# Patient Record
Sex: FEMALE | Race: WHITE | NOT HISPANIC OR LATINO | Employment: FULL TIME | URBAN - METROPOLITAN AREA
[De-identification: names, ages, dates, MRNs, and addresses within clinical notes are randomized per-mention and may not be internally consistent; named-entity substitution may affect disease eponyms.]

---
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
## 2018-01-24 ENCOUNTER — TELEPHONE (OUTPATIENT)
Dept: ORTHOPEDICS | Facility: CLINIC | Age: 18
End: 2018-01-24

## 2018-01-24 NOTE — TELEPHONE ENCOUNTER
----- Message from Nader Urbina MD sent at 1/24/2018  2:44 PM CST -----  Mom had called a few days ago asking to be referred to Dr. Vazquez.  Can you let her know that I emailed him and his office will contact her to set up an appt?  Thanks.

## 2018-02-22 ENCOUNTER — OFFICE VISIT (OUTPATIENT)
Dept: SPORTS MEDICINE | Facility: CLINIC | Age: 18
End: 2018-02-22
Payer: MEDICAID

## 2018-02-22 VITALS
DIASTOLIC BLOOD PRESSURE: 84 MMHG | BODY MASS INDEX: 36.86 KG/M2 | SYSTOLIC BLOOD PRESSURE: 123 MMHG | WEIGHT: 208 LBS | HEART RATE: 88 BPM | HEIGHT: 63 IN

## 2018-02-22 DIAGNOSIS — M17.12 PATELLOFEMORAL ARTHRITIS OF LEFT KNEE: Primary | ICD-10-CM

## 2018-02-22 DIAGNOSIS — M17.12 DEGENERATIVE ARTHRITIS OF LEFT KNEE: ICD-10-CM

## 2018-02-22 PROCEDURE — 99999 PR PBB SHADOW E&M-EST. PATIENT-LVL III: CPT | Mod: PBBFAC,,, | Performed by: ORTHOPAEDIC SURGERY

## 2018-02-22 PROCEDURE — 99203 OFFICE O/P NEW LOW 30 MIN: CPT | Mod: S$PBB,,, | Performed by: ORTHOPAEDIC SURGERY

## 2018-02-22 PROCEDURE — 99213 OFFICE O/P EST LOW 20 MIN: CPT | Mod: PBBFAC,PO | Performed by: ORTHOPAEDIC SURGERY

## 2018-02-22 PROCEDURE — 3008F BODY MASS INDEX DOCD: CPT | Mod: ,,, | Performed by: ORTHOPAEDIC SURGERY

## 2018-02-22 RX ORDER — DICLOFENAC SODIUM 10 MG/G
2 GEL TOPICAL 3 TIMES DAILY PRN
Qty: 1 TUBE | Refills: 1 | Status: SHIPPED | OUTPATIENT
Start: 2018-02-22 | End: 2018-02-27

## 2018-02-22 RX ORDER — DICLOFENAC SODIUM 10 MG/G
2 GEL TOPICAL 3 TIMES DAILY PRN
Qty: 1 TUBE | Refills: 1 | Status: SHIPPED | OUTPATIENT
Start: 2018-02-22 | End: 2018-02-22 | Stop reason: SDUPTHER

## 2018-02-22 NOTE — PROGRESS NOTES
CC: Left knee pain    17 y.o. Female with a history of left knee pain.  She has a complex surgical history:    8/2013 MPFL reconstruction  2/2015 Jasper osteotomy  8/2017 distal femur varus-producing osteotomy.  Knee scope. 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  This was treated with above surgeries.  She reports some instability but mostly anterior knee pain.  Pain is worse with activities and also with prolonged inactivity.  She reports crepitus and clicking.      She states that the pain is severe and not responding to any conservative care.      Is affecting ADLs.      Review of Systems   Constitution: Negative. Negative for chills, fever and night sweats.   HENT: Negative for congestion and headaches.    Eyes: Negative for blurred vision, left vision loss and right vision loss.   Cardiovascular: Negative for chest pain and syncope.   Respiratory: Negative for cough and shortness of breath.    Endocrine: Negative for polydipsia, polyphagia and polyuria.   Hematologic/Lymphatic: Negative for bleeding problem. Does not bruise/bleed easily.   Skin: Negative for dry skin, itching and rash.   Musculoskeletal: Negative for falls. Positive for knee pain  Gastrointestinal: Negative for abdominal pain and bowel incontinence.   Genitourinary: Negative for bladder incontinence and nocturia.   Neurological: Negative for disturbances in coordination, loss of balance and seizures.   Psychiatric/Behavioral: Negative for depression. The patient does not have insomnia.    Allergic/Immunologic: Negative for hives and persistent infections.     PAST MEDICAL HISTORY:   Past Medical History:   Diagnosis Date    Aggressive behavior     post anesthesia    Granuloma of skin     left lower eye lid at age 4    Ovarian cyst 2013    Recurrent dislocation of left patella     Vision abnormalities      PAST SURGICAL HISTORY:   Past Surgical History:   Procedure Laterality Date  "   EYE SURGERY      granuloma removal under left eye    KNEE ARTHROSCOPY      Osteotomy of tibia      PATELLA REALIGNMENT       FAMILY HISTORY:   Family History   Problem Relation Age of Onset    Diabetes Mother     Seizures Father      SOCIAL HISTORY:   Social History     Social History    Marital status: Single     Spouse name: N/A    Number of children: N/A    Years of education: N/A     Occupational History    Not on file.     Social History Main Topics    Smoking status: Never Smoker    Smokeless tobacco: Never Used    Alcohol use No    Drug use: No    Sexual activity: No     Other Topics Concern    Not on file     Social History Narrative    No narrative on file       MEDICATIONS:   Current Outpatient Prescriptions:     albuterol (PROVENTIL) 2.5 mg /3 mL (0.083 %) nebulizer solution, Take 3 mLs (2.5 mg total) by nebulization every 4 (four) hours as needed., Disp: 60 each, Rfl: 3    diazePAM (VALIUM) 2 MG tablet, TAKE 1 TABLET BY MOUTH EVERY 6 HOURS AS NEEDED FOR MUSCLE SPASMS, Disp: 30 tablet, Rfl: 0    escitalopram oxalate (LEXAPRO) 20 MG tablet, Take 1 tablet (20 mg total) by mouth once daily., Disp: 1 tablet, Rfl: 0    lorazepam (ATIVAN) 0.5 MG tablet, Take 1 tablet (0.5 mg total) by mouth 2 (two) times daily., Disp: 3 tablet, Rfl: 0    nebulizer accessories Kit, 3 neb kits per month for 12 months, Disp: 3 kit, Rfl: 12    traZODone (DESYREL) 100 MG tablet, Take 200 mg by mouth every evening., Disp: , Rfl:     ziprasidone (GEODON) 80 MG capsule, Take 80 mg by mouth 2 (two) times daily with meals., Disp: , Rfl:   ALLERGIES: Review of patient's allergies indicates:  No Known Allergies    VITAL SIGNS: /84   Pulse 88   Ht 5' 3" (1.6 m)   Wt 94.3 kg (208 lb)   BMI 36.85 kg/m²      PHYSICAL EXAMINATION  VITAL SIGNS: /84   Pulse 88   Ht 5' 3" (1.6 m)   Wt 94.3 kg (208 lb)   BMI 36.85 kg/m²    General:  The patient is alert and oriented x 3.  Mood is pleasant.  Observation " of ears, eyes and nose reveal no gross abnormalities.  HEENT: NCAT, sclera nonicteric  Lungs: Respirations are equal and unlabored.    Left KNEE EXAMINATION     OBSERVATION / INSPECTION   Gait:   Nonantalgic   Alignment:  Neutral   Scars:   Several - lateral thigh, midline anterior, arthroscopic portals, medial   Muscle atrophy: Mild  Effusion:  None   Warmth:  None   Discoloration:   none     TENDERNESS / CREPITUS (T / C):          T / C      T / C   Patella   + / +   Lateral joint line   - / -    Peripatellar medial  -  Medial joint line    + / -    Peripatellar lateral -  Medial plica   - / -    Patellar tendon -   Popliteal fossa  - / -    Quad tendon   -   Gastrocnemius   -   Prepatellar Bursa - / -   Quadricep   -   Tibial tubercle  -  Thigh/hamstring  -   Pes anserine/HS -  Fibula    -   ITB   - / -  Tibia     -   Tib/fib joint  - / -  LCL    -     MFC   - / -   MCL: Proximal  -    LFC   - / -    Distal   -          ROM: (* = pain)  PASSIVE   ACTIVE    Left :   5 / 0 / 145   5 / 0 / 145     Right :    5 / 0 / 145   5 / 0 / 145    Patellofemoral examination:  See above noted areas of tenderness.   Patella position    Subluxation / dislocation: Centered           Sup. / Inf;   Normal   Crepitus (PF):    Absent   Patellar Mobility:       Medial-lateral:   2 quadrants laterally   Superior-inferior:  Normal    Inferior tilt   Normal    Patellar tendon:  Normal   Lateral tilt:    Normal   J-sign:     None   Patellofemoral grind:   No pain       MENISCAL SIGNS:     Pain on terminal extension:  neg  Pain on terminal flexion:  neg  Joshuas maneuver:  neg for pain  Squat     neg posterior joint pain    LIGAMENT EXAMINATION:  ACL / Lachman:  normal (-1 to 2mm)    PCL-Post.  drawer: normal 0 to 2mm  MCL- Valgus:  normal 0 to 2mm  LCL- Varus:  normal 0 to 2mm  Pivot shift: normal (Equal)   Dial Test: difference c/w other side   At 30° flexion: normal (< 5°)    At 90° flexion: normal (< 5°)   Reverse Pivot Shift:    normal (Equal)     STRENGTH: (* = with pain) PAINFUL SIDE   Quadricep   5/5   Hamstrin/5    EXTREMITY NEURO-VASCULAR EXAMINATION:   Sensation:  Grossly intact to light touch all dermatomal regions.   Motor Function:  Fully intact motor function at hip, knee, foot and ankle    DTRs;  quadriceps and  achilles 2+.  No clonus and downgoing Babinski.    Vascular status:  DP and PT pulses 2+, brisk capillary refill, symmetric.     Other Findings:       X-rays:  Hip to ankle show left distal femoral plate.  Mechanical alignment is restored.    Tibial tubercle screws in good position  No significaint arthritic changes     ASSESSMENT:      17 year old female with complex left knee surgical history.  Patellofemoral arthritis and pain    PLAN:     1.  PT in [place]  3.  Follow-up in 3 months.  Will consider Euflexxa next visit    All questions were answered, pt will contact us for questions or concerns in the interim.

## 2018-02-22 NOTE — LETTER
February 22, 2018      Nader Urbina MD  [address]  [facility] [address]           [facility]  [address]  [facility] [address]  Phone: [phone]          Patient: Sheila Urbina   MR Number: [id number]   YOB: 2000   Date of Visit: 2/22/2018       Dear Dr. Nader Urbina:    Thank you for referring Sheila Urbina to me for evaluation. Attached you will find relevant portions of my assessment and plan of care.    If you have questions, please do not hesitate to call me. I look forward to following Sheila Urbina along with you.    Sincerely,    Ji Vazquez MD    Enclosure  CC:  No Recipients    If you would like to receive this communication electronically, please contact [email or web address] or [phone] to request more information on [employer] access.    For providers and/or their staff who would like to refer a patient to [facility], please contact us through our one-stop-shop provider referral line, [facility], at [phone].    If you feel you have received this communication in error or would no longer like to receive these types of communications, please e-mail [email or web address]

## 2018-02-27 DIAGNOSIS — M25.562 LEFT KNEE PAIN: Primary | ICD-10-CM

## 2018-02-27 RX ORDER — DICLOFENAC SODIUM 75 MG/1
75 TABLET, DELAYED RELEASE ORAL 2 TIMES DAILY
Qty: 60 TABLET | Refills: 2 | Status: SHIPPED | OUTPATIENT
Start: 2018-02-27 | End: 2019-07-19 | Stop reason: ALTCHOICE

## 2018-02-27 NOTE — TELEPHONE ENCOUNTER
----- Message from Nia Salcido sent at 2/26/2018  4:38 PM CST -----  Contact: mother       ----- Message -----  From: Nora Cabrales  Sent: 2/26/2018   2:27 PM  To: George DUNCAN Staff    Pt's mother called stating the pt's insurance will not cover the rx for diclofenac sodium (VOLTAREN) 1 % Gel and they are requesting a new medication to be prescribed to the pt. [phone]

## 2018-02-27 NOTE — TELEPHONE ENCOUNTER
Returning call to patient' mother, Edelmira.  Dr. Vazquez will send Voltaren tablets 75mg BID to her pharmacy to take instead.

## 2018-04-06 ENCOUNTER — OFFICE VISIT (OUTPATIENT)
Dept: SPORTS MEDICINE | Facility: CLINIC | Age: 18
End: 2018-04-06
Payer: MEDICAID

## 2018-04-06 ENCOUNTER — HOSPITAL ENCOUNTER (OUTPATIENT)
Dept: RADIOLOGY | Facility: HOSPITAL | Age: 18
Discharge: HOME | End: 2018-04-06
Attending: PHYSICIAN ASSISTANT
Payer: MEDICAID

## 2018-04-06 VITALS
SYSTOLIC BLOOD PRESSURE: 145 MMHG | HEIGHT: 63 IN | BODY MASS INDEX: 36.86 KG/M2 | WEIGHT: 208 LBS | HEART RATE: 89 BPM | DIASTOLIC BLOOD PRESSURE: 82 MMHG

## 2018-04-06 DIAGNOSIS — M25.562 LEFT KNEE PAIN, UNSPECIFIED CHRONICITY: Primary | ICD-10-CM

## 2018-04-06 DIAGNOSIS — M25.562 LEFT KNEE PAIN, UNSPECIFIED CHRONICITY: ICD-10-CM

## 2018-04-06 PROCEDURE — 99213 OFFICE O/P EST LOW 20 MIN: CPT | Mod: PBBFAC,25,PO | Performed by: PHYSICIAN ASSISTANT

## 2018-04-06 PROCEDURE — 99999 PR PBB SHADOW E&M-EST. PATIENT-LVL III: CPT | Mod: PBBFAC,,, | Performed by: PHYSICIAN ASSISTANT

## 2018-04-06 PROCEDURE — 73564 X-RAY EXAM KNEE 4 OR MORE: CPT | Mod: TC,50,FY,PO

## 2018-04-06 PROCEDURE — 73564 X-RAY EXAM KNEE 4 OR MORE: CPT | Mod: 26,50,, | Performed by: RADIOLOGY

## 2018-04-06 PROCEDURE — 99214 OFFICE O/P EST MOD 30 MIN: CPT | Mod: S$PBB,,, | Performed by: PHYSICIAN ASSISTANT

## 2018-04-06 NOTE — PROGRESS NOTES
CC: Left knee pain    17 y.o. Female with a history of left knee pain.  On 4/1/18 patient was doing a handstand in the pool when she fell to the side and your medial left knee hit the edge of the pool. Pain was not significant at first but got worse overnight. She took diclofenac with mild relief.  Pain has gotten progressively worse. She has mild swelling over medial surgical scar. She is concerned about the screw in that area from prior surgery.    She has a complex surgical history:    8/2013 MPFL reconstruction  2/2015 Jasper osteotomy  8/2017 distal femur varus-producing osteotomy.  Knee scope. 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  This was treated with above surgeries.  She reports some instability but mostly anterior knee pain.  Pain is worse with activities and also with prolonged inactivity.  She reports crepitus and clicking.      She states that the pain is severe and not responding to any conservative care.      Is affecting ADLs.      Review of Systems   Constitution: Negative. Negative for chills, fever and night sweats.   HENT: Negative for congestion and headaches.    Eyes: Negative for blurred vision, left vision loss and right vision loss.   Cardiovascular: Negative for chest pain and syncope.   Respiratory: Negative for cough and shortness of breath.    Endocrine: Negative for polydipsia, polyphagia and polyuria.   Hematologic/Lymphatic: Negative for bleeding problem. Does not bruise/bleed easily.   Skin: Negative for dry skin, itching and rash.   Musculoskeletal: Negative for falls. Positive for knee pain  Gastrointestinal: Negative for abdominal pain and bowel incontinence.   Genitourinary: Negative for bladder incontinence and nocturia.   Neurological: Negative for disturbances in coordination, loss of balance and seizures.   Psychiatric/Behavioral: Negative for depression. The patient does not have insomnia.     Allergic/Immunologic: Negative for hives and persistent infections.     PAST MEDICAL HISTORY:   Past Medical History:   Diagnosis Date    Aggressive behavior     post anesthesia    Granuloma of skin     left lower eye lid at age 4    Ovarian cyst 2013    Recurrent dislocation of left patella     Vision abnormalities      PAST SURGICAL HISTORY:   Past Surgical History:   Procedure Laterality Date    EYE SURGERY      granuloma removal under left eye    KNEE ARTHROSCOPY      Osteotomy of tibia      PATELLA REALIGNMENT       FAMILY HISTORY:   Family History   Problem Relation Age of Onset    Diabetes Mother     Seizures Father      SOCIAL HISTORY:   Social History     Social History    Marital status: Single     Spouse name: N/A    Number of children: N/A    Years of education: N/A     Occupational History    Not on file.     Social History Main Topics    Smoking status: Never Smoker    Smokeless tobacco: Never Used    Alcohol use No    Drug use: No    Sexual activity: No     Other Topics Concern    Not on file     Social History Narrative    No narrative on file       MEDICATIONS:   Current Outpatient Prescriptions:     albuterol (PROVENTIL) 2.5 mg /3 mL (0.083 %) nebulizer solution, Take 3 mLs (2.5 mg total) by nebulization every 4 (four) hours as needed., Disp: 60 each, Rfl: 3    diazePAM (VALIUM) 2 MG tablet, TAKE 1 TABLET BY MOUTH EVERY 6 HOURS AS NEEDED FOR MUSCLE SPASMS, Disp: 30 tablet, Rfl: 0    diclofenac (VOLTAREN) 75 MG EC tablet, Take 1 tablet (75 mg total) by mouth 2 (two) times daily., Disp: 60 tablet, Rfl: 2    escitalopram oxalate (LEXAPRO) 20 MG tablet, Take 1 tablet (20 mg total) by mouth once daily., Disp: 1 tablet, Rfl: 0    lorazepam (ATIVAN) 0.5 MG tablet, Take 1 tablet (0.5 mg total) by mouth 2 (two) times daily., Disp: 3 tablet, Rfl: 0    nebulizer accessories Kit, 3 neb kits per month for 12 months, Disp: 3 kit, Rfl: 12    traZODone (DESYREL) 100 MG tablet, Take  "200 mg by mouth every evening., Disp: , Rfl:     ziprasidone (GEODON) 80 MG capsule, Take 80 mg by mouth 2 (two) times daily with meals., Disp: , Rfl:   ALLERGIES: Review of patient's allergies indicates:  No Known Allergies    VITAL SIGNS: BP (!) 145/82   Pulse 89   Ht 5' 3" (1.6 m)   Wt 94.3 kg (208 lb)   BMI 36.85 kg/m²      PHYSICAL EXAMINATION  VITAL SIGNS: BP (!) 145/82   Pulse 89   Ht 5' 3" (1.6 m)   Wt 94.3 kg (208 lb)   BMI 36.85 kg/m²    General:  The patient is alert and oriented x 3.  Mood is pleasant.  Observation of ears, eyes and nose reveal no gross abnormalities.  HEENT: NCAT, sclera nonicteric  Lungs: Respirations are equal and unlabored.    Left KNEE EXAMINATION     OBSERVATION / INSPECTION   Gait:   antalgic   Alignment:  Neutral   Scars:   Several - lateral thigh, midline anterior, arthroscopic portals, medial   Muscle atrophy: Mild  Effusion:  None   Warmth:  None   Discoloration:   Quarter size ecchymosis to left medial knee     TENDERNESS / CREPITUS (T / C):          T / C      T / C   Patella   + / +   Lateral joint line   - / -    Peripatellar medial  -  Medial joint line    + / -    Peripatellar lateral -  Medial plica   - / -    Patellar tendon -   Popliteal fossa  - / -    Quad tendon   -   Gastrocnemius   -   Prepatellar Bursa - / -   Quadricep   -   Tibial tubercle  -  Thigh/hamstring  -   Pes anserine/HS -  Fibula    -   ITB   - / -  Tibia     -   Tib/fib joint  - / -  LCL    -     MFC   - / -   MCL: Proximal  -    LFC   - / -    Distal   -          ROM: (* = pain)  PASSIVE   ACTIVE    Left :   5 / 0 / 145   5 / 0 / 145     Right :    5 / 0 / 145   5 / 0 / 145    Patellofemoral examination:  See above noted areas of tenderness.   Patella position    Subluxation / dislocation: Centered           Sup. / Inf;   Normal   Crepitus (PF):    Absent   Patellar Mobility:       Medial-lateral:   2 quadrants laterally   Superior-inferior:  Normal    Inferior tilt   Normal    Patellar " tendon:  Normal   Lateral tilt:    Normal   J-sign:     None   Patellofemoral grind:   No pain       MENISCAL SIGNS:     Pain on terminal extension:  neg  Pain on terminal flexion:  neg  Joshuas maneuver:  neg for pain  Squat     neg posterior joint pain    LIGAMENT EXAMINATION:  ACL / Lachman:  normal (-1 to 2mm)    PCL-Post.  drawer: normal 0 to 2mm  MCL- Valgus:  normal 0 to 2mm  LCL- Varus:  normal 0 to 2mm  Pivot shift: normal (Equal)   Dial Test: difference c/w other side   At 30° flexion: normal (< 5°)    At 90° flexion: normal (< 5°)   Reverse Pivot Shift:   normal (Equal)     STRENGTH: (* = with pain) PAINFUL SIDE   Quadricep   5/5   Hamstrin/5    EXTREMITY NEURO-VASCULAR EXAMINATION:   Sensation:  Grossly intact to light touch all dermatomal regions.   Motor Function:  Fully intact motor function at hip, knee, foot and ankle    DTRs;  quadriceps and  achilles 2+.  No clonus and downgoing Babinski.    Vascular status:  DP and PT pulses 2+, brisk capillary refill, symmetric.     Other Findings:       X-rays:  Hip to ankle show left distal femoral plate.  Mechanical alignment is restored.    Tibial tubercle screws in good position  No significaint arthritic changes     TODAYS VISIT XRAYS 18    FINDINGS:  As observed on the earlier study the patient is status post surgical procedure involving the tibial tubercle, the patella and the distal femoral diametaphysis described in detail in the report for the study dated 2017.    Hardware appears intact and aligned.  I detect no complication of the procedure.  Surgical sites reveal healing although there is osteopenia of the left lower extremity compared to the right, possibly due to disuse.  Joint spaces are preserved.  There is slight medial subluxation of the left femur with respect to the tibial plateau on AP standing view.  Otherwise joint spaces and cortical margins appear preserved.    Right suprapatellar joint effusion observed on  08/30/2017 has resolved.    ASSESSMENT:      17 year old female with complex left knee surgical history.    Patellofemoral arthritis and pain.   Mild hematoma left medial knee from recent pool injury.    PLAN:     1.  PT in [place]  2.  Polar care unit for patient, contacted [facility]- rep  3. RTC in 2 weeks for follow up, consider Euflexxa next visit    All questions were answered, pt will contact us for questions or concerns in the interim.

## 2018-07-12 ENCOUNTER — OFFICE VISIT (OUTPATIENT)
Dept: SPORTS MEDICINE | Facility: CLINIC | Age: 18
End: 2018-07-12
Payer: MEDICAID

## 2018-07-12 VITALS
SYSTOLIC BLOOD PRESSURE: 124 MMHG | DIASTOLIC BLOOD PRESSURE: 80 MMHG | HEART RATE: 78 BPM | HEIGHT: 63 IN | BODY MASS INDEX: 36.86 KG/M2 | WEIGHT: 208 LBS

## 2018-07-12 DIAGNOSIS — M17.12 PRIMARY OSTEOARTHRITIS OF LEFT KNEE: Primary | ICD-10-CM

## 2018-07-12 PROCEDURE — 99213 OFFICE O/P EST LOW 20 MIN: CPT | Mod: PBBFAC,PO | Performed by: ORTHOPAEDIC SURGERY

## 2018-07-12 PROCEDURE — 99999 PR PBB SHADOW E&M-EST. PATIENT-LVL III: CPT | Mod: PBBFAC,,, | Performed by: ORTHOPAEDIC SURGERY

## 2018-07-12 PROCEDURE — 99214 OFFICE O/P EST MOD 30 MIN: CPT | Mod: S$PBB,,, | Performed by: ORTHOPAEDIC SURGERY

## 2018-07-12 NOTE — PROGRESS NOTES
"CC: Left knee pain    17 y.o. Female with a history of left knee pain and complex surgical history as listed below:     8/2013 MPFL reconstruction  2/2015 Jasper osteotomy  8/2017 distal femur varus-producing osteotomy.  Knee scope. 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 Urbina. Upon evaluation of patient recommended physical therapy. She is now s/p 4 weeks of PT with no significant improvement. Reports pain without significant instability symptoms. Has attempted corticosteroids x1 with limited improvement.      VITAL SIGNS: /80   Pulse 78   Ht 5' 3" (1.6 m)   Wt 94.3 kg (208 lb)   BMI 36.85 kg/m²      PHYSICAL EXAMINATION  VITAL SIGNS: /80   Pulse 78   Ht 5' 3" (1.6 m)   Wt 94.3 kg (208 lb)   BMI 36.85 kg/m²    General:  The patient is alert and oriented x 3.  Mood is pleasant.  Observation of ears, eyes and nose reveal no gross abnormalities.  HEENT: NCAT, sclera nonicteric  Lungs: Respirations are equal and unlabored.    Left KNEE EXAMINATION     OBSERVATION / INSPECTION   Gait:   antalgic   Alignment:  Neutral   Scars:   Several - lateral thigh, midline anterior, arthroscopic portals, medial   Muscle atrophy: Mild  Effusion:  None   Warmth:  None   Discoloration:   Quarter size ecchymosis to left medial knee     TENDERNESS / CREPITUS (T / C):          T / C      T / C   Patella   + / +   Lateral joint line   - / -    Peripatellar medial  -  Medial joint line    + / -    Peripatellar lateral -  Medial plica   - / -    Patellar tendon -   Popliteal fossa  - / -    Quad tendon   -   Gastrocnemius   -   Prepatellar Bursa - / -   Quadricep   -   Tibial tubercle  -  Thigh/hamstring  -   Pes anserine/HS -  Fibula    -   ITB   - / -  Tibia     -   Tib/fib joint  - / -  LCL    -     MFC   - / -   MCL: Proximal  -    LFC   - / -    Distal   -          ROM: (* = pain)  PASSIVE   ACTIVE    Left :   5 / 0 / 145   5 / 0 / 145     Right :    5 / 0 / 145   5 / 0 / " 145    Patellofemoral examination:  See above noted areas of tenderness.   Patella position    Subluxation / dislocation: Centered           Sup. / Inf;   Normal   Crepitus (PF):    Absent   Patellar Mobility:       Medial-lateral:   2 quadrants laterally   Superior-inferior:  Normal    Inferior tilt   Normal    Patellar tendon:  Normal   Lateral tilt:    Normal   J-sign:     None   Patellofemoral grind:   No pain       MENISCAL SIGNS:     Pain on terminal extension:  neg  Pain on terminal flexion:  neg  Joshuas maneuver:  neg for pain  Squat     neg posterior joint pain    LIGAMENT EXAMINATION:  ACL / Lachman:  normal (-1 to 2mm)    PCL-Post.  drawer: normal 0 to 2mm  MCL- Valgus:  normal 0 to 2mm  LCL- Varus:  normal 0 to 2mm  Pivot shift: normal (Equal)   Dial Test: difference c/w other side   At 30° flexion: normal (< 5°)    At 90° flexion: normal (< 5°)   Reverse Pivot Shift:   normal (Equal)     STRENGTH: (* = with pain) PAINFUL SIDE   Quadricep   5/5   Hamstrin/5    EXTREMITY NEURO-VASCULAR EXAMINATION:   Sensation:  Grossly intact to light touch all dermatomal regions.   Motor Function:  Fully intact motor function at hip, knee, foot and ankle    DTRs;  quadriceps and  achilles 2+.  No clonus and downgoing Babinski.    Vascular status:  DP and PT pulses 2+, brisk capillary refill, symmetric.       ASSESSMENT:      17 year old female with complex left knee surgical history.    Patellofemoral arthritis and pain.     PLAN:     1. Continue PT in [place]  2. Will begin process for Euflexxa injections, RTC following approval to begin series    All questions were answered, pt will contact us for questions or concerns in the interim.

## 2018-07-26 ENCOUNTER — TELEPHONE (OUTPATIENT)
Dept: SPORTS MEDICINE | Facility: CLINIC | Age: 18
End: 2018-07-26

## 2018-07-26 NOTE — TELEPHONE ENCOUNTER
----- Message from Lilli Valera sent at 7/26/2018 12:56 PM CDT -----  Contact: Mother- Edelmira Hickey called requesting more information about the euflexxa injection patient will be receiving 8/3. Wants to know what to expect after the injection-  if Trisha will be able to walk after injection and if a knee brace will be  needed etc.    Edelmira can be reached at [phone]

## 2018-08-03 ENCOUNTER — OFFICE VISIT (OUTPATIENT)
Dept: SPORTS MEDICINE | Facility: CLINIC | Age: 18
End: 2018-08-03
Payer: MEDICAID

## 2018-08-03 VITALS
HEART RATE: 92 BPM | SYSTOLIC BLOOD PRESSURE: 136 MMHG | HEIGHT: 66 IN | BODY MASS INDEX: 33.75 KG/M2 | DIASTOLIC BLOOD PRESSURE: 94 MMHG | WEIGHT: 210 LBS

## 2018-08-03 DIAGNOSIS — M17.12 PRIMARY OSTEOARTHRITIS OF LEFT KNEE: Primary | ICD-10-CM

## 2018-08-03 PROCEDURE — 20610 DRAIN/INJ JOINT/BURSA W/O US: CPT | Mod: PBBFAC,PO | Performed by: PHYSICIAN ASSISTANT

## 2018-08-03 PROCEDURE — 99499 UNLISTED E&M SERVICE: CPT | Mod: S$PBB,,, | Performed by: PHYSICIAN ASSISTANT

## 2018-08-03 PROCEDURE — 20610 DRAIN/INJ JOINT/BURSA W/O US: CPT | Mod: S$PBB,LT,, | Performed by: PHYSICIAN ASSISTANT

## 2018-08-03 PROCEDURE — 99999 PR PBB SHADOW E&M-EST. PATIENT-LVL III: CPT | Mod: PBBFAC,,, | Performed by: PHYSICIAN ASSISTANT

## 2018-08-03 PROCEDURE — 99213 OFFICE O/P EST LOW 20 MIN: CPT | Mod: PBBFAC,PO | Performed by: PHYSICIAN ASSISTANT

## 2018-08-03 RX ADMIN — Medication 20 MG: at 10:08

## 2018-08-03 NOTE — PROGRESS NOTES
Patient is here for follow up of left knee arthritis. Patient is requesting Euflexxa injection #1.  [facility] Has failed other conservative modalities including NSAIDS, activity modification, weight loss.    The prior shot was tolerated well.    PHYSICAL EXAMINATION:     General: The patient is alert and oriented x 3. Mood is pleasant.   Observation of ears, eyes and nose reveals no gross abnormalities. No   labored breathing observed.     No signs of infection or adverse reaction to knee.       Injection Procedure  A time out was performed, including verification of patient ID, procedure, site and side, availability of information and equipment, review of safety issues, and agreement with consent, the procedure site was marked.    After time out was performed, the patient was prepped aseptically with povidone-iodine swabsticks. A diagnostic and therapeutic injection of 2cc Euflexxa was given under sterile technique using a 22.5 gauge needle from the Anterolateral aspect of the left Knee Joint in the supine position. Lot No: [id number]. Exp date: 2019-08-14.     Sheila Urbina had no adverse reactions to the medication. Pain decreased. She was instructed to apply ice to the joint for 20 minutes and avoid strenuous activities for 24-36 hours following the injection. She was warned of possible blood sugar and/or blood pressure changes during that time. Following that time, she can resume regular activities.    She was reminded to call the clinic immediately for any adverse side effects as explained in clinic today.

## 2018-08-03 NOTE — LETTER
August 3, 2018      Christopher Lewis MD  [address]  [facility] [address]           [facility]  [address]  [facility] [address]  Phone: [phone]          Patient: Sheila Urbina   MR Number: [id number]   YOB: 2000   Date of Visit: 8/3/2018       Dear Dr. Christopher Lewis:    Thank you for referring Sheila Urbina to me for evaluation. Attached you will find relevant portions of my assessment and plan of care.    If you have questions, please do not hesitate to call me. I look forward to following Sheila Urbina along with you.    Sincerely,    Shaina Vivas PA-C    Enclosure  CC:  No Recipients    If you would like to receive this communication electronically, please contact [email or web address] or [phone] to request more information on [employer] Link access.    For providers and/or their staff who would like to refer a patient to [facility], please contact us through our one-stop-shop provider referral line, [facility] , at [phone].    If you feel you have received this communication in error or would no longer like to receive these types of communications, please e-mail [email or web address]

## 2018-08-10 ENCOUNTER — OFFICE VISIT (OUTPATIENT)
Dept: SPORTS MEDICINE | Facility: CLINIC | Age: 18
End: 2018-08-10
Payer: MEDICAID

## 2018-08-10 VITALS
SYSTOLIC BLOOD PRESSURE: 118 MMHG | HEART RATE: 86 BPM | WEIGHT: 210 LBS | BODY MASS INDEX: 33.75 KG/M2 | DIASTOLIC BLOOD PRESSURE: 81 MMHG | HEIGHT: 66 IN

## 2018-08-10 DIAGNOSIS — M17.12 PRIMARY OSTEOARTHRITIS OF LEFT KNEE: Primary | ICD-10-CM

## 2018-08-10 PROCEDURE — 99213 OFFICE O/P EST LOW 20 MIN: CPT | Mod: PBBFAC,PO,25 | Performed by: PHYSICIAN ASSISTANT

## 2018-08-10 PROCEDURE — 99999 PR PBB SHADOW E&M-EST. PATIENT-LVL III: CPT | Mod: PBBFAC,,, | Performed by: PHYSICIAN ASSISTANT

## 2018-08-10 PROCEDURE — 20610 DRAIN/INJ JOINT/BURSA W/O US: CPT | Mod: S$PBB,LT,, | Performed by: PHYSICIAN ASSISTANT

## 2018-08-10 PROCEDURE — 20610 DRAIN/INJ JOINT/BURSA W/O US: CPT | Mod: PBBFAC,PO | Performed by: PHYSICIAN ASSISTANT

## 2018-08-10 PROCEDURE — 99499 UNLISTED E&M SERVICE: CPT | Mod: S$PBB,,, | Performed by: PHYSICIAN ASSISTANT

## 2018-08-10 RX ADMIN — Medication 20 MG: at 09:08

## 2018-08-10 NOTE — PROGRESS NOTES
Patient is here for follow up of left knee arthritis. Patient is requesting Euflexxa injection #2.  [facility] Has failed other conservative modalities including NSAIDS, activity modification, weight loss.    The prior shot was tolerated well.    PHYSICAL EXAMINATION:     General: The patient is alert and oriented x 3. Mood is pleasant.   Observation of ears, eyes and nose reveals no gross abnormalities. No   labored breathing observed.     No signs of infection or adverse reaction to knee.       Injection Procedure  A time out was performed, including verification of patient ID, procedure, site and side, availability of information and equipment, review of safety issues, and agreement with consent, the procedure site was marked.    After time out was performed, the patient was prepped aseptically with povidone-iodine swabsticks. A diagnostic and therapeutic injection of 2cc Euflexxa was given under sterile technique using a 22.5 gauge needle from the Anterolateral aspect of the left Knee Joint in the supine position. Lot No: [id number]. Exp date: 2019-08-21.     Sheila Urbina had no adverse reactions to the medication. Pain decreased. She was instructed to apply ice to the joint for 20 minutes and avoid strenuous activities for 24-36 hours following the injection. She was warned of possible blood sugar and/or blood pressure changes during that time. Following that time, she can resume regular activities.    She was reminded to call the clinic immediately for any adverse side effects as explained in clinic today.

## 2018-08-10 NOTE — LETTER
August 10, 2018      Christopher Lewis MD  [address]  [facility] [address]           [facility]  [address]  [facility] [address]  Phone: [phone]          Patient: Sheila Urbina   MR Number: [id number]   YOB: 2000   Date of Visit: 8/10/2018       Dear Dr. Christopher Lewis:    Thank you for referring Sheila Urbina to me for evaluation. Attached you will find relevant portions of my assessment and plan of care.    If you have questions, please do not hesitate to call me. I look forward to following Sheila Urbina along with you.    Sincerely,    Shaina Vivas PA-C    Enclosure  CC:  No Recipients    If you would like to receive this communication electronically, please contact [email or web address] or [phone] to request more information on [employer] Link access.    For providers and/or their staff who would like to refer a patient to [facility], please contact us through our one-stop-shop provider referral line, [facility] , at [phone].    If you feel you have received this communication in error or would no longer like to receive these types of communications, please e-mail [email or web address]

## 2018-08-17 ENCOUNTER — OFFICE VISIT (OUTPATIENT)
Dept: SPORTS MEDICINE | Facility: CLINIC | Age: 18
End: 2018-08-17
Payer: MEDICAID

## 2018-08-17 VITALS
HEIGHT: 65 IN | WEIGHT: 210 LBS | DIASTOLIC BLOOD PRESSURE: 79 MMHG | SYSTOLIC BLOOD PRESSURE: 119 MMHG | TEMPERATURE: 86 F | BODY MASS INDEX: 34.99 KG/M2

## 2018-08-17 DIAGNOSIS — M17.12 PRIMARY OSTEOARTHRITIS OF LEFT KNEE: Primary | ICD-10-CM

## 2018-08-17 PROCEDURE — 99499 UNLISTED E&M SERVICE: CPT | Mod: S$PBB,,, | Performed by: PHYSICIAN ASSISTANT

## 2018-08-17 PROCEDURE — 20610 DRAIN/INJ JOINT/BURSA W/O US: CPT | Mod: PBBFAC,PO | Performed by: PHYSICIAN ASSISTANT

## 2018-08-17 PROCEDURE — 99213 OFFICE O/P EST LOW 20 MIN: CPT | Mod: PBBFAC,PO | Performed by: PHYSICIAN ASSISTANT

## 2018-08-17 PROCEDURE — 20610 DRAIN/INJ JOINT/BURSA W/O US: CPT | Mod: S$PBB,LT,, | Performed by: PHYSICIAN ASSISTANT

## 2018-08-17 PROCEDURE — 99999 PR PBB SHADOW E&M-EST. PATIENT-LVL III: CPT | Mod: PBBFAC,,, | Performed by: PHYSICIAN ASSISTANT

## 2018-08-17 RX ADMIN — Medication 20 MG: at 09:08

## 2018-08-17 NOTE — PROGRESS NOTES
Patient is here for follow up of left knee arthritis. Patient is requesting Euflexxa injection #3.  [facility] Has failed other conservative modalities including NSAIDS, activity modification, weight loss.    The prior shot was tolerated well.    PHYSICAL EXAMINATION:     General: The patient is alert and oriented x 3. Mood is pleasant.   Observation of ears, eyes and nose reveals no gross abnormalities. No   labored breathing observed.     No signs of infection or adverse reaction to knee.       Injection Procedure  A time out was performed, including verification of patient ID, procedure, site and side, availability of information and equipment, review of safety issues, and agreement with consent, the procedure site was marked.    After time out was performed, the patient was prepped aseptically with povidone-iodine swabsticks. A diagnostic and therapeutic injection of 2cc Euflexxa was given under sterile technique using a 22.5 gauge needle from the Anterolateral aspect of the left Knee Joint in the supine position. Lot No: [id number]. Exp date: 2019-08-21.     Sheila Urbina had no adverse reactions to the medication. Pain decreased. She was instructed to apply ice to the joint for 20 minutes and avoid strenuous activities for 24-36 hours following the injection. She was warned of possible blood sugar and/or blood pressure changes during that time. Following that time, she can resume regular activities.    She was reminded to call the clinic immediately for any adverse side effects as explained in clinic today.

## 2018-08-17 NOTE — LETTER
August 17, 2018      Christopher Lewis MD  [address]  [facility] [address]           [facility]  [address]  [facility] [address]  Phone: [phone]          Patient: Sheila Urbina   MR Number: [id number]   YOB: 2000   Date of Visit: 8/17/2018       Dear Dr. Christopher Lewis:    Thank you for referring Sheila Urbina to me for evaluation. Attached you will find relevant portions of my assessment and plan of care.    If you have questions, please do not hesitate to call me. I look forward to following Sheila Urbina along with you.    Sincerely,    Shaina Vivas PA-C    Enclosure  CC:  No Recipients    If you would like to receive this communication electronically, please contact [email or web address] or [phone] to request more information on [employer] Link access.    For providers and/or their staff who would like to refer a patient to [facility], please contact us through our one-stop-shop provider referral line, [facility] , at [phone].    If you feel you have received this communication in error or would no longer like to receive these types of communications, please e-mail [email or web address]

## 2018-09-07 ENCOUNTER — TELEPHONE (OUTPATIENT)
Dept: SPORTS MEDICINE | Facility: CLINIC | Age: 18
End: 2018-09-07

## 2018-09-07 NOTE — TELEPHONE ENCOUNTER
----- Message from Terri Paulson sent at 9/7/2018  9:55 AM CDT -----  Contact: Self   Pt is calling to get the form for a handicap license filled out was wondering if she can receive a call back @ [phone]

## 2018-09-10 PROBLEM — S83.8X2A MENISCAL INJURY, LEFT, INITIAL ENCOUNTER: Status: ACTIVE | Noted: 2018-09-10

## 2018-09-10 PROBLEM — S86.912A KNEE STRAIN, LEFT, INITIAL ENCOUNTER: Status: ACTIVE | Noted: 2018-09-10

## 2018-09-21 ENCOUNTER — OFFICE VISIT (OUTPATIENT)
Dept: SPORTS MEDICINE | Facility: CLINIC | Age: 18
End: 2018-09-21
Payer: MEDICAID

## 2018-09-21 DIAGNOSIS — M25.362 PATELLAR INSTABILITY OF LEFT KNEE: Primary | ICD-10-CM

## 2018-09-21 PROCEDURE — 99999 PR PBB SHADOW E&M-EST. PATIENT-LVL II: CPT | Mod: PBBFAC,,, | Performed by: ORTHOPAEDIC SURGERY

## 2018-09-21 PROCEDURE — 99214 OFFICE O/P EST MOD 30 MIN: CPT | Mod: S$PBB,,, | Performed by: ORTHOPAEDIC SURGERY

## 2018-09-21 PROCEDURE — 99212 OFFICE O/P EST SF 10 MIN: CPT | Mod: PBBFAC,PO | Performed by: ORTHOPAEDIC SURGERY

## 2018-09-21 NOTE — PROGRESS NOTES
CC: Left knee pain    18 y.o. 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  Knee scope. 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 Urbina. Upon evaluation of patient recommended physical therapy. She is now s/p 4 weeks of PT with no significant improvement. Reports pain without significant instability symptoms. Has attempted corticosteroids x1 with limited improvement.    Patient was dancing 1.5 weeks ago when her left leg slid out from under her causing concern for recurrent patellar dislocation. She had not had a patella dislocation since her most recent previous surgery in 2017. Her swelling initially improved but she continues to have pain around her knee cap. She is seeing Dr. Helm today instead of Dr. Vazquez today because of schedule availability. She recently had Euflexxa injections with Shaina Vivas PA-C, with significant relief of her patellofemoral grinding.       PAST MEDICAL HISTORY:   Past Medical History:   Diagnosis Date    Aggressive behavior     post anesthesia    Granuloma of skin     left lower eye lid at age 4    Ovarian cyst 2013    Recurrent dislocation of left patella     Vision abnormalities      PAST SURGICAL HISTORY:   Past Surgical History:   Procedure Laterality Date    ARTHROSCOPY, KNEE Left 8/1/2013    [facility]LR    ARTHROSCOPY-KNEE Left 12/29/2016    [facility]    ARTHROSCOPY-KNEE Left 3/30/2015    [facility]    ARTHROSCOPY-KNEE W/ CHONDROPLASTY  8/3/2017    [facility]    ARTHROSCOPY-KNEE W/ CHONDROPLASTY Left 12/29/2016    [facility]     ARTHROSCOPY-KNEE, lateral release Left 8/3/2017    [facility]    EYE SURGERY      granuloma removal under left eye    KNEE ARTHROSCOPY      Osteotomy of tibia      OSTEOTOMY-FEMUR distal arthrex Left 8/3/2017    [facility]LR    OSTEOTOMY-TIBIA (Tibial tubercle transfer) Left 3/30/2015    [facility] at [facility]LR    PATELLA REALIGNMENT      RECONSTRUCTION, LIGAMENT, MEDIAL PATELLOFEMORAL, RIGHT Left 8/1/2013    [facility]LR    RELEASE-LATERAL  8/3/2017    [facility]    REMOVAL-HARDWARE-LEG Left 12/29/2016    [facility]LR    REPAIR - MEDIAL PATELLA FEMORAL LIGAMENT Left 3/30/2015    Performed by [facility] at [facility] OR 1ST FLR     FAMILY HISTORY:   Family History   Problem Relation Age of Onset    Diabetes Mother     Seizures Father      SOCIAL HISTORY:   Social History     Socioeconomic History    Marital status: Single     Spouse name: Not on file    Number of children: Not on file    Years of education: Not on file    Highest education level: Not on file   Social Needs    Financial resource strain: Not on file    Food insecurity - worry: Not on file    Food insecurity - inability: Not on file    Transportation needs - medical: Not on file    Transportation needs - non-medical: Not on file   Occupational History    Not on file   Tobacco Use    Smoking status: Never Smoker    Smokeless tobacco: Never Used   Substance and Sexual Activity    Alcohol use: No    Drug use: No    Sexual activity: No   Other Topics Concern    Not on file   Social History Narrative    Not on file       MEDICATIONS:   Current Outpatient Medications:     albuterol (PROVENTIL) 2.5 mg /3 mL (0.083 %) nebulizer solution, Take 3 mLs (2.5 mg total) by nebulization every 4 (four) hours as  needed., Disp: 60 each, Rfl: 3    diazePAM (VALIUM) 2 MG tablet, TAKE 1 TABLET BY MOUTH EVERY 6 HOURS AS NEEDED FOR MUSCLE SPASMS, Disp: 30 tablet, Rfl: 0    diclofenac (VOLTAREN) 75 MG EC tablet, Take 1 tablet (75 mg total) by mouth 2 (two) times daily., Disp: 60 tablet, Rfl: 2    diclofenac (VOLTAREN) 75 MG EC tablet, Take 1 tablet (75 mg total) by mouth 2 (two) times daily as needed (Pain)., Disp: 30 tablet, Rfl: 0    escitalopram oxalate (LEXAPRO) 20 MG tablet, Take 1 tablet (20 mg total) by mouth once daily., Disp: 1 tablet, Rfl: 0    HYDROcodone-acetaminophen (NORCO) 7.5-325 mg per tablet, Take 1 tablet by mouth every 6 (six) hours as needed for Pain., Disp: 12 tablet, Rfl: 0    JUNEL FE 1/20, 28, 1 mg-20 mcg (21)/75 mg (7) per tablet, TK 1 T PO QD ATC, Disp: , Rfl: 3    LATUDA 40 mg Tab tablet, , Disp: , Rfl: 1    lorazepam (ATIVAN) 0.5 MG tablet, Take 1 tablet (0.5 mg total) by mouth 2 (two) times daily., Disp: 3 tablet, Rfl: 0    nebulizer accessories Kit, 3 neb kits per month for 12 months, Disp: 3 kit, Rfl: 12    traZODone (DESYREL) 100 MG tablet, Take 200 mg by mouth every evening., Disp: , Rfl:     ziprasidone (GEODON) 80 MG capsule, Take 80 mg by mouth 2 (two) times daily with meals., Disp: , Rfl:   ALLERGIES: Review of patient's allergies indicates:  No Known Allergies    VITAL SIGNS: [facility] 08/22/2018      Review of Systems   Constitution: Negative. Negative for chills, fever and night sweats.   HENT: Negative for congestion and headaches.    Eyes: Negative for blurred vision, left vision loss and right vision loss.   Cardiovascular: Negative for chest pain and syncope.   Respiratory: Negative for cough and shortness of breath.    Endocrine: Negative for polydipsia, polyphagia and polyuria.   Hematologic/Lymphatic: Negative for bleeding problem. Does not bruise/bleed easily.   Skin: Negative for dry skin, itching and rash.   Musculoskeletal: Negative for falls and muscle weakness.    Gastrointestinal: Negative for abdominal pain and bowel incontinence.   Genitourinary: Negative for bladder incontinence and nocturia.   Neurological: Negative for disturbances in coordination, loss of balance and seizures.   Psychiatric/Behavioral: Negative for depression. The patient does not have insomnia.    Allergic/Immunologic: Negative for hives and persistent infections.       PHYSICAL EXAMINATION  VITAL SIGNS: LMP 08/22/2018    General:  The patient is alert and oriented x 3.  Mood is pleasant.  Observation of ears, eyes and nose reveal no gross abnormalities.  HEENT: NCAT, sclera nonicteric  Lungs: Respirations are equal and unlabored.    Left KNEE EXAMINATION     OBSERVATION / INSPECTION   Gait:   antalgic   Alignment:  Neutral   Scars:   Several - lateral thigh, midline anterior, arthroscopic portals, medial   Muscle atrophy: Mild  Effusion:  None   Warmth:  None   Discoloration:   Quarter size ecchymosis to left medial knee     TENDERNESS / CREPITUS (T / C):          T / C      T / C   Patella   + / +   Lateral joint line   - / -    Peripatellar medial  +  Medial joint line    + / -    Peripatellar lateral ++  Medial plica   - / -    Patellar tendon -   Popliteal fossa  - / -    Quad tendon   -   Gastrocnemius   -   Prepatellar Bursa - / -   Quadricep   -   Tibial tubercle  -  Thigh/hamstring  -   Pes anserine/HS -  Fibula    -   ITB   - / -  Tibia     -   Tib/fib joint  - / -  LCL    -     MFC   - / -   MCL: Proximal  -    LFC   - / -    Distal   -          ROM: (* = pain)  PASSIVE   ACTIVE    Left :   5 / 0 / 145   5 / 0 / 145     Right :    5 / 0 / 145   5 / 0 / 145    Patellofemoral examination:  See above noted areas of tenderness.   Patella position    Subluxation / dislocation: Centered           Sup. / Inf;   Normal   Crepitus (PF):    Absent   Patellar Mobility:       Medial-lateral:   2 quadrants laterally   Superior-inferior:  Normal    Inferior tilt   Normal    Patellar tendon:  Normal    Lateral tilt:    Normal   J-sign:     None   Patellofemoral grind:   No pain       MENISCAL SIGNS:     Pain on terminal extension:  neg  Pain on terminal flexion:  neg  Joshuas maneuver:  neg for pain  Squat     neg posterior joint pain    LIGAMENT EXAMINATION:  ACL / Lachman:  normal (-1 to 2mm)    PCL-Post.  drawer: normal 0 to 2mm  MCL- Valgus:  normal 0 to 2mm  LCL- Varus:  normal 0 to 2mm  Pivot shift: normal (Equal)   Dial Test: difference c/w other side   At 30° flexion: normal (< 5°)    At 90° flexion: normal (< 5°)   Reverse Pivot Shift:   normal (Equal)     STRENGTH: (* = with pain) PAINFUL SIDE   Quadricep   5/5   Hamstrin/5    EXTREMITY NEURO-VASCULAR EXAMINATION:   Sensation:  Grossly intact to light touch all dermatomal regions.   Motor Function:  Fully intact motor function at hip, knee, foot and ankle    DTRs;  quadriceps and  achilles 2+.  No clonus and downgoing Babinski.    Vascular status:  DP and PT pulses 2+, brisk capillary refill, symmetric.       ASSESSMENT:    18 year old female with complex left knee surgical history for recurrent patellar instability.    Patellofemoral arthritis and pain.     PLAN:     1. MRI Left knee to eval for evidence of patellar dislocation  2. Discussed quad strengthening and weight loss to improve knee pain  3. Recommend patient follow up with Dr. Vazquez for ongoing care    All questions were answered, pt will contact us for questions or concerns in the interim.

## 2018-09-27 ENCOUNTER — TELEPHONE (OUTPATIENT)
Dept: SPORTS MEDICINE | Facility: CLINIC | Age: 18
End: 2018-09-27

## 2018-09-27 NOTE — TELEPHONE ENCOUNTER
----- Message from Jorgito Castillo sent at 9/27/2018  4:01 PM CDT -----  Contact: self   Pt is requesting a call back regarding appt on 10/01. Pt can be reached at [phone].

## 2018-09-28 ENCOUNTER — TELEPHONE (OUTPATIENT)
Dept: SPORTS MEDICINE | Facility: OTHER | Age: 18
End: 2018-09-28

## 2018-09-28 ENCOUNTER — HOSPITAL ENCOUNTER (OUTPATIENT)
Dept: RADIOLOGY | Facility: HOSPITAL | Age: 18
Discharge: HOME | End: 2018-09-28
Attending: ORTHOPAEDIC SURGERY
Payer: MEDICAID

## 2018-09-28 DIAGNOSIS — M25.362 PATELLAR INSTABILITY OF LEFT KNEE: ICD-10-CM

## 2018-09-28 PROCEDURE — 73721 MRI JNT OF LWR EXTRE W/O DYE: CPT | Mod: 26,LT,, | Performed by: RADIOLOGY

## 2018-09-28 PROCEDURE — 73721 MRI JNT OF LWR EXTRE W/O DYE: CPT | Mod: TC,LT

## 2018-09-28 NOTE — TELEPHONE ENCOUNTER
Called the patient to discuss knee mri results which demonstrated:     Bone marrow edema in the inferomedial patella and superomedial trochlea with edema of Hoffa's fat pad may reflect patellar maltracking and possibly osseous contusion.    Assessment:   No evidence of recurrent dislocation but has patella femoral bony edema    Plan:   Patient has follow up with Dr. Vazquez's clinic scheduled for 10/1. We defer to his team for further management.

## 2018-10-01 ENCOUNTER — OFFICE VISIT (OUTPATIENT)
Dept: SPORTS MEDICINE | Facility: CLINIC | Age: 18
End: 2018-10-01
Payer: MEDICAID

## 2018-10-01 VITALS
HEART RATE: 101 BPM | WEIGHT: 212 LBS | HEIGHT: 65 IN | SYSTOLIC BLOOD PRESSURE: 138 MMHG | BODY MASS INDEX: 35.32 KG/M2 | DIASTOLIC BLOOD PRESSURE: 88 MMHG

## 2018-10-01 DIAGNOSIS — M17.12 PATELLOFEMORAL ARTHRITIS OF LEFT KNEE: Primary | ICD-10-CM

## 2018-10-01 DIAGNOSIS — M25.362 PATELLAR INSTABILITY OF LEFT KNEE: ICD-10-CM

## 2018-10-01 PROCEDURE — 99213 OFFICE O/P EST LOW 20 MIN: CPT | Mod: PBBFAC,PO | Performed by: PHYSICIAN ASSISTANT

## 2018-10-01 PROCEDURE — 99999 PR PBB SHADOW E&M-EST. PATIENT-LVL III: CPT | Mod: PBBFAC,,, | Performed by: PHYSICIAN ASSISTANT

## 2018-10-01 PROCEDURE — 99214 OFFICE O/P EST MOD 30 MIN: CPT | Mod: S$PBB,,, | Performed by: PHYSICIAN ASSISTANT

## 2018-10-01 NOTE — PROGRESS NOTES
CC: Left knee pain    18 y.o. 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  Knee scope. 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 MRI without evidence of patellar dislocation. She had not had a patella dislocation since her most recent previous surgery in 2017. She reports great improvement since the incident dancing.    Care was transitioned from Dr. Urbina. No significant improvement with PT. Reports pain is most significant just lateral to patella. She recently had Euflexxa injections with me with significant relief of her patellofemoral grinding.       PAST MEDICAL HISTORY:   Past Medical History:   Diagnosis Date    Aggressive behavior     post anesthesia    Granuloma of skin     left lower eye lid at age 4    Ovarian cyst 2013    Recurrent dislocation of left patella     Vision abnormalities      PAST SURGICAL HISTORY:   Past Surgical History:   Procedure Laterality Date    ARTHROSCOPY, KNEE Left 8/1/2013    [facility]    ARTHROSCOPY-KNEE Left 12/29/2016    [facility]    ARTHROSCOPY-KNEE Left 3/30/2015    [facility]    ARTHROSCOPY-KNEE W/ CHONDROPLASTY  8/3/2017    [facility]    ARTHROSCOPY-KNEE W/ CHONDROPLASTY Left 12/29/2016    [facility]    ARTHROSCOPY-KNEE, lateral release Left 8/3/2017    [facility]    EYE SURGERY      granuloma removal under left eye    KNEE ARTHROSCOPY      Osteotomy of tibia      OSTEOTOMY-FEMUR distal arthrex Left  8/3/2017    [facility]ST [facility]LR    OSTEOTOMY-TIBIA (Tibial tubercle transfer) Left 3/30/2015    [facility] 1ST FLR    PATELLA REALIGNMENT      RECONSTRUCTION, LIGAMENT, MEDIAL PATELLOFEMORAL, RIGHT Left 8/1/2013    [facility]ST [facility]LR    RELEASE-LATERAL  8/3/2017    [facility]LR    REMOVAL-HARDWARE-LEG Left 12/29/2016    [facility] OR 1ST [facility]LR    REPAIR - MEDIAL PATELLA FEMORAL LIGAMENT Left 3/30/2015    [facility] at [facility] OR 1ST FLR     FAMILY HISTORY:   Family History   Problem Relation Age of Onset    Diabetes Mother     Seizures Father      SOCIAL HISTORY:   Social History     Socioeconomic History    Marital status: Single     Spouse name: Not on file    Number of children: Not on file    Years of education: Not on file    Highest education level: Not on file   Social Needs    Financial resource strain: Not on file    Food insecurity - worry: Not on file    Food insecurity - inability: Not on file    Transportation needs - medical: Not on file    Transportation needs - non-medical: Not on file   Occupational History    Not on file   Tobacco Use    Smoking status: Never Smoker    Smokeless tobacco: Never Used   Substance and Sexual Activity    Alcohol use: No    Drug use: No    Sexual activity: No   Other Topics Concern    Not on file   Social History Narrative    Not on file       MEDICATIONS:   Current Outpatient Medications:     albuterol (PROVENTIL) 2.5 mg /3 mL (0.083 %) nebulizer solution, Take 3 mLs (2.5 mg total) by nebulization every 4 (four) hours as needed., Disp: 60 each, Rfl: 3    diazePAM (VALIUM) 2 MG tablet, TAKE 1 TABLET BY MOUTH EVERY 6 HOURS AS NEEDED FOR MUSCLE SPASMS, Disp: 30 tablet, Rfl: 0    diclofenac (VOLTAREN) 75 MG EC tablet, Take 1 tablet (75 mg total) by mouth 2 (two)  times daily., Disp: 60 tablet, Rfl: 2    diclofenac (VOLTAREN) 75 MG EC tablet, Take 1 tablet (75 mg total) by mouth 2 (two) times daily as needed (Pain)., Disp: 30 tablet, Rfl: 0    escitalopram oxalate (LEXAPRO) 20 MG tablet, Take 1 tablet (20 mg total) by mouth once daily., Disp: 1 tablet, Rfl: 0    HYDROcodone-acetaminophen (NORCO) 7.5-325 mg per tablet, Take 1 tablet by mouth every 6 (six) hours as needed for Pain., Disp: 12 tablet, Rfl: 0    JUNEL FE 1/20, 28, 1 mg-20 mcg (21)/75 mg (7) per tablet, TK 1 T PO QD ATC, Disp: , Rfl: 3    LATUDA 40 mg Tab tablet, , Disp: , Rfl: 1    lorazepam (ATIVAN) 0.5 MG tablet, Take 1 tablet (0.5 mg total) by mouth 2 (two) times daily., Disp: 3 tablet, Rfl: 0    nebulizer accessories Kit, 3 neb kits per month for 12 months, Disp: 3 kit, Rfl: 12    traZODone (DESYREL) 100 MG tablet, Take 200 mg by mouth every evening., Disp: , Rfl:     ziprasidone (GEODON) 80 MG capsule, Take 80 mg by mouth 2 (two) times daily with meals., Disp: , Rfl:   ALLERGIES: Review of patient's allergies indicates:  No Known Allergies    VITAL SIGNS: There were no vitals taken for this visit.     Review of Systems   Constitution: Negative. Negative for chills, fever and night sweats.   HENT: Negative for congestion and headaches.    Eyes: Negative for blurred vision, left vision loss and right vision loss.   Cardiovascular: Negative for chest pain and syncope.   Respiratory: Negative for cough and shortness of breath.    Endocrine: Negative for polydipsia, polyphagia and polyuria.   Hematologic/Lymphatic: Negative for bleeding problem. Does not bruise/bleed easily.   Skin: Negative for dry skin, itching and rash.   Musculoskeletal: Negative for falls and muscle weakness.   Gastrointestinal: Negative for abdominal pain and bowel incontinence.   Genitourinary: Negative for bladder incontinence and nocturia.   Neurological: Negative for disturbances in coordination, loss of balance and seizures.    Psychiatric/Behavioral: Negative for depression. The patient does not have insomnia.    Allergic/Immunologic: Negative for hives and persistent infections.       PHYSICAL EXAMINATION  VITAL SIGNS: There were no vitals taken for this visit.   General:  The patient is alert and oriented x 3.  Mood is pleasant.  Observation of ears, eyes and nose reveal no gross abnormalities.  HEENT: NCAT, sclera nonicteric  Lungs: Respirations are equal and unlabored.    Left KNEE EXAMINATION     OBSERVATION / INSPECTION   Gait:   antalgic   Alignment:  Neutral   Scars:   Several - lateral thigh, midline anterior, arthroscopic portals, medial   Muscle atrophy: Mild  Effusion:  None   Warmth:  None   Discoloration:   none    TENDERNESS / CREPITUS (T / C):          T / C      T / C   Patella   + / +   Lateral joint line   - / -    Peripatellar medial  +  Medial joint line    + / -    Peripatellar lateral ++  Medial plica   - / -    Patellar tendon -   Popliteal fossa   - / -    Quad tendon   -   Gastrocnemius   -   Prepatellar Bursa - / -   Quadricep   -   Tibial tubercle  -  Thigh/hamstring  -   Pes anserine/HS -  Fibula    -   ITB   - / -  Tibia     -   Tib/fib joint  - / -  LCL    -     MFC   - / -   MCL: Proximal  -    LFC   - / -    Distal   -          ROM: (* = pain)  PASSIVE   ACTIVE    Left :   0 / 0 / 145   0 / 0 / 145     Right :    0 / 0 / 145   0 / 0 / 145    Patellofemoral examination:  See above noted areas of tenderness.   Patella position    Subluxation / dislocation: Centered           Sup. / Inf;   Normal   Crepitus (PF):    Absent   Patellar Mobility:       Medial-lateral:   2 quadrants laterally   Superior-inferior:  Normal    Inferior tilt   Normal    Patellar tendon:  Normal   Lateral tilt:    Normal   J-sign:     None   Patellofemoral grind:   No pain       MENISCAL SIGNS:     Pain on terminal extension:  neg  Pain on terminal flexion:  neg  Joshuas maneuver:  neg for pain  Squat     neg posterior joint  pain    LIGAMENT EXAMINATION:  ACL / Lachman:  normal (-1 to 2mm)    PCL-Post.  drawer: normal 0 to 2mm  MCL- Valgus:  normal 0 to 2mm  LCL- Varus:  normal 0 to 2mm  Pivot shift: normal (Equal)   Dial Test: difference c/w other side   At 30° flexion: normal (< 5°)    At 90° flexion: normal (< 5°)   Reverse Pivot Shift:   normal (Equal)     STRENGTH: (* = with pain) PAINFUL SIDE   Quadricep   5/5   Hamstrin/5    EXTREMITY NEURO-VASCULAR EXAMINATION:   Sensation:  Grossly intact to light touch all dermatomal regions.   Motor Function:  Fully intact motor function at hip, knee, foot and ankle    DTRs;  quadriceps and  achilles 2+.  No clonus and downgoing Babinski.    Vascular status:  DP and PT pulses 2+, brisk capillary refill, symmetric.     MRI left knee 18    FINDINGS:  Menisci:  There is no tear of the medial or lateral meniscus.    Ligaments:  ACL, PCL, MCL, and LCL complex are intact.    Tendons:  Quadriceps and patellar tendon are intact.  Status post MPFL reconstruction.  No evidence for retear.    Cartilage:    Patellofemoral: Partial-thickness fissuring noted over the medial patellar facet.    Medial tibiofemoral: Articular cartilage is maintained.    Lateral tibiofemoral: Articular cartilage is maintained.    Bone: Bone marrow edema noted at the inferior medial aspect of the patella and the superomedial aspect of the trochlea.    Miscellaneous: Edema noted in Hoffa's fat pad.  Small effusion noted.    Impression:  1. Bone marrow edema in the inferomedial patella and superomedial trochlea with edema of Hoffa's fat pad may reflect patellar maltracking and possibly osseous contusion.    ASSESSMENT:    18 year old female with complex left knee surgical history.      Patellofemoral arthritis and pain. No evidence of recurrent dislocation but has patella femoral bony edema.    PLAN:     1. J sleeve applied by Errol Grande  2. Discussed quad strengthening and weight loss to improve knee pain  3.  Patient to start water aerobics at the [facility] in [place].  4. RTC for follow up with Dr. Vazquez in 3 months    All questions were answered, pt will contact us for questions or concerns in the interim.

## 2019-01-10 ENCOUNTER — OFFICE VISIT (OUTPATIENT)
Dept: SPORTS MEDICINE | Facility: CLINIC | Age: 19
End: 2019-01-10
Payer: MEDICAID

## 2019-01-10 VITALS
DIASTOLIC BLOOD PRESSURE: 84 MMHG | WEIGHT: 212 LBS | BODY MASS INDEX: 35.32 KG/M2 | SYSTOLIC BLOOD PRESSURE: 127 MMHG | HEART RATE: 86 BPM | HEIGHT: 65 IN

## 2019-01-10 DIAGNOSIS — M17.12 DEGENERATIVE ARTHRITIS OF LEFT KNEE: Primary | ICD-10-CM

## 2019-01-10 DIAGNOSIS — M79.644 PAIN OF RIGHT THUMB: ICD-10-CM

## 2019-01-10 PROCEDURE — 99214 PR OFFICE/OUTPT VISIT, EST, LEVL IV, 30-39 MIN: ICD-10-PCS | Mod: S$PBB,,, | Performed by: ORTHOPAEDIC SURGERY

## 2019-01-10 PROCEDURE — 99999 PR PBB SHADOW E&M-EST. PATIENT-LVL III: ICD-10-PCS | Mod: PBBFAC,,, | Performed by: ORTHOPAEDIC SURGERY

## 2019-01-10 PROCEDURE — 99999 PR PBB SHADOW E&M-EST. PATIENT-LVL III: CPT | Mod: PBBFAC,,, | Performed by: ORTHOPAEDIC SURGERY

## 2019-01-10 PROCEDURE — 99214 OFFICE O/P EST MOD 30 MIN: CPT | Mod: S$PBB,,, | Performed by: ORTHOPAEDIC SURGERY

## 2019-01-10 PROCEDURE — 99213 OFFICE O/P EST LOW 20 MIN: CPT | Mod: PBBFAC,PO | Performed by: ORTHOPAEDIC SURGERY

## 2019-01-10 NOTE — PROGRESS NOTES
CC: Left knee pain    18 y.o. 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  Knee scope. 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 MRI without evidence of patellar dislocation. She had not had a patella dislocation since her most recent previous surgery in 2017. She reports great improvement since the incident dancing.    Care was transitioned from Dr. Urbina. No significant improvement with PT. Reports pain is most significant just lateral to patella. She had Euflexxa injections with Shaina Vivas PA-C in August 2018 with significant relief of her patellofemoral grinding.       PAST MEDICAL HISTORY:   Past Medical History:   Diagnosis Date    Aggressive behavior     post anesthesia    Granuloma of skin     left lower eye lid at age 4    Ovarian cyst 2013    Recurrent dislocation of left patella     Vision abnormalities      PAST SURGICAL HISTORY:   Past Surgical History:   Procedure Laterality Date    ARTHROSCOPY, KNEE Left 8/1/2013    [facility]    ARTHROSCOPY-KNEE Left 12/29/2016    [facility]    ARTHROSCOPY-KNEE Left 3/30/2015    [facility]    ARTHROSCOPY-KNEE W/ CHONDROPLASTY  8/3/2017    [facility]    ARTHROSCOPY-KNEE W/ CHONDROPLASTY Left 12/29/2016    [facility]    ARTHROSCOPY-KNEE, lateral release Left 8/3/2017    [facility]    EYE SURGERY      granuloma removal under left eye    KNEE ARTHROSCOPY      Osteotomy of tibia       OSTEOTOMY-FEMUR distal arthrex Left 8/3/2017    [facility]    OSTEOTOMY-TIBIA (Tibial tubercle transfer) Left 3/30/2015    [facility]    PATELLA REALIGNMENT      RECONSTRUCTION, LIGAMENT, MEDIAL PATELLOFEMORAL, RIGHT Left 8/1/2013    [facility]    RELEASE-LATERAL  8/3/2017    [facility]    REMOVAL-HARDWARE-LEG Left 12/29/2016    [facility]    REPAIR - MEDIAL PATELLA FEMORAL LIGAMENT Left 3/30/2015    [facility]     FAMILY HISTORY:   Family History   Problem Relation Age of Onset    Diabetes Mother     Seizures Father      SOCIAL HISTORY:   Social History     Socioeconomic History    Marital status: Single     Spouse name: Not on file    Number of children: Not on file    Years of education: Not on file    Highest education level: Not on file   Social Needs    Financial resource strain: Not on file    Food insecurity - worry: Not on file    Food insecurity - inability: Not on file    Transportation needs - medical: Not on file    Transportation needs - non-medical: Not on file   Occupational History    Not on file   Tobacco Use    Smoking status: Never Smoker    Smokeless tobacco: Never Used   Substance and Sexual Activity    Alcohol use: No    Drug use: No    Sexual activity: No   Other Topics Concern    Not on file   Social History Narrative    Not on file       MEDICATIONS:   Current Outpatient Medications:     albuterol (PROVENTIL) 2.5 mg /3 mL (0.083 %) nebulizer solution, Take 3 mLs (2.5 mg total) by nebulization every 4 (four) hours as needed., Disp: 60 each, Rfl: 3    butalbital-acetaminophen-caffeine -40 mg (FIORICET, ESGIC) -40 mg per tablet, Take 1 tablet by mouth every 4 (four) hours as needed for Pain., Disp: 15 tablet, Rfl:  "0    diazePAM (VALIUM) 2 MG tablet, TAKE 1 TABLET BY MOUTH EVERY 6 HOURS AS NEEDED FOR MUSCLE SPASMS, Disp: 30 tablet, Rfl: 0    diclofenac (VOLTAREN) 75 MG EC tablet, Take 1 tablet (75 mg total) by mouth 2 (two) times daily., Disp: 60 tablet, Rfl: 2    diclofenac (VOLTAREN) 75 MG EC tablet, Take 1 tablet (75 mg total) by mouth 2 (two) times daily as needed (Pain)., Disp: 30 tablet, Rfl: 0    escitalopram oxalate (LEXAPRO) 20 MG tablet, Take 1 tablet (20 mg total) by mouth once daily., Disp: 1 tablet, Rfl: 0    HYDROcodone-acetaminophen (NORCO) 7.5-325 mg per tablet, Take 1 tablet by mouth every 6 (six) hours as needed for Pain., Disp: 12 tablet, Rfl: 0    JUNEL FE 1/20, 28, 1 mg-20 mcg (21)/75 mg (7) per tablet, TK 1 T PO QD ATC, Disp: , Rfl: 3    LATUDA 40 mg Tab tablet, , Disp: , Rfl: 1    lorazepam (ATIVAN) 0.5 MG tablet, Take 1 tablet (0.5 mg total) by mouth 2 (two) times daily., Disp: 3 tablet, Rfl: 0    nebulizer accessories Kit, 3 neb kits per month for 12 months, Disp: 3 kit, Rfl: 12    traZODone (DESYREL) 100 MG tablet, Take 200 mg by mouth every evening., Disp: , Rfl:     ziprasidone (GEODON) 80 MG capsule, Take 80 mg by mouth 2 (two) times daily with meals., Disp: , Rfl:   ALLERGIES: Review of patient's allergies indicates:  No Known Allergies    VITAL SIGNS: /84   Pulse 86   Ht 5' 5" (1.651 m)   Wt 96.2 kg (212 lb)   BMI 35.28 kg/m²      Review of Systems   Constitution: Negative. Negative for chills, fever and night sweats.   HENT: Negative for congestion and headaches.    Eyes: Negative for blurred vision, left vision loss and right vision loss.   Cardiovascular: Negative for chest pain and syncope.   Respiratory: Negative for cough and shortness of breath.    Endocrine: Negative for polydipsia, polyphagia and polyuria.   Hematologic/Lymphatic: Negative for bleeding problem. Does not bruise/bleed easily.   Skin: Negative for dry skin, itching and rash.   Musculoskeletal: Negative " "for falls and muscle weakness.   Gastrointestinal: Negative for abdominal pain and bowel incontinence.   Genitourinary: Negative for bladder incontinence and nocturia.   Neurological: Negative for disturbances in coordination, loss of balance and seizures.   Psychiatric/Behavioral: Negative for depression. The patient does not have insomnia.    Allergic/Immunologic: Negative for hives and persistent infections.       PHYSICAL EXAMINATION  VITAL SIGNS: /84   Pulse 86   Ht 5' 5" (1.651 m)   Wt 96.2 kg (212 lb)   BMI 35.28 kg/m²    General:  The patient is alert and oriented x 3.  Mood is pleasant.  Observation of ears, eyes and nose reveal no gross abnormalities.  HEENT: NCAT, sclera nonicteric  Lungs: Respirations are equal and unlabored.    Left KNEE EXAMINATION     OBSERVATION / INSPECTION   Gait:   antalgic   Alignment:  Neutral   Scars:   Several - lateral thigh, midline anterior, arthroscopic portals, medial   Muscle atrophy: Mild  Effusion:  None   Warmth:  None   Discoloration:   none    TENDERNESS / CREPITUS (T / C):          T / C      T / C   Patella   + / +   Lateral joint line   - / -    Peripatellar medial  +  Medial joint line    + / -    Peripatellar lateral ++  Medial plica   - / -    Patellar tendon -   Popliteal fossa   - / -    Quad tendon   -   Gastrocnemius   -   Prepatellar Bursa - / -   Quadricep   -   Tibial tubercle  -  Thigh/hamstring  -   Pes anserine/HS -  Fibula    -   ITB   - / -  Tibia     -   Tib/fib joint  - / -  LCL    -     MFC   - / -   MCL: Proximal  -    LFC   - / -    Distal   -          ROM: (* = pain)  PASSIVE   ACTIVE    Left :   0 / 0 / 145   0 / 0 / 145     Right :    0 / 0 / 145   0 / 0 / 145    Patellofemoral examination:  See above noted areas of tenderness.   Patella position    Subluxation / dislocation: Centered           Sup. / Inf;   Normal   Crepitus (PF):    Absent   Patellar Mobility:       Medial-lateral:   2 quadrants " laterally   Superior-inferior:  Normal    Inferior tilt   Normal    Patellar tendon:  Normal   Lateral tilt:    Normal   J-sign:     None   Patellofemoral grind:   No pain       MENISCAL SIGNS:     Pain on terminal extension:  neg  Pain on terminal flexion:  neg  Joshuas maneuver:  neg for pain  Squat     neg posterior joint pain    LIGAMENT EXAMINATION:  ACL / Lachman:  normal (-1 to 2mm)    PCL-Post.  drawer: normal 0 to 2mm  MCL- Valgus:  normal 0 to 2mm  LCL- Varus:  normal 0 to 2mm  Pivot shift: normal (Equal)   Dial Test: difference c/w other side   At 30° flexion: normal (< 5°)    At 90° flexion: normal (< 5°)   Reverse Pivot Shift:   normal (Equal)     STRENGTH: (* = with pain) PAINFUL SIDE   Quadricep   5/5   Hamstrin/5    EXTREMITY NEURO-VASCULAR EXAMINATION:   Sensation:  Grossly intact to light touch all dermatomal regions.   Motor Function:  Fully intact motor function at hip, knee, foot and ankle    DTRs;  quadriceps and  achilles 2+.  No clonus and downgoing Babinski.    Vascular status:  DP and PT pulses 2+, brisk capillary refill, symmetric.     MRI left knee (18): Bone marrow edema in the inferomedial patella and superomedial trochlea with edema of Hoffa's fat pad may reflect patellar maltracking and possibly osseous contusion.    ASSESSMENT:    18 year old female with complex left knee surgical history.      Patellofemoral arthritis and pain. No evidence of recurrent dislocation but has patella femoral bony edema.    Right thumb sprain    PLAN:   1. Euflexxa series - will call.  Still doing well.  Plan for May 2019.  2. Continue J-sleeve  3. Right thumb spica splint  4. Will call in a couple weeks if no improvement for referral to [facility].    All questions were answered, pt will contact us for questions or concerns in the interim.

## 2019-02-13 ENCOUNTER — TELEPHONE (OUTPATIENT)
Dept: SPORTS MEDICINE | Facility: CLINIC | Age: 19
End: 2019-02-13

## 2019-02-13 NOTE — TELEPHONE ENCOUNTER
Spoke with patient's mom about upcoming appointment, discussed the possibility of her daughter being seen at the [facility] at [facility]

## 2019-02-14 ENCOUNTER — TELEPHONE (OUTPATIENT)
Dept: SPORTS MEDICINE | Facility: CLINIC | Age: 19
End: 2019-02-14

## 2019-02-14 NOTE — TELEPHONE ENCOUNTER
Spoke with patient's mom to let her know we made her daughter an apt on 3/6 with Dr. Rosas Daley at 8:30 am in [place]. Ms. Hickey said they will try to make the apt. If they can't, they will call to r/s.

## 2019-03-06 ENCOUNTER — OFFICE VISIT (OUTPATIENT)
Dept: ORTHOPEDICS | Facility: CLINIC | Age: 19
End: 2019-03-06
Payer: MEDICAID

## 2019-03-06 VITALS — DIASTOLIC BLOOD PRESSURE: 86 MMHG | SYSTOLIC BLOOD PRESSURE: 118 MMHG

## 2019-03-06 DIAGNOSIS — M65.4 DE QUERVAIN'S TENOSYNOVITIS, RIGHT: Primary | ICD-10-CM

## 2019-03-06 DIAGNOSIS — M25.531 RIGHT WRIST PAIN: Primary | ICD-10-CM

## 2019-03-06 PROCEDURE — 99213 OFFICE O/P EST LOW 20 MIN: CPT | Mod: S$PBB,,, | Performed by: ORTHOPAEDIC SURGERY

## 2019-03-06 PROCEDURE — 99213 OFFICE O/P EST LOW 20 MIN: CPT | Mod: PBBFAC,PN | Performed by: ORTHOPAEDIC SURGERY

## 2019-03-06 PROCEDURE — 99213 PR OFFICE/OUTPT VISIT, EST, LEVL III, 20-29 MIN: ICD-10-PCS | Mod: S$PBB,,, | Performed by: ORTHOPAEDIC SURGERY

## 2019-03-06 PROCEDURE — 99999 PR PBB SHADOW E&M-EST. PATIENT-LVL III: CPT | Mod: PBBFAC,,, | Performed by: ORTHOPAEDIC SURGERY

## 2019-03-06 PROCEDURE — 99999 PR PBB SHADOW E&M-EST. PATIENT-LVL III: ICD-10-PCS | Mod: PBBFAC,,, | Performed by: ORTHOPAEDIC SURGERY

## 2019-03-06 RX ORDER — LITHIUM CARBONATE 300 MG/1
CAPSULE ORAL
Refills: 2 | COMMUNITY
Start: 2019-02-06 | End: 2019-03-06

## 2019-03-06 RX ORDER — NORGESTIMATE AND ETHINYL ESTRADIOL 0.25-0.035
KIT ORAL
Refills: 3 | COMMUNITY
Start: 2019-02-06 | End: 2021-03-08

## 2019-03-06 RX ORDER — KETOCONAZOLE 20 MG/ML
SHAMPOO, SUSPENSION TOPICAL
COMMUNITY
End: 2019-03-06

## 2019-03-06 NOTE — LETTER
March 6, 2019      Ji Vazquez MD  [address]  [facility] [address]           [facility]  [address]  [place] [address]  [facility] [address]  Phone: [phone]  Fax: [phone]          Patient: Sheila Urbina   MR Number: [id number]   YOB: 2000   Date of Visit: 3/6/2019       Dear Dr. Ji Vazquez:    Thank you for referring Sheila Urbina to me for evaluation. Attached you will find relevant portions of my assessment and plan of care.    If you have questions, please do not hesitate to call me. I look forward to following Sheila Urbina along with you.    Sincerely,    Rosas Daley MD    Enclosure  CC:  No Recipients    If you would like to receive this communication electronically, please contact [email or web address] or [phone] to request more information on [employer] Link access.    For providers and/or their staff who would like to refer a patient to [facility], please contact us through our one-stop-shop provider referral line, [facility], at [phone].    If you feel you have received this communication in error or would no longer like to receive these types of communications, please e-mail [email or web address]

## 2019-03-06 NOTE — PROGRESS NOTES
[facility]  History & Physical  Orthopedics    SUBJECTIVE:      Chief Complaint: Right wrist pain    Referring Provider: Ji Vazquez MD     History of Present Illness:  Patient is a 18 y.o. right hand dominant female who presents today with complaints of right radial wrist pain.  She reports a few month history of right radial wrist pain that began atraumatically.  She was given a thumb spica brace by Dr. Vazquez which does help some, but she has found this is too restrictive and makes writing impossible during her classes.  She presents today for evaluation.     The patient is a/an student studying early childhood development.    Onset of symptoms/DOI was 3 mos ago.    Symptoms are aggravated by activity and movement.    Symptoms are alleviated by rest.    Symptoms consist of pain.    The patient rates their pain as a 1/10.    Attempted treatment(s) and/or interventions include rest, activity modification, immobilization and splinting/casting.     The patient denies any fevers, chills, N/V, D/C and presents for evaluation.       Past Medical History:   Diagnosis Date    Aggressive behavior     post anesthesia    Granuloma of skin     left lower eye lid at age 4    Ovarian cyst 2013    Recurrent dislocation of left patella     Vision abnormalities      Past Surgical History:   Procedure Laterality Date    ARTHROSCOPY, KNEE Left 8/1/2013    [facility]    ARTHROSCOPY-KNEE Left 12/29/2016    [facility]    ARTHROSCOPY-KNEE Left 3/30/2015    [facility]    ARTHROSCOPY-KNEE W/ CHONDROPLASTY  8/3/2017    [facility]    ARTHROSCOPY-KNEE W/ CHONDROPLASTY Left 12/29/2016    [facility]    ARTHROSCOPY-KNEE, lateral release Left 8/3/2017    [facility]  1ST FLR    EYE SURGERY      granuloma removal under left eye    KNEE ARTHROSCOPY      Osteotomy of tibia      OSTEOTOMY-FEMUR distal arthrex Left 8/3/2017    [facility]    OSTEOTOMY-TIBIA (Tibial tubercle transfer) Left 3/30/2015    [facility] 1ST FLR    PATELLA REALIGNMENT      RECONSTRUCTION, LIGAMENT, MEDIAL PATELLOFEMORAL, RIGHT Left 8/1/2013    [facility]ST FLR    RELEASE-LATERAL  8/3/2017    [facility]ST [facility]LR    REMOVAL-HARDWARE-LEG Left 12/29/2016    [facility] 1ST [facility]LR    REPAIR - MEDIAL PATELLA FEMORAL LIGAMENT Left 3/30/2015    [facility] 1ST FLR     Review of patient's allergies indicates:  No Known Allergies  Social History     Social History Narrative    Not on file     Family History   Problem Relation Age of Onset    Diabetes Mother     Seizures Father          Current Outpatient Medications:     albuterol (PROVENTIL) 2.5 mg /3 mL (0.083 %) nebulizer solution, Take 3 mLs (2.5 mg total) by nebulization every 4 (four) hours as needed., Disp: 60 each, Rfl: 3    diazePAM (VALIUM) 2 MG tablet, TAKE 1 TABLET BY MOUTH EVERY 6 HOURS AS NEEDED FOR MUSCLE SPASMS, Disp: 30 tablet, Rfl: 0    diclofenac (VOLTAREN) 75 MG EC tablet, Take 1 tablet (75 mg total) by mouth 2 (two) times daily., Disp: 60 tablet, Rfl: 2    ESTARYLLA 0.25-35 mg-mcg per tablet, TK 1 T PO QD ATC, Disp: , Rfl: 3    LATUDA 40 mg Tab tablet, 40 mg once daily. , Disp: , Rfl: 1    lorazepam (ATIVAN) 0.5 MG tablet, Take 1 tablet (0.5 mg total) by mouth 2 (two) times daily., Disp: 3 tablet, Rfl: 0    nebulizer accessories Kit, 3 neb kits per month for 12 months, Disp: 3 kit, Rfl: 12    traZODone (DESYREL) 100 MG tablet, Take 200 mg by mouth every evening., Disp: , Rfl:       Review of Systems:  Constitutional: no fever or  chills  Eyes: no visual changes  ENT: no nasal congestion or sore throat  Respiratory: no cough or shortness of breath  Cardiovascular: no chest pain  Gastrointestinal: no nausea or vomiting, tolerating diet  Musculoskeletal: pain and soreness    OBJECTIVE:      Vital Signs (Most Recent):  Vitals:    03/06/19 0843   BP: 118/86     There is no height or weight on file to calculate BMI.      Physical Exam:  Constitutional: The patient appears well-developed and well-nourished. No distress.   Head: Normocephalic and atraumatic.   Nose: Nose normal.   Eyes: Conjunctivae and EOM are normal.   Neck: No tracheal deviation present.   Cardiovascular: Normal rate and intact distal pulses.    Pulmonary/Chest: Effort normal. No respiratory distress.   Abdominal: There is no guarding.   Neurological: The patient is alert.   Psychiatric: The patient has a normal mood and affect.     Right Hand/Wrist Examination:    Observation/Inspection:  Swelling  none    Deformity  none  Discoloration  none     Scars   none    Atrophy  none    HAND/WRIST EXAMINATION:  Finkelstein's Test   +  WHAT Test     ++  Snuff box tenderness   Neg  Nieves's Test    Neg  Hook of Hamate Tenderness  Neg  CMC grind    Neg  Circumduction test   Neg    Neurovascular Exam:  Digits WWP, brisk CR < 3s throughout  NVI motor/LTS to M/R/U nerves, radial pulse 2+  Tinel's Test - Carpal Tunnel  Neg  Tinel's Test - Cubital Tunnel  Neg  Phalen's Test    Neg  Median Nerve Compression Test Neg    ROM hand/wrist/elbow full, painless      Diagnostic Results:     Xray - right wrist xrays with no acute fractures or dislocations  EMG - none    ASSESSMENT/PLAN:      18 y.o. yo female with right deQuervains tenosynovitis  1) Natural history discussed  2) Discussed injection - but patient reports steroids have no effect on her and would like to defer this today  3) She already has a thumb spica splint - we tried on the modabber but this did not fit her  4) Recommend NSAIDs prn  pain  5) Discussed OT for custom brace but patient prefers to obtain OTC brace  6) F/U as needed        Rosas Daley M.D.

## 2019-03-15 ENCOUNTER — TELEPHONE (OUTPATIENT)
Dept: SPORTS MEDICINE | Facility: CLINIC | Age: 19
End: 2019-03-15

## 2019-03-15 DIAGNOSIS — M17.12 DEGENERATIVE ARTHRITIS OF LEFT KNEE: Primary | ICD-10-CM

## 2019-03-15 NOTE — TELEPHONE ENCOUNTER
----- Message from Mary Brady sent at 3/15/2019  3:15 PM CDT -----  Contact: pt mother/Ms Urbina  Please call pt mother at [phone]    Patient has questions regarding future Euflexxa injections    Thank you

## 2019-03-18 ENCOUNTER — TELEPHONE (OUTPATIENT)
Dept: SPORTS MEDICINE | Facility: CLINIC | Age: 19
End: 2019-03-18

## 2019-03-18 NOTE — TELEPHONE ENCOUNTER
----- Message from Nia Salcido sent at 3/18/2019  1:52 PM CDT -----  Karla - can you call and schedule Euflexxa series with patient. Okay to schedule with brandi

## 2019-04-09 ENCOUNTER — TELEPHONE (OUTPATIENT)
Dept: SPORTS MEDICINE | Facility: CLINIC | Age: 19
End: 2019-04-09

## 2019-04-09 DIAGNOSIS — M17.12 DEGENERATIVE ARTHRITIS OF LEFT KNEE: Primary | ICD-10-CM

## 2019-04-09 NOTE — TELEPHONE ENCOUNTER
----- Message from Mary Brady sent at 4/9/2019 10:21 AM CDT -----  Contact: pt mother/Edelmira  Please call pt mother at [phone]    Patient mother would like to schedule future series injections    Thank you

## 2019-05-14 ENCOUNTER — OFFICE VISIT (OUTPATIENT)
Dept: SPORTS MEDICINE | Facility: CLINIC | Age: 19
End: 2019-05-14
Payer: MEDICAID

## 2019-05-14 VITALS
HEART RATE: 107 BPM | HEIGHT: 65 IN | WEIGHT: 212 LBS | SYSTOLIC BLOOD PRESSURE: 136 MMHG | BODY MASS INDEX: 35.32 KG/M2 | DIASTOLIC BLOOD PRESSURE: 88 MMHG

## 2019-05-14 DIAGNOSIS — M17.12 PRIMARY OSTEOARTHRITIS OF LEFT KNEE: Primary | ICD-10-CM

## 2019-05-14 PROCEDURE — 99213 OFFICE O/P EST LOW 20 MIN: CPT | Mod: PBBFAC,PO,25 | Performed by: PHYSICIAN ASSISTANT

## 2019-05-14 PROCEDURE — 20610 DRAIN/INJ JOINT/BURSA W/O US: CPT | Mod: S$PBB,LT,, | Performed by: PHYSICIAN ASSISTANT

## 2019-05-14 PROCEDURE — 99499 NO LOS: ICD-10-PCS | Mod: S$PBB,,, | Performed by: PHYSICIAN ASSISTANT

## 2019-05-14 PROCEDURE — 99999 PR PBB SHADOW E&M-EST. PATIENT-LVL III: CPT | Mod: PBBFAC,,, | Performed by: PHYSICIAN ASSISTANT

## 2019-05-14 PROCEDURE — 20610 PR DRAIN/INJECT LARGE JOINT/BURSA: ICD-10-PCS | Mod: S$PBB,LT,, | Performed by: PHYSICIAN ASSISTANT

## 2019-05-14 PROCEDURE — 99999 PR PBB SHADOW E&M-EST. PATIENT-LVL III: ICD-10-PCS | Mod: PBBFAC,,, | Performed by: PHYSICIAN ASSISTANT

## 2019-05-14 PROCEDURE — 20610 DRAIN/INJ JOINT/BURSA W/O US: CPT | Mod: PBBFAC,PO | Performed by: PHYSICIAN ASSISTANT

## 2019-05-14 PROCEDURE — 99499 UNLISTED E&M SERVICE: CPT | Mod: S$PBB,,, | Performed by: PHYSICIAN ASSISTANT

## 2019-05-14 RX ORDER — LITHIUM CARBONATE 450 MG/1
450 TABLET ORAL EVERY 12 HOURS
COMMUNITY
End: 2020-08-27

## 2019-05-14 RX ADMIN — Medication 20 MG: at 02:05

## 2019-05-14 NOTE — PROGRESS NOTES
Patient is here for follow up of left knee arthritis. Patient is requesting Euflexxa injection #1.  [facility] Has failed other conservative modalities including NSAIDS, activity modification, weight loss.    The prior shot was tolerated well.    PHYSICAL EXAMINATION:     General: The patient is alert and oriented x 3. Mood is pleasant.   Observation of ears, eyes and nose reveals no gross abnormalities. No   labored breathing observed.     No signs of infection or adverse reaction to knee.       Injection Procedure  A time out was performed, including verification of patient ID, procedure, site and side, availability of information and equipment, review of safety issues, and agreement with consent, the procedure site was marked.    After time out was performed, the patient was prepped aseptically with povidone-iodine swabsticks. A diagnostic and therapeutic injection of 2cc Euflexxa was given under sterile technique using a 22.5 gauge needle from the Anterolateral aspect of the left Knee Joint in the sitting position. Lot No: [id number]. Exp date: 2020-04-14.     Sheila Urbina had no adverse reactions to the medication. Pain decreased. She was instructed to apply ice to the joint for 20 minutes and avoid strenuous activities for 24-36 hours following the injection. She was warned of possible blood sugar and/or blood pressure changes during that time. Following that time, she can resume regular activities.    She was reminded to call the clinic immediately for any adverse side effects as explained in clinic today.

## 2019-05-14 NOTE — LETTER
May 14, 2019      Ji Vazquez MD  [address]  [facility] [address]           [facility]  [address]  [facility] [address]  Phone: [phone]          Patient: Sheila Urbina   MR Number: [id number]   YOB: 2000   Date of Visit: 5/14/2019       Dear Dr. Ji Vazquez:    Thank you for referring Sheila Urbina to me for evaluation. Attached you will find relevant portions of my assessment and plan of care.    If you have questions, please do not hesitate to call me. I look forward to following Sheila Urbina along with you.    Sincerely,    Shaina Vivas PA-C    Enclosure  CC:  No Recipients    If you would like to receive this communication electronically, please contact [email or web address] or [phone] to request more information on [employer] Link access.    For providers and/or their staff who would like to refer a patient to [facility], please contact us through our one-stop-shop provider referral line, [facility], at [phone].    If you feel you have received this communication in error or would no longer like to receive these types of communications, please e-mail [email or web address]

## 2019-05-23 ENCOUNTER — OFFICE VISIT (OUTPATIENT)
Dept: SPORTS MEDICINE | Facility: CLINIC | Age: 19
End: 2019-05-23
Payer: MEDICAID

## 2019-05-23 VITALS
DIASTOLIC BLOOD PRESSURE: 94 MMHG | HEIGHT: 65 IN | WEIGHT: 212 LBS | HEART RATE: 94 BPM | BODY MASS INDEX: 35.32 KG/M2 | SYSTOLIC BLOOD PRESSURE: 136 MMHG

## 2019-05-23 DIAGNOSIS — M17.12 PRIMARY OSTEOARTHRITIS OF LEFT KNEE: Primary | ICD-10-CM

## 2019-05-23 PROCEDURE — 99499 NO LOS: ICD-10-PCS | Mod: S$PBB,,, | Performed by: PHYSICIAN ASSISTANT

## 2019-05-23 PROCEDURE — 99999 PR PBB SHADOW E&M-EST. PATIENT-LVL III: CPT | Mod: PBBFAC,,, | Performed by: PHYSICIAN ASSISTANT

## 2019-05-23 PROCEDURE — 20610 PR DRAIN/INJECT LARGE JOINT/BURSA: ICD-10-PCS | Mod: S$PBB,LT,, | Performed by: PHYSICIAN ASSISTANT

## 2019-05-23 PROCEDURE — 99499 UNLISTED E&M SERVICE: CPT | Mod: S$PBB,,, | Performed by: PHYSICIAN ASSISTANT

## 2019-05-23 PROCEDURE — 20610 DRAIN/INJ JOINT/BURSA W/O US: CPT | Mod: S$PBB,LT,, | Performed by: PHYSICIAN ASSISTANT

## 2019-05-23 PROCEDURE — 20610 DRAIN/INJ JOINT/BURSA W/O US: CPT | Mod: PBBFAC,PO,LT | Performed by: PHYSICIAN ASSISTANT

## 2019-05-23 PROCEDURE — 99213 OFFICE O/P EST LOW 20 MIN: CPT | Mod: PBBFAC,PO | Performed by: PHYSICIAN ASSISTANT

## 2019-05-23 PROCEDURE — 99999 PR PBB SHADOW E&M-EST. PATIENT-LVL III: ICD-10-PCS | Mod: PBBFAC,,, | Performed by: PHYSICIAN ASSISTANT

## 2019-05-23 RX ADMIN — Medication 20 MG: at 11:05

## 2019-05-23 NOTE — PROGRESS NOTES
Patient is here for follow up of left knee arthritis. Patient is requesting Euflexxa injection #2.  [facility] Has failed other conservative modalities including NSAIDS, activity modification, weight loss.    The prior shot was tolerated well.    PHYSICAL EXAMINATION:     General: The patient is alert and oriented x 3. Mood is pleasant.   Observation of ears, eyes and nose reveals no gross abnormalities. No   labored breathing observed.     No signs of infection or adverse reaction to knee.       Injection Procedure  A time out was performed, including verification of patient ID, procedure, site and side, availability of information and equipment, review of safety issues, and agreement with consent, the procedure site was marked.    After time out was performed, the patient was prepped aseptically with povidone-iodine swabsticks. A diagnostic and therapeutic injection of 2cc Euflexxa was given under sterile technique using a 22.5 gauge needle from the Anterolateral aspect of the left Knee Joint in the sitting position. Lot No: [id number]. Exp date: 2020-05-03.     Sheila Urbina had no adverse reactions to the medication. Pain decreased. She was instructed to apply ice to the joint for 20 minutes and avoid strenuous activities for 24-36 hours following the injection. She was warned of possible blood sugar and/or blood pressure changes during that time. Following that time, she can resume regular activities.    She was reminded to call the clinic immediately for any adverse side effects as explained in clinic today.

## 2019-05-23 NOTE — LETTER
May 23, 2019      Ji Vazquez MD  [address]  [facility] [address]           [facility]  [address]  [facility] [address]  Phone: [phone]          Patient: Sheila Urbina   MR Number: [id number]   YOB: 2000   Date of Visit: 5/23/2019       Dear Dr. Ji Vazquez:    Thank you for referring Sheila Urbina to me for evaluation. Attached you will find relevant portions of my assessment and plan of care.    If you have questions, please do not hesitate to call me. I look forward to following Sheila Urbina along with you.    Sincerely,    Shaina Vivas PA-C    Enclosure  CC:  No Recipients    If you would like to receive this communication electronically, please contact [email or web address] or [phone] to request more information on [employer] access.    For providers and/or their staff who would like to refer a patient to [facility], please contact us through our one-stop-shop provider referral line, [facility], at [phone].    If you feel you have received this communication in error or would no longer like to receive these types of communications, please e-mail [email or web address]

## 2019-05-30 ENCOUNTER — OFFICE VISIT (OUTPATIENT)
Dept: SPORTS MEDICINE | Facility: CLINIC | Age: 19
End: 2019-05-30
Payer: MEDICAID

## 2019-05-30 VITALS
SYSTOLIC BLOOD PRESSURE: 134 MMHG | BODY MASS INDEX: 35.32 KG/M2 | HEIGHT: 65 IN | HEART RATE: 104 BPM | DIASTOLIC BLOOD PRESSURE: 94 MMHG | WEIGHT: 212 LBS

## 2019-05-30 DIAGNOSIS — M17.12 DEGENERATIVE ARTHRITIS OF LEFT KNEE: Primary | ICD-10-CM

## 2019-05-30 PROCEDURE — 20610 LARGE JOINT ASPIRATION/INJECTION: L KNEE: ICD-10-PCS | Mod: S$PBB,LT,, | Performed by: ORTHOPAEDIC SURGERY

## 2019-05-30 PROCEDURE — 20610 DRAIN/INJ JOINT/BURSA W/O US: CPT | Mod: PBBFAC,PO,LT | Performed by: ORTHOPAEDIC SURGERY

## 2019-05-30 PROCEDURE — 99499 NO LOS: ICD-10-PCS | Mod: S$PBB,,, | Performed by: ORTHOPAEDIC SURGERY

## 2019-05-30 PROCEDURE — 99999 PR PBB SHADOW E&M-EST. PATIENT-LVL III: CPT | Mod: PBBFAC,,, | Performed by: ORTHOPAEDIC SURGERY

## 2019-05-30 PROCEDURE — 99213 OFFICE O/P EST LOW 20 MIN: CPT | Mod: PBBFAC,PO,25 | Performed by: ORTHOPAEDIC SURGERY

## 2019-05-30 PROCEDURE — 99999 PR PBB SHADOW E&M-EST. PATIENT-LVL III: ICD-10-PCS | Mod: PBBFAC,,, | Performed by: ORTHOPAEDIC SURGERY

## 2019-05-30 PROCEDURE — 99499 UNLISTED E&M SERVICE: CPT | Mod: S$PBB,,, | Performed by: ORTHOPAEDIC SURGERY

## 2019-05-30 RX ADMIN — Medication 20 MG: at 01:05

## 2019-05-30 NOTE — PROCEDURES
Large Joint Aspiration/Injection: L knee  Date/Time: 5/30/2019 1:15 PM  Performed by: Ji Vazquez MD  Authorized by: Ji Vazquez MD     Consent Done?:  Yes (Verbal)  Indications:  Pain  Procedure site marked: Yes    Timeout: Prior to procedure the correct patient, procedure, and site was verified      Location:  Knee  Site:  L knee  Prep: Patient was prepped and draped in usual sterile fashion    Ultrasonic Guidance for needle placement: No  Needle size:  22 G  Approach:  Superior  Medications:  20 mg sodium hyaluronate (EUFLEXXA) 10 mg/mL(mw 2.4 -3.6 million)  Patient tolerance:  Patient tolerated the procedure well with no immediate complications

## 2019-05-30 NOTE — PROGRESS NOTES
Euflexxa Injection Procedure #3     A time out was performed, including verification of patient ID, procedure, site and side, availability of information and equipment, review of safety issues, and agreement with consent, the procedure site was marked.     The patient was prepped aseptically with povidone-iodine swabsticks. A diagnostic and therapeutic injection of 2cc Euflexxa was given under sterile technique using a 21.5g x 1.5 needle from the superolateral aspect of the left knee joint in the supine position.       Sheila Urbina had no adverse reactions to the medication. Pain decreased. She was instructed to apply ice to the joint for 20 minutes and avoid strenuous activities for 24-36 hours following the injection. She was warned of possible blood pressure changes during that time. Following that time, she can resume activities as prior to the injection.     She was reminded to call the clinic immediately for any adverse side effects as explained in clinic today.     Lot: [id number]  Exp: 2020-05-05

## 2019-07-15 ENCOUNTER — TELEPHONE (OUTPATIENT)
Dept: SPORTS MEDICINE | Facility: CLINIC | Age: 19
End: 2019-07-15

## 2019-07-15 NOTE — TELEPHONE ENCOUNTER
----- Message from Tiffanie Easley sent at 7/15/2019  1:49 PM CDT -----  Contact: Edelmira ( mom)  Edelmira is needing a call back in regards to the pt needing a sooner appointment to schedule for her left foot pain, she stated that the pt twisted her ankle and needs it looked at     First available I had was 08/27 contact Edelmira @ [phone]

## 2019-07-19 ENCOUNTER — OFFICE VISIT (OUTPATIENT)
Dept: SPORTS MEDICINE | Facility: CLINIC | Age: 19
End: 2019-07-19
Payer: MEDICAID

## 2019-07-19 ENCOUNTER — HOSPITAL ENCOUNTER (OUTPATIENT)
Dept: RADIOLOGY | Facility: HOSPITAL | Age: 19
Discharge: HOME | End: 2019-07-19
Attending: NEUROMUSCULOSKELETAL MEDICINE & OMM
Payer: MEDICAID

## 2019-07-19 VITALS
DIASTOLIC BLOOD PRESSURE: 99 MMHG | WEIGHT: 212 LBS | BODY MASS INDEX: 35.32 KG/M2 | SYSTOLIC BLOOD PRESSURE: 136 MMHG | HEIGHT: 65 IN | HEART RATE: 105 BPM

## 2019-07-19 DIAGNOSIS — S93.409A MILD ANKLE SPRAIN, INITIAL ENCOUNTER: ICD-10-CM

## 2019-07-19 DIAGNOSIS — M79.672 ACUTE FOOT PAIN, LEFT: ICD-10-CM

## 2019-07-19 DIAGNOSIS — S93.602A FOOT SPRAIN, LEFT, INITIAL ENCOUNTER: ICD-10-CM

## 2019-07-19 DIAGNOSIS — M79.672 ACUTE FOOT PAIN, LEFT: Primary | ICD-10-CM

## 2019-07-19 PROCEDURE — 73620 XR FOOT 2 VIEW BILATERAL: ICD-10-PCS | Mod: 26,50,, | Performed by: RADIOLOGY

## 2019-07-19 PROCEDURE — 99999 PR PBB SHADOW E&M-EST. PATIENT-LVL III: CPT | Mod: PBBFAC,,, | Performed by: NEUROMUSCULOSKELETAL MEDICINE & OMM

## 2019-07-19 PROCEDURE — 99214 PR OFFICE/OUTPT VISIT, EST, LEVL IV, 30-39 MIN: ICD-10-PCS | Mod: S$PBB,,, | Performed by: NEUROMUSCULOSKELETAL MEDICINE & OMM

## 2019-07-19 PROCEDURE — 73630 XR FOOT COMPLETE 3 VIEW LEFT: ICD-10-PCS | Mod: 26,LT,, | Performed by: RADIOLOGY

## 2019-07-19 PROCEDURE — 73630 X-RAY EXAM OF FOOT: CPT | Mod: 26,LT,, | Performed by: RADIOLOGY

## 2019-07-19 PROCEDURE — 73630 X-RAY EXAM OF FOOT: CPT | Mod: TC,FY,PO,LT

## 2019-07-19 PROCEDURE — 73620 X-RAY EXAM OF FOOT: CPT | Mod: 50,TC,FY,PO

## 2019-07-19 PROCEDURE — 99999 PR PBB SHADOW E&M-EST. PATIENT-LVL III: ICD-10-PCS | Mod: PBBFAC,,, | Performed by: NEUROMUSCULOSKELETAL MEDICINE & OMM

## 2019-07-19 PROCEDURE — 99214 OFFICE O/P EST MOD 30 MIN: CPT | Mod: S$PBB,,, | Performed by: NEUROMUSCULOSKELETAL MEDICINE & OMM

## 2019-07-19 PROCEDURE — 73620 X-RAY EXAM OF FOOT: CPT | Mod: 26,50,, | Performed by: RADIOLOGY

## 2019-07-19 PROCEDURE — 99213 OFFICE O/P EST LOW 20 MIN: CPT | Mod: PBBFAC,25,PO | Performed by: NEUROMUSCULOSKELETAL MEDICINE & OMM

## 2019-07-19 RX ORDER — MELOXICAM 7.5 MG/1
7.5 TABLET ORAL DAILY
Qty: 30 TABLET | Refills: 0 | Status: SHIPPED | OUTPATIENT
Start: 2019-07-19 | End: 2020-08-27

## 2019-07-19 NOTE — PROGRESS NOTES
"Subjective:     Sheila Urbina     Chief Complaint   Patient presents with    Left Foot - Pain       HPI    Trisha is a 18 y.o. female coming in today for left foot pain that began 2 week(s) ago, referred by Dr. Vazquez (pt of his for her L knee). Pt. describes the pain as a 6/10 achy pain that does not radiate. There was a fall/injury/ or trauma associated with the onset of symptoms. She was carrying a water jug and her ankle "gave out", twisting her foot. She did not fall. She noted immediate swelling to the dorsum of the foot. She has been seen at the ED twice with negative xrays. The pain is better with rest, elevation, compression and worse with weight bearing. She notes increased swelling in her foot at the end of the day.  Pt. Denies any other musculoskeletal complaints at this time.     Joint instability? yes  Mechanical locking/clicking? yes  Affecting ADL's? yes  Affecting sleep? yes    Occupation: student at [employer], studying teaching    Review of Systems   Constitutional: Negative for chills and fever.   HENT: Negative for hearing loss and tinnitus.    Eyes: Negative for blurred vision and photophobia.   Respiratory: Negative for cough and shortness of breath.    Cardiovascular: Negative for chest pain and leg swelling.   Gastrointestinal: Negative for abdominal pain, heartburn, nausea and vomiting.   Genitourinary: Negative for dysuria and hematuria.   Musculoskeletal: Positive for joint pain. Negative for back pain, falls, myalgias and neck pain.   Skin: Negative for rash.   Neurological: Negative for dizziness, tingling, focal weakness, weakness and headaches.   Endo/Heme/Allergies: Positive for environmental allergies. Does not bruise/bleed easily.   Psychiatric/Behavioral: Positive for depression. The patient is nervous/anxious.        PAST MEDICAL HISTORY:   Past Medical History:   Diagnosis Date    Aggressive behavior     post anesthesia    Granuloma of skin     left lower eye lid at " age 4    Ovarian cyst 2013    Recurrent dislocation of left patella     Vision abnormalities      PAST SURGICAL HISTORY:   Past Surgical History:   Procedure Laterality Date    ARTHROSCOPY, KNEE Left 8/1/2013    [facility]    ARTHROSCOPY-KNEE Left 12/29/2016    [facility]    ARTHROSCOPY-KNEE Left 3/30/2015    [facility]    ARTHROSCOPY-KNEE W/ CHONDROPLASTY  8/3/2017    [facility]    ARTHROSCOPY-KNEE W/ CHONDROPLASTY Left 12/29/2016    [facility]    ARTHROSCOPY-KNEE, lateral release Left 8/3/2017    [facility]    EYE SURGERY      granuloma removal under left eye    KNEE ARTHROSCOPY      Osteotomy of tibia      OSTEOTOMY-FEMUR distal arthrex Left 8/3/2017    [facility]    OSTEOTOMY-TIBIA (Tibial tubercle transfer) Left 3/30/2015    [facility]    PATELLA REALIGNMENT      RECONSTRUCTION, LIGAMENT, MEDIAL PATELLOFEMORAL, RIGHT Left 8/1/2013    [facility]    RELEASE-LATERAL  8/3/2017    [facility]    REMOVAL-HARDWARE-LEG Left 12/29/2016    [facility]    REPAIR - MEDIAL PATELLA FEMORAL LIGAMENT Left 3/30/2015    [facility]     FAMILY HISTORY:   Family History   Problem Relation Age of Onset    Diabetes Mother     Seizures Father      SOCIAL HISTORY:   Social History     Socioeconomic History    Marital status: Single     Spouse name: Not on file    Number of children: Not on file    Years of education: Not on file    Highest education level: Not on file   Occupational History    Not on file   Social Needs     Financial resource strain: Not on file    Food insecurity:     Worry: Not on file     Inability: Not on file    Transportation needs:     Medical: Not on file     Non-medical: Not on file   Tobacco Use    Smoking status: Never Smoker    Smokeless tobacco: Never Used   Substance and Sexual Activity    Alcohol use: No    Drug use: No    Sexual activity: Never   Lifestyle    Physical activity:     Days per week: Not on file     Minutes per session: Not on file    Stress: Not on file   Relationships    Social connections:     Talks on phone: Not on file     Gets together: Not on file     Attends Tenriism service: Not on file     Active member of club or organization: Not on file     Attends meetings of clubs or organizations: Not on file     Relationship status: Not on file   Other Topics Concern    Not on file   Social History Narrative    Not on file       MEDICATIONS:   Current Outpatient Medications:     diazePAM (VALIUM) 2 MG tablet, TAKE 1 TABLET BY MOUTH EVERY 6 HOURS AS NEEDED FOR MUSCLE SPASMS, Disp: 30 tablet, Rfl: 0    ESTARYLLA 0.25-35 mg-mcg per tablet, TK 1 T PO QD ATC, Disp: , Rfl: 3    LATUDA 40 mg Tab tablet, 40 mg once daily. , Disp: , Rfl: 1    lithium (ESKALITH) 450 MG TbSR, Take 450 mg by mouth every 12 (twelve) hours., Disp: , Rfl:     lorazepam (ATIVAN) 0.5 MG tablet, Take 1 tablet (0.5 mg total) by mouth 2 (two) times daily., Disp: 3 tablet, Rfl: 0    nebulizer accessories Kit, 3 neb kits per month for 12 months, Disp: 3 kit, Rfl: 12    traZODone (DESYREL) 100 MG tablet, Take 200 mg by mouth every evening., Disp: , Rfl:     albuterol (PROVENTIL) 2.5 mg /3 mL (0.083 %) nebulizer solution, Take 3 mLs (2.5 mg total) by nebulization every 4 (four) hours as needed., Disp: 60 each, Rfl: 3    meloxicam (MOBIC) 7.5 MG tablet, Take 1 tablet (7.5 mg total) by mouth once daily. Take with food, Disp: 30 tablet, Rfl: 0  ALLERGIES: Review of patient's allergies indicates:  No Known  "Allergies    Reviewed ED visit on 19:  IMAGIN. X-ray ordered due to left foot pain. (AP, lateral, and oblique views) taken 19.   2. X-ray images were reviewed personally by me and then directly with patient.  3. FINDINGS: X-ray images obtained demonstrate no fracture or dislocation. Soft tissues are unremarkable.  4. IMPRESSION: No pathology or irregularities appreciated.       Objective:     VITAL SIGNS: BP (!) 136/99   Pulse 105   Ht 5' 5" (1.651 m)   Wt 96.2 kg (212 lb)   LMP 2019   BMI 35.28 kg/m²    General    Nursing note and vitals reviewed.  Constitutional: She is oriented to person, place, and time. She appears well-developed and well-nourished.   HENT:   Head: Normocephalic and atraumatic.   no nasal discharge, no external ear redness or discharge   Eyes:   EOM is full and smooth  No eye redness or discharge   Neck: Neck supple. No tracheal deviation present.   Cardiovascular: Normal rate.    2+ Radial pulse bilaterally  2+ Dorsalis Pedis pulse bilaterally  No LE edema appreciated   Pulmonary/Chest: Effort normal. No respiratory distress.   Abdominal: She exhibits no distension.   No rigidity   Neurological: She is alert and oriented to person, place, and time. She exhibits normal muscle tone. Coordination normal.   See details below   Psychiatric: She has a normal mood and affect. Her behavior is normal.               MUSCULOSKELETAL EXAM  ANKLE: left ANKLE  The affected ankle is compared to the contralateral ankle.    Observation:    There is edema and ecchymosis at the dorsal distal 3-4th metatarsals as well as the lateral malleolus/calcaneus.  No plantar ecchymosis appreciated  Mild hallux valgus  Normal callus pattern on the feet bilaterally.    Achilles tendon and calcaneal insertion reveals no deformities  No leg or intrinsic foot muscle atrophy.  Left antalgic gait    ROM (* = with pain):  Active dorsiflexion to 20° on left* and 20° on right  Active plantarflexion to 50° " on left* and 50° on right    Active ankle inversion to 35° on left* and 35° on right  Active ankle eversion to 15° on left and 15° on right  Full active flexion/extension of the toes bilaterally.   Heel cords without tightness bilaterally.    Tenderness To Palpation:  Mild tenderness at the ATFL. No tenderness at the  CFL, PTFL, or deltoid ligaments  No tenderness over the distal anterior syndesmosis, distal tibia/fibula, fibular head/shaft  No tenderness at medial or lateral malleoli   + tenderness at the cuboid and lateral cuneiform  Mild tenderness along the proximal 3rd and 4th metatarsals   No tenderness at the Achilles tendon calcaneal insertion  No tenderness at the posterior tibial or peroneal tendons    Strength Testing (* = with pain):  Dorsiflexion - 5/5 on left* and 5/5 on right  Platarflexion - 5/5 on left* and 5/5 on right  Resisted Inversion - 5/5 on left* and 5/5 on right  Resisted Eversion - 5/5 on left and 5/5 on right  Great Toe Extension - 5/5 on left and 5/5 on right  Great Toe Flexion - 5/5 on left and 5/5 on right    Special Tests:  Anterior talar drawer - negative and without dimpling  Talar tilt - negative  Reverse Talar tilt - negative    Heel tap test - positive for midfoot pain  Distal tib/fib squeeze test - negative  External rotation stress (Kleiger) test - negative  Murray squeeze test - negative    Metatarsal squeeze test - negative  Midfoot stress test - positive  Calcaneal squeeze test - negative    No subluxation of the peroneal tendons with resisted eversion.    Vascular/Sensory Exam:  DP and PT pulses intact bilaterally  No skin changes, no abnormal hair distribution  Sensation intact to light touch throughout the saphenous, sural, superficial peroneal, deep peroneal, and tibial nerve distributions  Negative Tinel's test over tarsal tunnel  2+/4 reflexes at L4 and S1 dermatomes  Capillary refill intact <2 seconds in all toes bilaterally.    IMAGIN. X-ray ordered due to  left foot pain. (AP, lateral, and oblique and bilateral AP WBing views) taken today.   2. X-ray images were reviewed personally by me and then directly with patient.  3. FINDINGS: X-ray images obtained demonstrate mild hallux valgus.  No fracture or dislocation.  No bone destruction identified. No significant gapping at the midfoot when comparing to contralateral foot with weight-bearing.  4. IMPRESSION: No pathology or irregularities appreciated.          Assessment:      Encounter Diagnoses   Name Primary?    Acute foot pain, left Yes    Foot sprain, left, initial encounter     Mild ankle sprain, initial encounter           Plan:     1.  Acute left foot pain secondary to lateral midfoot sprain and associated mild lateral ankle sprain status post fall 2 weeks ago.  Repeat x-rays today were negative. Due to significant pain with ambulation patient was placed in a left Cam walking boot to wear for the next 2 weeks.  - Recommend ice of 20 min at a time and Mobic 7.5 mg p.o. q.day times 1-2 weeks, then as needed for pain control  - Recommend doing left ankle range of motion exercises outside of the boot once to twice daily to avoid ankle stiffness  - Recommend using a right foot even up lift to prevent uneven gait while walking  - If left foot pain persists in walking boot after 2-3 weeks, plan to obtain a left foot MRI for further evaluation  -  X-ray images of left foot taken today (AP, lateral, and oblique and bilateral AP weightbearing views) showed no abnormalities. Images were personally reviewed with patient.    2. Follow-up in 2-3 weeks for reevaluation    3. Patient agreeable to today's plan and all questions were answered    This note is dictated using the M*Modal Fluency Direct word recognition program. There are word recognition mistakes that are occasionally missed on review.

## 2019-08-02 ENCOUNTER — OFFICE VISIT (OUTPATIENT)
Dept: SPORTS MEDICINE | Facility: CLINIC | Age: 19
End: 2019-08-02
Payer: MEDICAID

## 2019-08-02 VITALS
HEIGHT: 65 IN | WEIGHT: 212 LBS | DIASTOLIC BLOOD PRESSURE: 98 MMHG | BODY MASS INDEX: 35.32 KG/M2 | SYSTOLIC BLOOD PRESSURE: 146 MMHG | HEART RATE: 95 BPM

## 2019-08-02 DIAGNOSIS — M79.672 ACUTE FOOT PAIN, LEFT: Primary | ICD-10-CM

## 2019-08-02 DIAGNOSIS — S93.602A FOOT SPRAIN, LEFT, INITIAL ENCOUNTER: ICD-10-CM

## 2019-08-02 PROCEDURE — 99999 PR PBB SHADOW E&M-EST. PATIENT-LVL III: CPT | Mod: PBBFAC,,, | Performed by: NEUROMUSCULOSKELETAL MEDICINE & OMM

## 2019-08-02 PROCEDURE — 99213 OFFICE O/P EST LOW 20 MIN: CPT | Mod: PBBFAC,PO | Performed by: NEUROMUSCULOSKELETAL MEDICINE & OMM

## 2019-08-02 PROCEDURE — 99999 PR PBB SHADOW E&M-EST. PATIENT-LVL III: ICD-10-PCS | Mod: PBBFAC,,, | Performed by: NEUROMUSCULOSKELETAL MEDICINE & OMM

## 2019-08-02 PROCEDURE — 99214 PR OFFICE/OUTPT VISIT, EST, LEVL IV, 30-39 MIN: ICD-10-PCS | Mod: S$PBB,,, | Performed by: NEUROMUSCULOSKELETAL MEDICINE & OMM

## 2019-08-02 PROCEDURE — 99214 OFFICE O/P EST MOD 30 MIN: CPT | Mod: S$PBB,,, | Performed by: NEUROMUSCULOSKELETAL MEDICINE & OMM

## 2019-08-02 NOTE — PROGRESS NOTES
Subjective:     Sheila Urbina     Chief Complaint   Patient presents with    Left Foot - Follow-up       HPI      Trisha is a 18 y.o. female coming in today for left foot pain. Since last visit the pain has Improved. The pain is better with rest, boot and worse with end of the day. Pt. describes the pain as a 1/10 dull pain that does not radiate. There has not been any new a fall/injury/ or traumas since last visit.  Pt. denies any new musculoskeletal complaints at this time.     Office note from 7/19/19 reviewed    Review of Systems   Constitutional: Negative for chills and fever.   Musculoskeletal: Positive for joint pain. Negative for back pain, falls, myalgias and neck pain.   Neurological: Negative for dizziness, tingling, focal weakness, weakness and headaches.       PAST MEDICAL HISTORY:   Past Medical History:   Diagnosis Date    Aggressive behavior     post anesthesia    Granuloma of skin     left lower eye lid at age 4    Ovarian cyst 2013    Recurrent dislocation of left patella     Vision abnormalities      PAST SURGICAL HISTORY:   Past Surgical History:   Procedure Laterality Date    ARTHROSCOPY, KNEE Left 8/1/2013    Performed [facility]    ARTHROSCOPY-KNEE Left 12/29/2016    Per[facility]    ARTHROSCOPY-KNEE Left 3/30/2015    [facility]    ARTHROSCOPY-KNEE W/ CHONDROPLASTY  8/3/2017    Performed by [facility]    ARTHROSCOPY-KNEE W/ CHONDROPLASTY Left 12/29/2016    [facility]    ARTHROSCOPY-KNEE, lateral release Left 8/3/2017    Performed by [facility]ader Urbina MD at [facility] OR [address] [place] [address]    EYE SURGERY      granuloma removal under left eye    KNEE ARTHROSCOPY      Osteotomy of tibia      OSTEOTOMY-FEMUR distal arthrex Left 8/3/2017    Performed by [facility]ader Urbina [facility] at [facility] OR [address] [place] [address]    OSTEOTOMY-TIBIA (Tibial  tubercle transfer) Left 3/30/2015    [facility]    PATELLA REALIGNMENT      RECONSTRUCTION, LIGAMENT, MEDIAL PATELLOFEMORAL, RIGHT Left 8/1/2013    [facility]    RELEASE-LATERAL  8/3/2017    [facility]    REMOVAL-HARDWARE-LEG Left 12/29/2016    [facility]    REPAIR - MEDIAL PATELLA FEMORAL LIGAMENT Left 3/30/2015    [facility]     FAMILY HISTORY:   Family History   Problem Relation Age of Onset    Diabetes Mother     Seizures Father      SOCIAL HISTORY:   Social History     Socioeconomic History    Marital status: Single     Spouse name: Not on file    Number of children: Not on file    Years of education: Not on file    Highest education level: Not on file   Occupational History    Not on file   Social Needs    Financial resource strain: Not on file    Food insecurity:     Worry: Not on file     Inability: Not on file    Transportation needs:     Medical: Not on file     Non-medical: Not on file   Tobacco Use    Smoking status: Never Smoker    Smokeless tobacco: Never Used   Substance and Sexual Activity    Alcohol use: No    Drug use: No    Sexual activity: Never   Lifestyle    Physical activity:     Days per week: Not on file     Minutes per session: Not on file    Stress: Not on file   Relationships    Social connections:     Talks on phone: Not on file     Gets together: Not on file     Attends Sabianist service: Not on file     Active member of club or organization: Not on file     Attends meetings of clubs or organizations: Not on file     Relationship status: Not on file   Other Topics Concern    Not on file   Social History Narrative    Not on file       MEDICATIONS:   Current Outpatient Medications:     albuterol (PROVENTIL) 2.5 mg /3 mL (0.083 %) nebulizer solution, Take 3 mLs  "(2.5 mg total) by nebulization every 4 (four) hours as needed., Disp: 60 each, Rfl: 3    diazePAM (VALIUM) 2 MG tablet, TAKE 1 TABLET BY MOUTH EVERY 6 HOURS AS NEEDED FOR MUSCLE SPASMS, Disp: 30 tablet, Rfl: 0    ESTARYLLA 0.25-35 mg-mcg per tablet, TK 1 T PO QD ATC, Disp: , Rfl: 3    LATUDA 40 mg Tab tablet, 40 mg once daily. , Disp: , Rfl: 1    lithium (ESKALITH) 450 MG TbSR, Take 450 mg by mouth every 12 (twelve) hours., Disp: , Rfl:     lorazepam (ATIVAN) 0.5 MG tablet, Take 1 tablet (0.5 mg total) by mouth 2 (two) times daily., Disp: 3 tablet, Rfl: 0    meloxicam (MOBIC) 7.5 MG tablet, Take 1 tablet (7.5 mg total) by mouth once daily. Take with food, Disp: 30 tablet, Rfl: 0    nebulizer accessories Kit, 3 neb kits per month for 12 months, Disp: 3 kit, Rfl: 12    traZODone (DESYREL) 100 MG tablet, Take 200 mg by mouth every evening., Disp: , Rfl:   ALLERGIES: Review of patient's allergies indicates:  No Known Allergies      Objective:     VITAL SIGNS: BP (!) 146/98   Pulse 95   Ht 5' 5" (1.651 m)   Wt 96.2 kg (212 lb)   BMI 35.28 kg/m²    General    Vitals reviewed.  Constitutional: She is oriented to person, place, and time. She appears well-developed and well-nourished.   Neurological: She is alert and oriented to person, place, and time.   Psychiatric: She has a normal mood and affect. Her behavior is normal.               MUSCULOSKELETAL EXAM  ANKLE: left ANKLE  The affected ankle is compared to the contralateral ankle.    Observation:    There is edema and ecchymosis at the dorsal distal 3-4th metatarsals as well as the lateral malleolus/calcaneus.  No plantar ecchymosis appreciated  Mild hallux valgus  Normal callus pattern on the feet bilaterally.    Achilles tendon and calcaneal insertion reveals no deformities  No leg or intrinsic foot muscle atrophy.  Left antalgic gait    ROM (* = with pain):  Active dorsiflexion to 20° on left and 20° on right  Active plantarflexion to 50° on left and 50° " on right    Active ankle inversion to 35° on left and 35° on right  Active ankle eversion to 15° on left and 15° on right  Full active flexion/extension of the toes bilaterally.   Heel cords without tightness bilaterally.    Tenderness To Palpation:  Mild tenderness at the ATFL. No tenderness at the  CFL, PTFL, or deltoid ligaments  No tenderness over the distal anterior syndesmosis, distal tibia/fibula, fibular head/shaft  No tenderness at medial or lateral malleoli   + tenderness at the cuboid and lateral cuneiform  +Mild tenderness along the proximal  4th metatarsal  No tenderness at the Achilles tendon calcaneal insertion  No tenderness at the posterior tibial or peroneal tendons    Strength Testing (* = with pain):  Dorsiflexion - 5/5 on left and 5/5 on right  Platarflexion - 5/5 on left and 5/5 on right  Resisted Inversion - 5/5 on left and 5/5 on right  Resisted Eversion - 5/5 on left and 5/5 on right  Great Toe Extension - 5/5 on left and 5/5 on right  Great Toe Flexion - 5/5 on left and 5/5 on right    Special Tests:  Anterior talar drawer - negative and without dimpling  Talar tilt - negative  Reverse Talar tilt - negative    Heel tap test - positive for midfoot pain  Distal tib/fib squeeze test - negative  External rotation stress (Kleiger) test - negative  Murray squeeze test - negative    Metatarsal squeeze test - negative  Midfoot stress test - positive  Calcaneal squeeze test - negative    No subluxation of the peroneal tendons with resisted eversion.    Vascular/Sensory Exam:  DP and PT pulses intact bilaterally  No skin changes, no abnormal hair distribution  Sensation intact to light touch throughout the saphenous, sural, superficial peroneal, deep peroneal, and tibial nerve distributions  Negative Tinel's test over tarsal tunnel  2+/4 reflexes at L4 and S1 dermatomes  Capillary refill intact <2 seconds in all toes bilaterally.    Assessment:      Encounter Diagnoses   Name Primary?    Acute  foot pain, left Yes    Foot sprain, left, initial encounter           Plan:     1.  Acute left foot pain secondary to lateral midfoot sprain and associated mild lateral ankle sprain status post fall 1 month ago. Ankle pain has resolved. Foot pain improved with left Cam walking boot to wear, but persistent left midfoot pain on physical exam. Left foot MRI ordered for further evaluation.   - Continue ice of 20 min at a time and Mobic 7.5 mg p.o. q.day times 1-2 weeks, then as needed for pain control  - Continue doing left ankle range of motion exercises outside of the boot once to twice daily to avoid ankle stiffness  - Continue using a right foot heel lift to prevent uneven gait while walking  -  X-ray images of left foot taken 7/19/19 (AP, lateral, and oblique and bilateral AP weightbearing views) showed no abnormalities. Images were personally reviewed with patient.    2. Follow-up in 2 weeks for reevaluation and review of MRI results    3. Patient agreeable to today's plan and all questions were answered    This note is dictated using the M*Modal Fluency Direct word recognition program. There are word recognition mistakes that are occasionally missed on review.

## 2019-08-05 ENCOUNTER — TELEPHONE (OUTPATIENT)
Dept: ORTHOPEDICS | Facility: CLINIC | Age: 19
End: 2019-08-05

## 2019-08-05 NOTE — TELEPHONE ENCOUNTER
LM with patient to let her know that her insurance will not approve MRI until she has completed 6 weeks of conservative treatment (per [id number] completed by Dr. Antunez 8/5/19). Pt needs to schedule an appointment with Dr. Antunez on 9/6/19 to re-evaluate her pain and possibly re-schedule MRI if she is still having pain.    Yasmin Moss  Clinical Assistant to Dr. Teodora Antunez

## 2019-08-06 ENCOUNTER — TELEPHONE (OUTPATIENT)
Dept: SPORTS MEDICINE | Facility: CLINIC | Age: 19
End: 2019-08-06

## 2019-08-06 NOTE — TELEPHONE ENCOUNTER
Spoke with patient, scheduled appt.    Yasmin Moss  Clinical Assistant to Dr. Teodora Antunez

## 2019-08-06 NOTE — TELEPHONE ENCOUNTER
----- Message from Maryjane Rodriguez sent at 8/6/2019 11:30 AM CDT -----  Contact: self  Called requesting to schedule an appt with Dr. Antunez and could be reached at [phone]

## 2019-09-06 ENCOUNTER — OFFICE VISIT (OUTPATIENT)
Dept: SPORTS MEDICINE | Facility: CLINIC | Age: 19
End: 2019-09-06
Payer: MEDICAID

## 2019-09-06 VITALS
DIASTOLIC BLOOD PRESSURE: 98 MMHG | HEIGHT: 65 IN | BODY MASS INDEX: 35.32 KG/M2 | WEIGHT: 212 LBS | SYSTOLIC BLOOD PRESSURE: 144 MMHG | HEART RATE: 96 BPM

## 2019-09-06 DIAGNOSIS — M79.672 PAIN OF MIDFOOT, LEFT: Primary | ICD-10-CM

## 2019-09-06 DIAGNOSIS — S93.602D FOOT SPRAIN, LEFT, SUBSEQUENT ENCOUNTER: ICD-10-CM

## 2019-09-06 PROCEDURE — 99214 PR OFFICE/OUTPT VISIT, EST, LEVL IV, 30-39 MIN: ICD-10-PCS | Mod: S$PBB,,, | Performed by: NEUROMUSCULOSKELETAL MEDICINE & OMM

## 2019-09-06 PROCEDURE — 99213 OFFICE O/P EST LOW 20 MIN: CPT | Mod: PBBFAC,PO | Performed by: NEUROMUSCULOSKELETAL MEDICINE & OMM

## 2019-09-06 PROCEDURE — 99999 PR PBB SHADOW E&M-EST. PATIENT-LVL III: CPT | Mod: PBBFAC,,, | Performed by: NEUROMUSCULOSKELETAL MEDICINE & OMM

## 2019-09-06 PROCEDURE — 99999 PR PBB SHADOW E&M-EST. PATIENT-LVL III: ICD-10-PCS | Mod: PBBFAC,,, | Performed by: NEUROMUSCULOSKELETAL MEDICINE & OMM

## 2019-09-06 PROCEDURE — 99214 OFFICE O/P EST MOD 30 MIN: CPT | Mod: S$PBB,,, | Performed by: NEUROMUSCULOSKELETAL MEDICINE & OMM

## 2019-09-06 NOTE — PROGRESS NOTES
Subjective:     Sheila Urbina     Chief Complaint   Patient presents with    Left Foot - Follow-up       HPI      Trisha is a 19 y.o. female coming in today for left foot pain. Since last visit the pain has remained unchanged. She feels 65% better on a good day, 42% better on a bad day. Pt notes she is having increased pain in her knee due to walking in the boot. Still having pain in her foot with extended walking and also when her knee is irritated. When she goes to classes she has a lot of walking from her parking spot. The pain is better with rest, boot and worse with end of the day. Pt. describes the pain as a 5/10 dull pain that does not radiate. There has not been any new a fall/injury/ or traumas since last visit.  Pt. denies any new musculoskeletal complaints at this time.     Office note from 8/2/19 reviewed    Review of Systems   Constitutional: Negative for chills and fever.   Musculoskeletal: Positive for joint pain. Negative for back pain, falls, myalgias and neck pain.   Neurological: Negative for dizziness, tingling, focal weakness, weakness and headaches.       PAST MEDICAL HISTORY:   Past Medical History:   Diagnosis Date    Aggressive behavior     post anesthesia    Granuloma of skin     left lower eye lid at age 4    Ovarian cyst 2013    Recurrent dislocation of left patella     Vision abnormalities      PAST SURGICAL HISTORY:   Past Surgical History:   Procedure Laterality Date    ARTHROSCOPY, KNEE Left 8/1/2013    [facility]    ARTHROSCOPY-KNEE Left 12/29/2016    [facility]    ARTHROSCOPY-KNEE Left 3/30/2015    [facility]    ARTHROSCOPY-KNEE W/ CHONDROPLASTY  8/3/2017    [facility]    ARTHROSCOPY-KNEE W/ CHONDROPLASTY Left 12/29/2016    [facility]    ARTHROSCOPY-KNEE, lateral  release Left 8/3/2017    [facility]    EYE SURGERY      granuloma removal under left eye    KNEE ARTHROSCOPY      Osteotomy of tibia      OSTEOTOMY-FEMUR distal arthrex Left 8/3/2017    [facility]    OSTEOTOMY-TIBIA (Tibial tubercle transfer) Left 3/30/2015    [facility]    PATELLA REALIGNMENT      RECONSTRUCTION, LIGAMENT, MEDIAL PATELLOFEMORAL, RIGHT Left 8/1/2013    [facility]    RELEASE-LATERAL  8/3/2017    [facility]    REMOVAL-HARDWARE-LEG Left 12/29/2016    [facility]    REPAIR - MEDIAL PATELLA FEMORAL LIGAMENT Left 3/30/2015    [facility]     FAMILY HISTORY:   Family History   Problem Relation Age of Onset    Diabetes Mother     Seizures Father      SOCIAL HISTORY:   Social History     Socioeconomic History    Marital status: Single     Spouse name: Not on file    Number of children: Not on file    Years of education: Not on file    Highest education level: Not on file   Occupational History    Not on file   Social Needs    Financial resource strain: Not on file    Food insecurity:     Worry: Not on file     Inability: Not on file    Transportation needs:     Medical: Not on file     Non-medical: Not on file   Tobacco Use    Smoking status: Never Smoker    Smokeless tobacco: Never Used   Substance and Sexual Activity    Alcohol use: No    Drug use: No    Sexual activity: Never   Lifestyle    Physical activity:     Days per week: Not on file     Minutes per session: Not on file    Stress: Not on file   Relationships    Social connections:     Talks on phone: Not on file     Gets together: Not on file     Attends Orthodox service: Not on file     Active member of club or organization: Not on file      "Attends meetings of clubs or organizations: Not on file     Relationship status: Not on file   Other Topics Concern    Not on file   Social History Narrative    Not on file       MEDICATIONS:   Current Outpatient Medications:     ESTARYLLA 0.25-35 mg-mcg per tablet, TK 1 T PO QD ATC, Disp: , Rfl: 3    meloxicam (MOBIC) 7.5 MG tablet, Take 1 tablet (7.5 mg total) by mouth once daily. Take with food, Disp: 30 tablet, Rfl: 0    albuterol (PROVENTIL) 2.5 mg /3 mL (0.083 %) nebulizer solution, Take 3 mLs (2.5 mg total) by nebulization every 4 (four) hours as needed., Disp: 60 each, Rfl: 3    diazePAM (VALIUM) 2 MG tablet, TAKE 1 TABLET BY MOUTH EVERY 6 HOURS AS NEEDED FOR MUSCLE SPASMS, Disp: 30 tablet, Rfl: 0    LATUDA 40 mg Tab tablet, 40 mg once daily. , Disp: , Rfl: 1    lithium (ESKALITH) 450 MG TbSR, Take 450 mg by mouth every 12 (twelve) hours., Disp: , Rfl:     lorazepam (ATIVAN) 0.5 MG tablet, Take 1 tablet (0.5 mg total) by mouth 2 (two) times daily., Disp: 3 tablet, Rfl: 0    nebulizer accessories Kit, 3 neb kits per month for 12 months, Disp: 3 kit, Rfl: 12    traZODone (DESYREL) 100 MG tablet, Take 200 mg by mouth every evening., Disp: , Rfl:   ALLERGIES: Review of patient's allergies indicates:  No Known Allergies      Objective:     VITAL SIGNS: BP (!) 144/98   Pulse 96   Ht 5' 5" (1.651 m)   Wt 96.2 kg (212 lb)   BMI 35.28 kg/m²    General    Vitals reviewed.  Constitutional: She is oriented to person, place, and time. She appears well-developed and well-nourished.   Neurological: She is alert and oriented to person, place, and time.   Psychiatric: She has a normal mood and affect. Her behavior is normal.               MUSCULOSKELETAL EXAM  ANKLE: left ANKLE  The affected ankle is compared to the contralateral ankle.    Observation:    There is no edema or ecchymosis appreciated  Mild hallux valgus  Normal callus pattern on the feet bilaterally.    Achilles tendon and calcaneal insertion " reveals no deformities  No leg or intrinsic foot muscle atrophy.  Left antalgic gait    ROM (* = with pain):  Active dorsiflexion to 20° on left and 20° on right  Active plantarflexion to 50° on left and 50° on right    Active ankle inversion to 35° on left and 35° on right  Active ankle eversion to 15° on left and 15° on right  Full active flexion/extension of the toes bilaterally.   Heel cords without tightness bilaterally.    Tenderness To Palpation:  Mild tenderness at the ATFL. No tenderness at the  CFL, PTFL, or deltoid ligaments  No tenderness over the distal anterior syndesmosis, distal tibia/fibula, fibular head/shaft  No tenderness at medial or lateral malleoli   + tenderness at the cuboid and lateral cuneiform  +Mild tenderness at the 5th MT-cuboid joint  No tenderness at the Achilles tendon calcaneal insertion  No tenderness at the posterior tibial or peroneal tendons    Strength Testing (* = with pain):  Dorsiflexion - 5/5 on left and 5/5 on right  Platarflexion - 5/5 on left and 5/5 on right  Resisted Inversion - 5/5 on left and 5/5 on right  Resisted Eversion - 5/5 on left and 5/5 on right  Great Toe Extension - 5/5 on left and 5/5 on right  Great Toe Flexion - 5/5 on left and 5/5 on right    Special Tests:  Anterior talar drawer - negative and without dimpling  Talar tilt - negative  Reverse Talar tilt - negative    Heel tap test - positive for midfoot pain  Distal tib/fib squeeze test - negative  External rotation stress (Kleiger) test - negative  Murray squeeze test - negative    Metatarsal squeeze test - negative  Midfoot stress test - positive  + sheer test at cuboid and medial cuneiform  Calcaneal squeeze test - negative    No subluxation of the peroneal tendons with resisted eversion.    Vascular/Sensory Exam:  DP and PT pulses intact bilaterally  No skin changes, no abnormal hair distribution  Sensation intact to light touch throughout the saphenous, sural, superficial peroneal, deep  peroneal, and tibial nerve distributions  Negative Tinel's test over tarsal tunnel  2+/4 reflexes at L4 and S1 dermatomes  Capillary refill intact <2 seconds in all toes bilaterally.    Assessment:      Encounter Diagnoses   Name Primary?    Pain of midfoot, left Yes    Foot sprain, left, subsequent encounter           Plan:     1.  Acute left foot pain likely secondary to lateral midfoot sprain and associated mild lateral ankle sprain status post fall in early July 2019. Ankle pain has resolved. Foot pain has improved with left Cam walking boot to wear, but persistent left midfoot and cuboid pain on physical exam. Left foot MRI previously ordered but was originally denied by insurance. Due to persistent pain, MRI order replaced for further evaluation.   - Continue ice of 20 min at a time and Mobic 7.5 mg p.o. q.day times 1-2 weeks, then as needed for pain control  - Continue doing left ankle range of motion exercises outside of the boot once to twice daily to avoid ankle stiffness  - Continue using a right foot heel lift to prevent uneven gait while walking  - Temporary handicap sticker form filled out today for patient  -  X-ray images of left foot taken 7/19/19 (AP, lateral, and oblique and bilateral AP weightbearing views) showed no abnormalities. Images were personally reviewed with patient.    2.  Pt. Will be notified by phone in regards to follow-up once MRI results obtained.     3. Patient agreeable to today's plan and all questions were answered    This note is dictated using the M*Modal Fluency Direct word recognition program. There are word recognition mistakes that are occasionally missed on review.

## 2019-09-20 ENCOUNTER — HOSPITAL ENCOUNTER (OUTPATIENT)
Dept: RADIOLOGY | Facility: HOSPITAL | Age: 19
Discharge: HOME | End: 2019-09-20
Attending: NEUROMUSCULOSKELETAL MEDICINE & OMM
Payer: MEDICAID

## 2019-09-20 DIAGNOSIS — S93.602A FOOT SPRAIN, LEFT, INITIAL ENCOUNTER: ICD-10-CM

## 2019-09-20 DIAGNOSIS — M79.672 ACUTE FOOT PAIN, LEFT: ICD-10-CM

## 2019-09-20 PROCEDURE — 73718 MRI LOWER EXTREMITY W/O DYE: CPT | Mod: 26,LT,, | Performed by: RADIOLOGY

## 2019-09-20 PROCEDURE — 73718 MRI LOWER EXTREMITY W/O DYE: CPT | Mod: TC,LT

## 2019-09-20 PROCEDURE — 73718 MRI FOOT (FOREFOOT) LEFT WITHOUT CONTRAST: ICD-10-PCS | Mod: 26,LT,, | Performed by: RADIOLOGY

## 2019-09-23 DIAGNOSIS — S93.602S FOOT SPRAIN, LEFT, SEQUELA: Primary | ICD-10-CM

## 2019-09-23 NOTE — PROGRESS NOTES
Leftt foot MRI images from 9/22/19 personally reviewed, showing a dorsal tarsal metatarsal ligament sprain.  Discussed results with Trisha  over the telephone today. Recommended starting physical therapy for the midfoot sprain and starting to wean out of walking boot.  Referral to [facility] in [place], [place].  Follow-up scheduled in 8 weeks for re-evaluation.  Patient agreeable to above plan and all questions were answered.

## 2019-09-30 ENCOUNTER — PATIENT MESSAGE (OUTPATIENT)
Dept: SPORTS MEDICINE | Facility: CLINIC | Age: 19
End: 2019-09-30

## 2019-09-30 ENCOUNTER — TELEPHONE (OUTPATIENT)
Dept: SPORTS MEDICINE | Facility: CLINIC | Age: 19
End: 2019-09-30

## 2019-09-30 DIAGNOSIS — M79.672 PAIN OF MIDFOOT, LEFT: ICD-10-CM

## 2019-09-30 DIAGNOSIS — S93.602D FOOT SPRAIN, LEFT, SUBSEQUENT ENCOUNTER: Primary | ICD-10-CM

## 2019-09-30 NOTE — TELEPHONE ENCOUNTER
----- Message from Emma Juarez sent at 9/30/2019  2:26 PM CDT -----  Contact: PT  PT is needing a new referral submitted for physical therapy to the [facility].   The previous location won't accept her referral because of her age and insurance.     Callback: [phone]

## 2019-09-30 NOTE — TELEPHONE ENCOUNTER
Pt has been seen at [facility] in the past for PT, but had trouble getting an appt. They are having trouble finding PT with her medicaid insurance. Advised pt call the number on the back of insurance card for a list of approved providers. Pt requests referral to PT at [facility] through [facility] as well as [facility] to see where they could get in more quickly. Referrals sent.     Yasmin Moss  Clinical Assistant to Dr. Teodora Antunez

## 2019-11-06 ENCOUNTER — PATIENT MESSAGE (OUTPATIENT)
Dept: SPORTS MEDICINE | Facility: CLINIC | Age: 19
End: 2019-11-06

## 2019-11-15 DIAGNOSIS — S93.602A FOOT SPRAIN, LEFT, INITIAL ENCOUNTER: ICD-10-CM

## 2019-11-15 DIAGNOSIS — M79.672 ACUTE FOOT PAIN, LEFT: ICD-10-CM

## 2019-11-16 RX ORDER — MELOXICAM 7.5 MG/1
TABLET ORAL
Qty: 30 TABLET | Refills: 0 | OUTPATIENT
Start: 2019-11-16

## 2020-05-28 ENCOUNTER — PATIENT MESSAGE (OUTPATIENT)
Dept: SPORTS MEDICINE | Facility: CLINIC | Age: 20
End: 2020-05-28

## 2020-05-29 ENCOUNTER — OFFICE VISIT (OUTPATIENT)
Dept: URGENT CARE | Facility: CLINIC | Age: 20
End: 2020-05-29
Payer: MEDICAID

## 2020-05-29 VITALS — BODY MASS INDEX: 35.32 KG/M2 | WEIGHT: 212 LBS | HEIGHT: 65 IN

## 2020-05-29 DIAGNOSIS — M25.521 RIGHT ELBOW PAIN: Primary | ICD-10-CM

## 2020-05-29 PROCEDURE — 73080 XR ELBOW COMPLETE 3 VIEW RIGHT: ICD-10-PCS | Mod: RT,S$GLB,, | Performed by: RADIOLOGY

## 2020-05-29 PROCEDURE — 73080 X-RAY EXAM OF ELBOW: CPT | Mod: RT,S$GLB,, | Performed by: RADIOLOGY

## 2020-05-29 PROCEDURE — 99203 OFFICE O/P NEW LOW 30 MIN: CPT | Mod: S$GLB,,, | Performed by: PHYSICIAN ASSISTANT

## 2020-05-29 PROCEDURE — 99203 PR OFFICE/OUTPT VISIT, NEW, LEVL III, 30-44 MIN: ICD-10-PCS | Mod: S$GLB,,, | Performed by: PHYSICIAN ASSISTANT

## 2020-05-29 RX ORDER — DICLOFENAC SODIUM 50 MG/1
50 TABLET, DELAYED RELEASE ORAL 3 TIMES DAILY PRN
Qty: 30 TABLET | Refills: 0 | Status: SHIPPED | OUTPATIENT
Start: 2020-05-29 | End: 2020-08-27

## 2020-05-29 NOTE — PATIENT INSTRUCTIONS
RICE     Rest an injury, elevate it, and use ice and compression as directed.   RICE stands for rest, ice, compression, and elevation. These can limit pain and swelling after an injury. RICE may be recommended to help treat fractures, sprains, strains, and bruises or bumps.   Home care  The following explain the details of RICE:  · Rest. Limit the use of the injured body part. This helps prevent further damage to the body part and gives it time to heal. In some cases, you may need a sling, brace, splint, or cast to help keep the body part still until it has healed.  · Ice. Applying ice right after an injury helps relieve pain and swelling. Wrap a bag of ice in a thin towel. Then, place it over the injured area. Do this for 10 to 15 minutes every 3 to 4 hours. Continue for the next 1 to 3 days or until your symptoms improve. Never put ice directly on your skin or ice an area longer than 15 minutes at a time.  · Compression. Putting pressure on an injury helps reduce swelling and provides support. Wrap the injured area firmly with an elastic bandage/wrap. Make sure not to wrap the bandage too tightly or you will cut off blood flow to the injured area. If your bandage loosens, rewrap it.  · Elevation. Keeping an injury raised above the level of your heart reduces swelling, pain, and throbbing. For instance, if you have a broken leg, it may help to rest your leg on several pillows when sitting or lying down. Try to keep the injured area elevated for at least 2 to 3 hours per day.  Follow-up care  Follow up with your healthcare provider, or as advised.  When to seek medical advice  Call your healthcare provider right away if any of these occur:  · Fever of 100.4°F (38°C) or higher, or as directed by your healthcare provider  · Increased pain or swelling in the injured body part  · Injured body part becomes cold, blue, numb, or tingly  · Signs of infection. These include warmth in the skin, redness, drainage, or bad  smell coming from the injured body part.  Date Last Reviewed: 1/18/2016  © 7776-1832 [employer]. [address] [place] [address], [place] [address]. All rights reserved. This information is not intended as a substitute for professional medical care. Always follow your healthcare professional's instructions.

## 2020-05-29 NOTE — PROGRESS NOTES
"Subjective:       Patient ID: Sheila Urbina is a 19 y.o. female.    Vitals:  height is 5' 5" (1.651 m) and weight is 96.2 kg (212 lb).     Chief Complaint: Elbow Injury    Patient reports history of chronic joint pain. She states that usually feels more like a "crackle." she reports that she felt a more intense "grinding" pain that started 3 days ago with straightening her right elbow. Denies trauma or injury, fever, chills, redness, warmth, numbness or weakness. Treated with ibuprofen and tylenol with no relief.    Elbow Injury   This is a new problem. The current episode started in the past 7 days (3 days). The problem occurs constantly. The problem has been gradually worsening. Associated symptoms include arthralgias. Pertinent negatives include no chest pain, chills, congestion, coughing, fatigue, fever, headaches, joint swelling, myalgias, nausea, rash, sore throat, vertigo, vomiting or weakness. Associated symptoms comments: stiffness. The symptoms are aggravated by bending. She has tried acetaminophen for the symptoms. The treatment provided mild relief.       Constitution: Negative for chills, fatigue and fever.   HENT: Negative for congestion and sore throat.    Neck: Negative for painful lymph nodes.   Cardiovascular: Negative for chest pain and leg swelling.   Eyes: Negative for double vision and blurred vision.   Respiratory: Negative for cough and shortness of breath.    Gastrointestinal: Negative for nausea, vomiting and diarrhea.   Genitourinary: Negative for dysuria, frequency, urgency and history of kidney stones.   Musculoskeletal: Positive for pain and joint pain. Negative for trauma, joint swelling, muscle cramps and muscle ache.   Skin: Negative for color change, pale, rash, erythema and bruising.   Allergic/Immunologic: Negative for seasonal allergies.   Neurological: Negative for dizziness, history of vertigo, light-headedness, passing out and headaches.   Hematologic/Lymphatic: " Negative for swollen lymph nodes.   Psychiatric/Behavioral: Negative for nervous/anxious, sleep disturbance and depression. The patient is not nervous/anxious.        Objective:      Physical Exam   Constitutional: She is oriented to person, place, and time. Vital signs are normal. She appears well-developed and well-nourished. She is active and cooperative. No distress.   HENT:   Head: Normocephalic and atraumatic.   Nose: Nose normal.   Mouth/Throat: Oropharynx is clear and moist and mucous membranes are normal.   Eyes: Conjunctivae and lids are normal.   Neck: Trachea normal, normal range of motion, full passive range of motion without pain and phonation normal. Neck supple.   Cardiovascular: Normal rate, regular rhythm, intact distal pulses and normal pulses.   Pulmonary/Chest: Effort normal. No respiratory distress.   Abdominal: Normal appearance. She exhibits no abdominal bruit and no pulsatile midline mass.   Musculoskeletal: She exhibits no edema or deformity.   Right elbow- pain with extension of the elbow. No pain with pronation or supination.  strength 5/5. Intact distal pulses with no sensory deficits. Capillary refill less than 3 seconds. No bony deformity or bony TTP. No erythema, warmth, edema, ecchymosis, lymphangitis.    Neurological: She is alert and oriented to person, place, and time. She has normal strength and normal reflexes. No sensory deficit. She exhibits normal muscle tone.   Skin: Skin is warm, dry, intact, not diaphoretic and no rash. abrasion, burn, bruising, erythema, ecchymosis, lesion and petechiae  Psychiatric: She has a normal mood and affect. Her speech is normal and behavior is normal. Judgment and thought content normal. Cognition and memory are normal.   Nursing note and vitals reviewed.        Assessment:       1. Right elbow pain        Plan:         Right elbow pain  -     XR ELBOW COMPLETE 3 VIEW RIGHT; Future; Expected date: 05/29/2020  -     diclofenac (VOLTAREN) 50  MG EC tablet; Take 1 tablet (50 mg total) by mouth 3 (three) times daily as needed (pain).  Dispense: 30 tablet; Refill: 0  -     Ambulatory referral/consult to Orthopedics    Xr Elbow Complete 3 View Right    Result Date: 5/29/2020  EXAMINATION: XR ELBOW COMPLETE 3 VIEW RIGHT CLINICAL HISTORY: . Pain in right elbow TECHNIQUE: AP, lateral, and radial head views of the right elbow were performed. COMPARISON: No relevant prior imaging for comparison. FINDINGS: No fracture or dislocation. No fat pad elevation. Cartilage spaces are maintained. Soft tissues are unremarkable.     No acute abnormality of the right elbow. Electronically signed by resident: Wilver Arreola Date:    05/29/2020 Time:    15:28 Electronically signed by: Stepan Swann MD Date:    05/29/2020 Time:    15:40    Imaging reviewed by me in PACS    Patient Instructions       RICE     Rest an injury, elevate it, and use ice and compression as directed.   RICE stands for rest, ice, compression, and elevation. These can limit pain and swelling after an injury. RICE may be recommended to help treat fractures, sprains, strains, and bruises or bumps.   Home care  The following explain the details of RICE:  · Rest. Limit the use of the injured body part. This helps prevent further damage to the body part and gives it time to heal. In some cases, you may need a sling, brace, splint, or cast to help keep the body part still until it has healed.  · Ice. Applying ice right after an injury helps relieve pain and swelling. Wrap a bag of ice in a thin towel. Then, place it over the injured area. Do this for 10 to 15 minutes every 3 to 4 hours. Continue for the next 1 to 3 days or until your symptoms improve. Never put ice directly on your skin or ice an area longer than 15 minutes at a time.  · Compression. Putting pressure on an injury helps reduce swelling and provides support. Wrap the injured area firmly with an elastic bandage/wrap. Make sure not to wrap the bandage  too tightly or you will cut off blood flow to the injured area. If your bandage loosens, rewrap it.  · Elevation. Keeping an injury raised above the level of your heart reduces swelling, pain, and throbbing. For instance, if you have a broken leg, it may help to rest your leg on several pillows when sitting or lying down. Try to keep the injured area elevated for at least 2 to 3 hours per day.  Follow-up care  Follow up with your healthcare provider, or as advised.  When to seek medical advice  Call your healthcare provider right away if any of these occur:  · Fever of 100.4°F (38°C) or higher, or as directed by your healthcare provider  · Increased pain or swelling in the injured body part  · Injured body part becomes cold, blue, numb, or tingly  · Signs of infection. These include warmth in the skin, redness, drainage, or bad smell coming from the injured body part.  Date Last Reviewed: 1/18/2016  © 3689-1715 [employer]. [address] [place] [address], [place] [address]. All rights reserved. This information is not intended as a substitute for professional medical care. Always follow your healthcare professional's instructions.

## 2020-07-16 ENCOUNTER — TELEPHONE (OUTPATIENT)
Dept: SPORTS MEDICINE | Facility: CLINIC | Age: 20
End: 2020-07-16

## 2020-07-16 NOTE — TELEPHONE ENCOUNTER
Spoke to patient and she stated that she will call back to schedule an appointment once she speaks to her mother about the appointment time.

## 2020-07-16 NOTE — TELEPHONE ENCOUNTER
----- Message from Fred Mccoy sent at 7/16/2020 12:41 PM CDT -----  Contact: self  Mg  Pt would like someone to return her call  concerning pain mg next options would be    Contact  [phone]

## 2020-08-26 DIAGNOSIS — M25.562 LEFT KNEE PAIN, UNSPECIFIED CHRONICITY: Primary | ICD-10-CM

## 2020-08-27 ENCOUNTER — HOSPITAL ENCOUNTER (OUTPATIENT)
Dept: RADIOLOGY | Facility: HOSPITAL | Age: 20
Discharge: HOME | End: 2020-08-27
Attending: PHYSICIAN ASSISTANT
Payer: MEDICAID

## 2020-08-27 ENCOUNTER — TELEPHONE (OUTPATIENT)
Dept: ORTHOPEDICS | Facility: CLINIC | Age: 20
End: 2020-08-27

## 2020-08-27 ENCOUNTER — OFFICE VISIT (OUTPATIENT)
Dept: ORTHOPEDICS | Facility: CLINIC | Age: 20
End: 2020-08-27
Payer: MEDICAID

## 2020-08-27 VITALS
HEIGHT: 65 IN | DIASTOLIC BLOOD PRESSURE: 84 MMHG | TEMPERATURE: 97 F | HEART RATE: 84 BPM | SYSTOLIC BLOOD PRESSURE: 134 MMHG | WEIGHT: 207.81 LBS | BODY MASS INDEX: 34.62 KG/M2 | RESPIRATION RATE: 18 BRPM

## 2020-08-27 DIAGNOSIS — M25.562 LEFT KNEE PAIN, UNSPECIFIED CHRONICITY: ICD-10-CM

## 2020-08-27 DIAGNOSIS — M25.562 CHRONIC PAIN OF LEFT KNEE: Primary | ICD-10-CM

## 2020-08-27 DIAGNOSIS — G89.29 CHRONIC PAIN OF LEFT KNEE: Primary | ICD-10-CM

## 2020-08-27 PROCEDURE — 73562 XR KNEE ORTHO LEFT WITH FLEXION: ICD-10-PCS | Mod: 26,RT,, | Performed by: RADIOLOGY

## 2020-08-27 PROCEDURE — 73562 X-RAY EXAM OF KNEE 3: CPT | Mod: TC,RT

## 2020-08-27 PROCEDURE — 99999 PR PBB SHADOW E&M-EST. PATIENT-LVL III: CPT | Mod: PBBFAC,,, | Performed by: PHYSICIAN ASSISTANT

## 2020-08-27 PROCEDURE — 99213 PR OFFICE/OUTPT VISIT, EST, LEVL III, 20-29 MIN: ICD-10-PCS | Mod: S$PBB,,, | Performed by: PHYSICIAN ASSISTANT

## 2020-08-27 PROCEDURE — 73562 X-RAY EXAM OF KNEE 3: CPT | Mod: 26,RT,, | Performed by: RADIOLOGY

## 2020-08-27 PROCEDURE — 73564 XR KNEE ORTHO LEFT WITH FLEXION: ICD-10-PCS | Mod: 26,LT,, | Performed by: RADIOLOGY

## 2020-08-27 PROCEDURE — 99213 OFFICE O/P EST LOW 20 MIN: CPT | Mod: PBBFAC,25 | Performed by: PHYSICIAN ASSISTANT

## 2020-08-27 PROCEDURE — 99213 OFFICE O/P EST LOW 20 MIN: CPT | Mod: S$PBB,,, | Performed by: PHYSICIAN ASSISTANT

## 2020-08-27 PROCEDURE — 99999 PR PBB SHADOW E&M-EST. PATIENT-LVL III: ICD-10-PCS | Mod: PBBFAC,,, | Performed by: PHYSICIAN ASSISTANT

## 2020-08-27 PROCEDURE — 73564 X-RAY EXAM KNEE 4 OR MORE: CPT | Mod: 26,LT,, | Performed by: RADIOLOGY

## 2020-08-27 RX ORDER — BUTALBITAL, ACETAMINOPHEN AND CAFFEINE 50; 325; 40 MG/1; MG/1; MG/1
TABLET ORAL
COMMUNITY
End: 2020-08-27

## 2020-08-27 RX ORDER — NORGESTIMATE AND ETHINYL ESTRADIOL 0.25-0.035
KIT ORAL
COMMUNITY
End: 2020-08-27 | Stop reason: SDUPTHER

## 2020-08-27 NOTE — PROGRESS NOTES
Subjective:      Patient ID: Sheila Urbina is a 20 y.o. female.    Chief Complaint: Pain of the Left Knee    Review of patient's allergies indicates:  No Known Allergies   19 yo F presents to clinic with h/o left knee pain x years.    She has complex surgical history as listed below.   8/2013 MPFL reconstruction  2/2015 Jasper osteotomy  8/2017 distal femur varus-producing osteotomy.  Knee scope. 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  Pain worse with standing, walking, and after sitting too long.  Better after applying ice packs, states that this is the only thing that she has tried that provides any relief.    She has previously tried physical therapy with no relief of symptoms.  She reports that she several series of Euflexxa injections which seemed to provide less relief each time. Last injections in May 2019.  Has also tried Mobic with no improvement.  She is asking about any surgical options, states that knee pain is affecting ADLs and preventing her from career path as .    Denies any new injury or trauma.    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         Review of Systems   Constitution: Negative for chills, diaphoresis and fever.   HENT: Negative for congestion, ear discharge and ear pain.    Eyes: Negative for blurred vision, discharge, double vision and pain.   Cardiovascular: Negative for chest pain, claudication and cyanosis.   Respiratory: Negative for cough, hemoptysis and shortness of breath.    Endocrine: Negative for cold intolerance and heat intolerance.   Skin: Negative for color change, dry skin, itching and rash.   Musculoskeletal: Negative for arthritis, back pain, falls, gout, joint pain, joint swelling, muscle weakness and neck pain.   Gastrointestinal: Negative for abdominal pain and change in bowel habit.   Neurological: Negative for brief paralysis, disturbances in coordination and dizziness.    Psychiatric/Behavioral: Negative for altered mental status and depression.         Objective:          General    Constitutional: She is oriented to person, place, and time. She appears well-developed and well-nourished. No distress.   HENT:   Head: Atraumatic.   Eyes: EOM are normal. Right eye exhibits no discharge. Left eye exhibits no discharge.   Cardiovascular: Normal rate.    Pulmonary/Chest: Effort normal. No respiratory distress.   Abdominal: Soft.   Neurological: She is alert and oriented to person, place, and time.   Psychiatric: She has a normal mood and affect. Her behavior is normal.           Right Knee Exam     Inspection   Erythema: absent  Scars: absent  Swelling: absent  Effusion: absent  Deformity: absent  Bruising: absent    Range of Motion   Extension: 0   Right knee flexion: 145.     Tests   Ligament Examination Lachman: normal (-1 to 2mm) PCL-Posterior Drawer: normal (0 to 2mm)     MCL - Valgus: normal (0 to 2mm)  LCL - Varus: normal  Patella   Patellar Grind: negative  J-Sign: none    Other   Sensation: normal    Left Knee Exam     Inspection   Erythema: absent  Scars: present  Swelling: absent  Effusion: absent  Deformity: absent  Bruising: absent    Tenderness   The patient tender to palpation of the patella, patellar tendon, lateral joint line and medial joint line.    Crepitus   The patient has crepitus of the patella.    Range of Motion   Extension: 0   Left knee flexion: 145.     Tests   Stability Lachman: normal (-1 to 2mm) PCL-Posterior Drawer: normal (0 to 2mm)  MCL - Valgus: normal (0 to 2mm)  LCL - Varus: normal (0 to 2mm)  Patella   Patellar Grind: negative  J-Sign: J sign absent    Other   Sensation: normal    Muscle Strength   Right Lower Extremity   Hip Abduction: 5/5   Quadriceps:  5/5   Hamstrin/5   Left Lower Extremity   Hip Abduction: 5/5   Quadriceps:  5/5   Hamstrin/5     Vascular Exam     Right Pulses  Dorsalis Pedis:      2+          Left Pulses  Dorsalis  Pedis:      2+          Edema  Right Lower Leg: absent  Left Lower Leg: absent              Assessment:         Xray Left Knee 8/27/20:  Postoperative changes of the left distal femur are identified with surgical screws also seen in within the proximal tibia no hardware complication is appreciated.  No fracture or subluxation.  Mild degenerative changes of the patellofemoral compartment on the left.  No joint effusions    MRI Left Knee 9/28/18:  Bone marrow edema in the inferomedial patella and superomedial trochlea with edema of Hoffa's fat pad may reflect patellar maltracking and possibly osseous contusion.    Encounter Diagnosis   Name Primary?    Chronic pain of left knee Yes    Chronic pain of left knee               Plan:         Patient states that she would not like to try any more medications, therapy, or injections since they have not worked in the past. She is interested in pain management or additional surgery. She would like to discuss any surgical options. She is previous patient of Dr. Vazquez and would like to be seen by him again.  She also states that she needs to set up PCP and is asking if there is one here with whom she can establish care.    1. Referral to Dr. Vazquez for further evaluation.  2. Appointment made with Dr. Jaffe to establish care as PCP.  3. Continue icing knee as directed.  3. Return to clinic as needed.    Patient voices understanding of and agreement with treatment plan. All of the patient's and her mother's questions were answered and the patient will contact us if she has any questions or concerns in the interim.

## 2020-08-27 NOTE — TELEPHONE ENCOUNTER
Pt seen Dr Vazquez in the past. MARY BAIG requesting pt to be seen by him again. Please schedule pt.

## 2020-09-08 ENCOUNTER — TELEPHONE (OUTPATIENT)
Dept: ORTHOPEDICS | Facility: CLINIC | Age: 20
End: 2020-09-08

## 2020-09-08 ENCOUNTER — TELEPHONE (OUTPATIENT)
Dept: SPORTS MEDICINE | Facility: CLINIC | Age: 20
End: 2020-09-08

## 2020-09-08 NOTE — TELEPHONE ENCOUNTER
----- Message from Mary Brady sent at 9/8/2020  3:08 PM CDT -----  Contact: pt mother  Please call pt mother at [phone]    Patient mother is returning staff call regarding appt change (can not make early morning appts)    Thank you

## 2020-09-18 ENCOUNTER — OFFICE VISIT (OUTPATIENT)
Dept: INTERNAL MEDICINE | Facility: CLINIC | Age: 20
End: 2020-09-18
Payer: MEDICAID

## 2020-09-18 ENCOUNTER — PATIENT MESSAGE (OUTPATIENT)
Dept: INTERNAL MEDICINE | Facility: CLINIC | Age: 20
End: 2020-09-18

## 2020-09-18 VITALS
RESPIRATION RATE: 17 BRPM | SYSTOLIC BLOOD PRESSURE: 124 MMHG | DIASTOLIC BLOOD PRESSURE: 90 MMHG | BODY MASS INDEX: 34.75 KG/M2 | WEIGHT: 208.56 LBS | HEIGHT: 65 IN | HEART RATE: 90 BPM

## 2020-09-18 DIAGNOSIS — F41.1 GAD (GENERALIZED ANXIETY DISORDER): ICD-10-CM

## 2020-09-18 DIAGNOSIS — J45.40 MODERATE PERSISTENT ASTHMA WITHOUT COMPLICATION: Primary | ICD-10-CM

## 2020-09-18 DIAGNOSIS — F31.31 BIPOLAR AFFECTIVE DISORDER, CURRENTLY DEPRESSED, MILD: ICD-10-CM

## 2020-09-18 DIAGNOSIS — H61.23 BILATERAL IMPACTED CERUMEN: ICD-10-CM

## 2020-09-18 PROBLEM — M65.4 DE QUERVAIN'S TENOSYNOVITIS, RIGHT: Status: RESOLVED | Noted: 2019-03-06 | Resolved: 2020-09-18

## 2020-09-18 PROBLEM — E66.9 OBESITY: Status: ACTIVE | Noted: 2018-05-07

## 2020-09-18 PROBLEM — J45.20 MILD INTERMITTENT ASTHMA WITHOUT COMPLICATION: Status: ACTIVE | Noted: 2020-09-18

## 2020-09-18 PROCEDURE — 99214 PR OFFICE/OUTPT VISIT, EST, LEVL IV, 30-39 MIN: ICD-10-PCS | Mod: S$PBB,,, | Performed by: NURSE PRACTITIONER

## 2020-09-18 PROCEDURE — 99999 PR PBB SHADOW E&M-EST. PATIENT-LVL IV: CPT | Mod: PBBFAC,,, | Performed by: NURSE PRACTITIONER

## 2020-09-18 PROCEDURE — 99999 PR PBB SHADOW E&M-EST. PATIENT-LVL IV: ICD-10-PCS | Mod: PBBFAC,,, | Performed by: NURSE PRACTITIONER

## 2020-09-18 PROCEDURE — 99214 OFFICE O/P EST MOD 30 MIN: CPT | Mod: PBBFAC,PN | Performed by: NURSE PRACTITIONER

## 2020-09-18 PROCEDURE — 99214 OFFICE O/P EST MOD 30 MIN: CPT | Mod: S$PBB,,, | Performed by: NURSE PRACTITIONER

## 2020-09-18 RX ORDER — OLANZAPINE 5 MG/1
10 TABLET, ORALLY DISINTEGRATING ORAL NIGHTLY
Qty: 60 TABLET | Refills: 5 | Status: SHIPPED | OUTPATIENT
Start: 2020-09-18 | End: 2021-01-06

## 2020-09-18 RX ORDER — ALBUTEROL SULFATE 90 UG/1
1-2 AEROSOL, METERED RESPIRATORY (INHALATION)
Qty: 18 G | Refills: 2 | Status: SHIPPED | OUTPATIENT
Start: 2020-09-18 | End: 2020-10-30

## 2020-09-18 RX ORDER — ASPIRIN 81 MG
5 TABLET, DELAYED RELEASE (ENTERIC COATED) ORAL 2 TIMES DAILY
Qty: 30 ML | Refills: 2 | Status: SHIPPED | OUTPATIENT
Start: 2020-09-18 | End: 2022-04-01

## 2020-09-18 RX ORDER — ALBUTEROL SULFATE 0.83 MG/ML
2.5 SOLUTION RESPIRATORY (INHALATION) EVERY 6 HOURS PRN
Qty: 1 BOX | Refills: 2 | Status: SHIPPED | OUTPATIENT
Start: 2020-09-18 | End: 2021-09-18

## 2020-09-18 RX ORDER — FLUOXETINE HYDROCHLORIDE 40 MG/1
40 CAPSULE ORAL DAILY
Qty: 30 CAPSULE | Refills: 5 | Status: SHIPPED | OUTPATIENT
Start: 2020-09-18 | End: 2021-01-06

## 2020-09-18 NOTE — LETTER
September 19, 2020      Kell Neff PA-C  [address]  [facility] [id number]           [facility]  [address] [place] [id number]  Phone: [phone]  Fax: [phone]          Patient: Sheila Urbina   MR Number: [id number]   YOB: 2000   Date of Visit: 9/18/2020       Dear Kell Neff:    Thank you for referring Sheila Urbina to me for evaluation. Attached you will find relevant portions of my assessment and plan of care.    If you have questions, please do not hesitate to call me. I look forward to following Sheila Urbina along with you.    Sincerely,    Marizol Caldwell, NP    Enclosure  CC:  No Recipients    If you would like to receive this communication electronically, please contact [email or web address] or [phone] to request more information on [employer] access.    For providers and/or their staff who would like to refer a patient to [facility], please contact us through our one-stop-shop provider referral line, [facility], at [phone].    If you feel you have received this communication in error or would no longer like to receive these types of communications, please e-mail [email or web address]

## 2020-09-18 NOTE — PROGRESS NOTES
Subjective:       Patient ID: Sheila Urbina is a 20 y.o. female.    Chief Complaint: Establish Care, Asthma, Cough, and Spasms (back )    Pt new to [facility]. Presents to establish care. Pt reports hx of asthma, chronic constipation, recurrent back spasms and anxiety, bipolar, with OCD. Was  Previously under the care of [facility] mental health.  Most recently off of medication for ~1yr due to inability to follow up. She sees  for her knee and reports asthma is stable.     Asthma  She complains of chest tightness, cough, difficulty breathing, frequent throat clearing, shortness of breath and wheezing. There is no hemoptysis, hoarse voice or sputum production. This is a chronic problem. Episode onset: no acute symptoms today, last episode 3 weeks ago. Asthma causes daytime symptoms weekly. Asthma causes nighttime symptoms weekly. The problem has been waxing and waning. The cough is non-productive, barking, hacking and harsh. Associated symptoms include postnasal drip. Pertinent negatives include no appetite change, chest pain, dyspnea on exertion, ear congestion, ear pain, fever, headaches, heartburn, malaise/fatigue, myalgias, nasal congestion, orthopnea, PND, rhinorrhea, sneezing, sore throat, sweats, trouble swallowing or weight loss. Her symptoms are aggravated by pollen, minimal activity, emotional stress, URI, lying down and change in weather. Her symptoms are alleviated by beta-agonist. She reports moderate improvement on treatment. There are no known risk factors for lung disease. Her past medical history is significant for asthma and bronchitis. There is no history of bronchiectasis, COPD, emphysema or pneumonia.   Spasms  This is a recurrent problem. The current episode started 1 to 4 weeks ago. The problem occurs every several days. The problem has been unchanged. Associated symptoms include arthralgias, coughing and joint swelling. Pertinent negatives include no abdominal pain,  anorexia, change in bowel habit (chronic constipation), chest pain, chills, congestion, diaphoresis, fatigue, fever, headaches, myalgias, nausea, neck pain, numbness, rash, sore throat, swollen glands, urinary symptoms, vertigo, visual change, vomiting or weakness. Exacerbated by: movement and activity. She has tried position changes and relaxation for the symptoms. The treatment provided mild relief.   Anxiety  Presents for initial visit. Onset was more than 5 years ago. The problem has been gradually worsening. Symptoms include decreased concentration, depressed mood, excessive worry, insomnia, irritability, malaise, nervous/anxious behavior, obsessions, panic, restlessness and shortness of breath. Patient reports no chest pain, compulsions, confusion, dizziness, dry mouth, feeling of choking, hyperventilation, impotence, muscle tension, nausea, palpitations or suicidal ideas. Symptoms occur constantly. The severity of symptoms is interfering with daily activities, incapacitating, causing significant distress and severe. The symptoms are aggravated by social activities and specific phobias. The quality of sleep is poor. Nighttime awakenings: occasional.     There are no known risk factors. Her past medical history is significant for anxiety/panic attacks, asthma, bipolar disorder and depression. There is no history of anemia, arrhythmia, CAD, CHF, chronic lung disease, fibromyalgia, hyperthyroidism or suicide attempts. Past treatments include counseling (CBT), herbal remedies, lifestyle changes, non-benzodiazephine anxiolytics, SSRIs and non-SSRI antidepressants (last on lithium and latuda). The treatment provided moderate relief. Compliance with prior treatments has been variable. Prior compliance problems include medication issues, pharmacy issues and medical issues.     Review of Systems   Constitutional: Positive for irritability. Negative for activity change, appetite change, chills, diaphoresis, fatigue,  fever, malaise/fatigue, unexpected weight change and weight loss.   HENT: Positive for postnasal drip. Negative for nasal congestion, ear pain, hoarse voice, rhinorrhea, sneezing, sore throat and trouble swallowing.    Respiratory: Positive for cough, shortness of breath and wheezing. Negative for hemoptysis, sputum production and chest tightness.    Cardiovascular: Negative for chest pain, dyspnea on exertion, palpitations and PND.   Gastrointestinal: Positive for constipation (firm BM every 2-3 days). Negative for abdominal distention, abdominal pain, anal bleeding, anorexia, blood in stool, change in bowel habit (chronic constipation), diarrhea, heartburn, nausea, rectal pain, vomiting, reflux, fecal incontinence and change in bowel habit (chronic constipation).   Genitourinary: Negative for difficulty urinating, dysuria, impotence and menstrual problem.   Musculoskeletal: Positive for arthralgias, gait problem, joint swelling, neck stiffness and joint deformity. Negative for back pain, leg pain, myalgias and neck pain.        Left knee   Integumentary:  Negative for rash.   Neurological: Negative for dizziness, vertigo, weakness, numbness and headaches.   Psychiatric/Behavioral: Positive for agitation, behavioral problems, decreased concentration, dysphoric mood and sleep disturbance. Negative for confusion, hallucinations, self-injury and suicidal ideas. The patient is nervous/anxious and has insomnia. The patient is not hyperactive.    All other systems reviewed and are negative.        Objective:      Physical Exam  Vitals signs and nursing note reviewed.   Constitutional:       General: She is not in acute distress.     Appearance: Normal appearance. She is well-developed, well-groomed and overweight.   HENT:      Head: Normocephalic and atraumatic.      Right Ear: External ear normal. There is impacted cerumen.      Left Ear: External ear normal. There is impacted cerumen.      Nose: Nose normal.       Mouth/Throat:      Lips: Pink.      Mouth: Mucous membranes are moist.   Eyes:      General: Lids are normal.      Conjunctiva/sclera: Conjunctivae normal.      Pupils: Pupils are equal, round, and reactive to light.   Neck:      Musculoskeletal: Normal range of motion and neck supple.      Trachea: Trachea and phonation normal.   Cardiovascular:      Rate and Rhythm: Normal rate and regular rhythm.      Pulses: Normal pulses.      Heart sounds: Normal heart sounds.   Pulmonary:      Effort: Pulmonary effort is normal. No respiratory distress.      Breath sounds: Normal breath sounds and air entry.   Abdominal:      General: Abdomen is flat. Bowel sounds are normal.      Palpations: Abdomen is soft.      Tenderness: There is no abdominal tenderness.   Musculoskeletal:      Right knee: Normal.      Left knee: She exhibits decreased range of motion and deformity (multiple scars). She exhibits no swelling and no erythema. No tenderness found.      Cervical back: She exhibits decreased range of motion and spasm. She exhibits no tenderness, no bony tenderness, no swelling, no edema, no deformity, no laceration, no pain and normal pulse.      Right lower leg: No edema.      Left lower leg: No edema.   Skin:     General: Skin is warm and dry.      Findings: No rash.   Neurological:      General: No focal deficit present.      Mental Status: She is alert and oriented to person, place, and time. Mental status is at baseline.      Gait: Gait abnormal (antalgic).   Psychiatric:         Attention and Perception: Attention and perception normal.         Mood and Affect: Affect normal. Mood is anxious. Affect is not inappropriate.         Speech: Speech is tangential.         Behavior: Behavior normal. Behavior is cooperative.         Thought Content: Thought content normal. Thought content does not include homicidal or suicidal ideation. Thought content does not include homicidal or suicidal plan.         Cognition and Memory:  Cognition and memory normal.         Judgment: Judgment normal.         Assessment:       1. Moderate persistent asthma without complication    2. Bipolar affective disorder, currently depressed, mild    3. JAN (generalized anxiety disorder)    4. Bilateral impacted cerumen        Plan:       1. Moderate persistent asthma without complication  Review treatment goals of symptom prevention, minimizing limitation in activity, prevention of exacerbations and use of ER/inpatient care, maintenance of optimal pulmonary function and minimization of adverse effects of treatment.  Medications: begin Stilito daily.  Discussed distinction between quick-relief and controlled medications.  Discussed medication dosage, use, side effects, and goals of treatment in detail.    Warning signs of respiratory distress were reviewed with the patient.   Reduce exposure to inhaled allergens: use impermeable mattress and pillow covers, wash bedding weekly in water > 130'F to kill dust mites, vacuum 2x/week (the patient should not do the vacuuming), remove carpets in bedroom, remove carpets overlying concrete, avoid lying on upholstered furniture, wash stuffed animals regularly if kept in bed, don't use humidifiers (may increased dust & mold), keep pets out of bedroom with bedroom door closed, keep pets off furniture and wash pet weekly.  Discussed avoidance of precipitants.  Discussed pathophysiology of asthma.  Discussed technique for using MDIs and/or nebulizer.  Discussed monitoring symptoms and use of quick-relief medications and contacting us early in the course of exacerbations.  Additional suggestions to patient: use acetaminophen, ibuprofen, antihistamine-decongestant of choice, cough suppressant of choice prn, push fluids, rest, apply heat to sinuses for pain, use a vaporizer prn, counseled to quit smoking, use SVN machine q4h prn and return if not improved or if worse.  - albuterol (PROVENTIL/VENTOLIN HFA) 90 mcg/actuation inhaler;  Inhale 1-2 puffs into the lungs every 4 to 6 hours as needed for Wheezing. Rescue  Dispense: 18 g; Refill: 2  - albuterol (PROVENTIL) 2.5 mg /3 mL (0.083 %) nebulizer solution; Take 3 mLs (2.5 mg total) by nebulization every 6 (six) hours as needed for Wheezing. Rescue  Dispense: 1 Box; Refill: 2  - tiotropium-olodateroL (STIOLTO RESPIMAT) 2.5-2.5 mcg/actuation Mist; Inhale 1 puff into the lungs once daily. Controller  Dispense: 4 g; Refill: 5    2. Bipolar affective disorder, currently depressed, mild  Medications: Prozac and Zyprexa.  Continue with counseling.  Recommended counseling.  List of counselors provided.  Recommended transfer to psychiatry for medical management.  List of Psychiatrists recommended.  Reviewed concept of anxiety as biochemical imbalance of neurotransmitters and rationale for treatment.  Instructed patient to contact office or on-call physician promptly should condition worsen or any new symptoms appear and provided on-call telephone numbers. IF THE PATIENT HAS ANY SUICIDAL OR HOMICIDAL IDEATIONS, CALL THE OFFICE, DISCUSS WITH A SUPPORT MEMBER, OR GO TO THE ER IMMEDIATELY. Patient was agreeable with this plan.  Follow up: a few weeks.  Spent 25 minutes (>50% of visit) discussing the risks of anxiety disorder, bipolar disorder, obsessive compulsive disorder, panic attacks, post traumatic stress  disorder, sleep disturbance and depression, the  pathophysiology, etiology, risks, and principles of treatment.  - Ambulatory referral/consult to Psychology; Future  - FLUoxetine 40 MG capsule; Take 1 capsule (40 mg total) by mouth once daily.  Dispense: 30 capsule; Refill: 5  - olanzapine zydis (ZYPREXA) 5 MG TbDL; Take 2 tablets (10 mg total) by mouth every evening.  Dispense: 60 tablet; Refill: 5    3. JAN (generalized anxiety disorder)  Same as above  - Ambulatory referral/consult to Psychology; Future  - FLUoxetine 40 MG capsule; Take 1 capsule (40 mg total) by mouth once daily.  Dispense: 30  capsule; Refill: 5  - olanzapine zydis (ZYPREXA) 5 MG TbDL; Take 2 tablets (10 mg total) by mouth every evening.  Dispense: 60 tablet; Refill: 5    4. Bilateral impacted cerumen  Care instructions given.  Home treatment: Debrox.  Follow-up as needed.  - carbamide peroxide (DEBROX) 6.5 % otic solution; Place 5 drops into both ears 2 (two) times daily.  Dispense: 30 mL; Refill: 2        ROSALVA Chiu, FNP-C  Family/Internal Medicine  [facility]

## 2020-09-19 PROBLEM — J45.909 MODERATE ASTHMA: Status: ACTIVE | Noted: 2020-09-18

## 2020-10-05 ENCOUNTER — PATIENT MESSAGE (OUTPATIENT)
Dept: ADMINISTRATIVE | Facility: HOSPITAL | Age: 20
End: 2020-10-05

## 2020-12-03 ENCOUNTER — TELEPHONE (OUTPATIENT)
Dept: ORTHOPEDICS | Facility: CLINIC | Age: 20
End: 2020-12-03

## 2020-12-03 DIAGNOSIS — G89.29 CHRONIC PAIN OF LEFT KNEE: Primary | ICD-10-CM

## 2020-12-03 DIAGNOSIS — M25.562 CHRONIC PAIN OF LEFT KNEE: Primary | ICD-10-CM

## 2020-12-03 NOTE — TELEPHONE ENCOUNTER
----- Message from Kell Neff PA-C sent at 12/3/2020  2:45 PM CST -----  Contact: self  Can you please contact patient and ask where she would like to complete physical therapy?  ----- Message -----  From: Robbie Ferro  Sent: 12/3/2020   2:20 PM CST  To: TYSON Moser,     Spoke with Dr. Vazquez about patient. Can you send her to physical therapy for her knee, since she has not completed any formal PT.    Thanks,   Robbie  ----- Message -----  From: Anjelica Bradford  Sent: 12/3/2020   8:59 AM CST  To: George DUNCAN Staff    Pt is asking for a call back in regards to  pain management and other questions she has     Contact [facility]- [phone]

## 2020-12-04 ENCOUNTER — TELEPHONE (OUTPATIENT)
Dept: SPORTS MEDICINE | Facility: CLINIC | Age: 20
End: 2020-12-04

## 2020-12-04 NOTE — TELEPHONE ENCOUNTER
LVM for patient to call back.     ----- Message from Fred Mccoy sent at 12/4/2020  9:10 AM CST -----  Contact: self  Pt  calling back to speak with Claus concerning Physical T    Please Call     Contact  [phone]

## 2020-12-11 ENCOUNTER — TELEPHONE (OUTPATIENT)
Dept: ORTHOPEDICS | Facility: CLINIC | Age: 20
End: 2020-12-11

## 2020-12-11 NOTE — TELEPHONE ENCOUNTER
Can you please send PT referral here instead of [facility] as per patient request.    [facility], [facility]  [place] [address]  [phone]  [phone]

## 2021-01-04 ENCOUNTER — PATIENT MESSAGE (OUTPATIENT)
Dept: ADMINISTRATIVE | Facility: HOSPITAL | Age: 21
End: 2021-01-04

## 2021-01-06 ENCOUNTER — PATIENT MESSAGE (OUTPATIENT)
Dept: INTERNAL MEDICINE | Facility: CLINIC | Age: 21
End: 2021-01-06

## 2021-01-06 DIAGNOSIS — F31.31 BIPOLAR AFFECTIVE DISORDER, CURRENTLY DEPRESSED, MILD: Primary | ICD-10-CM

## 2021-01-06 DIAGNOSIS — F41.1 GAD (GENERALIZED ANXIETY DISORDER): ICD-10-CM

## 2021-03-08 ENCOUNTER — OFFICE VISIT (OUTPATIENT)
Dept: INTERNAL MEDICINE | Facility: CLINIC | Age: 21
End: 2021-03-08
Payer: MEDICAID

## 2021-03-08 VITALS
SYSTOLIC BLOOD PRESSURE: 170 MMHG | TEMPERATURE: 98 F | DIASTOLIC BLOOD PRESSURE: 112 MMHG | WEIGHT: 206.13 LBS | OXYGEN SATURATION: 99 % | HEART RATE: 109 BPM | HEIGHT: 65 IN | RESPIRATION RATE: 19 BRPM | BODY MASS INDEX: 34.34 KG/M2

## 2021-03-08 DIAGNOSIS — E66.9 CLASS 1 OBESITY WITH SERIOUS COMORBIDITY AND BODY MASS INDEX (BMI) OF 34.0 TO 34.9 IN ADULT, UNSPECIFIED OBESITY TYPE: ICD-10-CM

## 2021-03-08 DIAGNOSIS — R03.0 BLOOD PRESSURE ELEVATED WITHOUT HISTORY OF HTN: ICD-10-CM

## 2021-03-08 DIAGNOSIS — J20.9 ACUTE BRONCHITIS WITH ASTHMA: Primary | ICD-10-CM

## 2021-03-08 DIAGNOSIS — F41.1 GAD (GENERALIZED ANXIETY DISORDER): ICD-10-CM

## 2021-03-08 DIAGNOSIS — J45.40 MODERATE PERSISTENT ASTHMA WITHOUT COMPLICATION: ICD-10-CM

## 2021-03-08 DIAGNOSIS — R05.9 COUGH: ICD-10-CM

## 2021-03-08 DIAGNOSIS — F31.31 BIPOLAR AFFECTIVE DISORDER, CURRENTLY DEPRESSED, MILD: ICD-10-CM

## 2021-03-08 DIAGNOSIS — J45.909 ACUTE BRONCHITIS WITH ASTHMA: Primary | ICD-10-CM

## 2021-03-08 DIAGNOSIS — Z86.2 HISTORY OF ANEMIA: ICD-10-CM

## 2021-03-08 PROCEDURE — 99999 PR PBB SHADOW E&M-EST. PATIENT-LVL IV: CPT | Mod: PBBFAC,,, | Performed by: NURSE PRACTITIONER

## 2021-03-08 PROCEDURE — 99999 PR PBB SHADOW E&M-EST. PATIENT-LVL IV: ICD-10-PCS | Mod: PBBFAC,,, | Performed by: NURSE PRACTITIONER

## 2021-03-08 PROCEDURE — 99214 OFFICE O/P EST MOD 30 MIN: CPT | Mod: S$PBB,,, | Performed by: NURSE PRACTITIONER

## 2021-03-08 PROCEDURE — U0003 INFECTIOUS AGENT DETECTION BY NUCLEIC ACID (DNA OR RNA); SEVERE ACUTE RESPIRATORY SYNDROME CORONAVIRUS 2 (SARS-COV-2) (CORONAVIRUS DISEASE [COVID-19]), AMPLIFIED PROBE TECHNIQUE, MAKING USE OF HIGH THROUGHPUT TECHNOLOGIES AS DESCRIBED BY CMS-2020-01-R: HCPCS | Performed by: NURSE PRACTITIONER

## 2021-03-08 PROCEDURE — U0005 INFEC AGEN DETEC AMPLI PROBE: HCPCS | Performed by: NURSE PRACTITIONER

## 2021-03-08 PROCEDURE — 99214 PR OFFICE/OUTPT VISIT, EST, LEVL IV, 30-39 MIN: ICD-10-PCS | Mod: S$PBB,,, | Performed by: NURSE PRACTITIONER

## 2021-03-08 PROCEDURE — 99214 OFFICE O/P EST MOD 30 MIN: CPT | Mod: PBBFAC,PN | Performed by: NURSE PRACTITIONER

## 2021-03-08 RX ORDER — BUSPIRONE HYDROCHLORIDE 10 MG/1
10 TABLET ORAL 3 TIMES DAILY
Qty: 90 TABLET | Refills: 0 | Status: SHIPPED | OUTPATIENT
Start: 2021-03-08 | End: 2022-04-01

## 2021-03-08 RX ORDER — ALBUTEROL SULFATE 90 UG/1
1-2 AEROSOL, METERED RESPIRATORY (INHALATION) EVERY 4 HOURS PRN
Qty: 18 G | Refills: 5 | Status: SHIPPED | OUTPATIENT
Start: 2021-03-08 | End: 2021-03-18

## 2021-03-08 RX ORDER — DROSPIRENONE 4 MG/1
1 TABLET, FILM COATED ORAL DAILY
COMMUNITY
Start: 2021-02-16 | End: 2021-11-18

## 2021-03-08 RX ORDER — AMLODIPINE BESYLATE 5 MG/1
5 TABLET ORAL DAILY
Qty: 30 TABLET | Refills: 0 | Status: SHIPPED | OUTPATIENT
Start: 2021-03-08 | End: 2021-04-12 | Stop reason: CLARIF

## 2021-03-08 RX ORDER — FLUOXETINE HYDROCHLORIDE 40 MG/1
40 CAPSULE ORAL DAILY
COMMUNITY
Start: 2021-02-11 | End: 2021-03-12

## 2021-03-09 LAB — SARS-COV-2 RNA RESP QL NAA+PROBE: NOT DETECTED

## 2021-04-06 ENCOUNTER — PATIENT MESSAGE (OUTPATIENT)
Dept: INTERNAL MEDICINE | Facility: CLINIC | Age: 21
End: 2021-04-06

## 2021-04-06 DIAGNOSIS — R03.0 BLOOD PRESSURE ELEVATED WITHOUT HISTORY OF HTN: ICD-10-CM

## 2021-04-06 RX ORDER — AMLODIPINE BESYLATE 5 MG/1
5 TABLET ORAL DAILY
Qty: 30 TABLET | Refills: 0 | OUTPATIENT
Start: 2021-04-06 | End: 2022-04-06

## 2021-04-12 ENCOUNTER — OFFICE VISIT (OUTPATIENT)
Dept: INTERNAL MEDICINE | Facility: CLINIC | Age: 21
End: 2021-04-12
Payer: MEDICAID

## 2021-04-12 ENCOUNTER — PATIENT MESSAGE (OUTPATIENT)
Dept: INTERNAL MEDICINE | Facility: CLINIC | Age: 21
End: 2021-04-12

## 2021-04-12 VITALS
BODY MASS INDEX: 33.57 KG/M2 | HEIGHT: 65 IN | TEMPERATURE: 98 F | OXYGEN SATURATION: 100 % | DIASTOLIC BLOOD PRESSURE: 92 MMHG | SYSTOLIC BLOOD PRESSURE: 144 MMHG | WEIGHT: 201.5 LBS | RESPIRATION RATE: 18 BRPM | HEART RATE: 85 BPM

## 2021-04-12 DIAGNOSIS — E66.9 CLASS 1 OBESITY WITH SERIOUS COMORBIDITY AND BODY MASS INDEX (BMI) OF 33.0 TO 33.9 IN ADULT, UNSPECIFIED OBESITY TYPE: ICD-10-CM

## 2021-04-12 DIAGNOSIS — I10 ESSENTIAL HYPERTENSION: Primary | ICD-10-CM

## 2021-04-12 DIAGNOSIS — Z86.2 HISTORY OF ANEMIA: ICD-10-CM

## 2021-04-12 DIAGNOSIS — J45.40 MODERATE PERSISTENT ASTHMA WITHOUT COMPLICATION: ICD-10-CM

## 2021-04-12 PROCEDURE — 99999 PR PBB SHADOW E&M-EST. PATIENT-LVL IV: CPT | Mod: PBBFAC,,, | Performed by: NURSE PRACTITIONER

## 2021-04-12 PROCEDURE — 99214 OFFICE O/P EST MOD 30 MIN: CPT | Mod: S$PBB,,, | Performed by: NURSE PRACTITIONER

## 2021-04-12 PROCEDURE — 99214 OFFICE O/P EST MOD 30 MIN: CPT | Mod: PBBFAC,PN | Performed by: NURSE PRACTITIONER

## 2021-04-12 PROCEDURE — 99214 PR OFFICE/OUTPT VISIT, EST, LEVL IV, 30-39 MIN: ICD-10-PCS | Mod: S$PBB,,, | Performed by: NURSE PRACTITIONER

## 2021-04-12 PROCEDURE — 99999 PR PBB SHADOW E&M-EST. PATIENT-LVL IV: ICD-10-PCS | Mod: PBBFAC,,, | Performed by: NURSE PRACTITIONER

## 2021-04-12 RX ORDER — AMLODIPINE AND BENAZEPRIL HYDROCHLORIDE 10; 40 MG/1; MG/1
1 CAPSULE ORAL DAILY
Qty: 30 CAPSULE | Refills: 0 | Status: SHIPPED | OUTPATIENT
Start: 2021-04-12 | End: 2021-04-27 | Stop reason: SDUPTHER

## 2021-04-17 LAB
25(OH)D3 SERPL-MCNC: 12 NG/ML (ref 30–100)
ALBUMIN SERPL-MCNC: 4.3 G/DL (ref 3.6–5.1)
ALBUMIN/CREAT UR: 216 MCG/MG CREAT
ALBUMIN/GLOB SERPL: 1.5 (CALC) (ref 1–2.5)
ALP SERPL-CCNC: 72 U/L (ref 31–125)
ALT SERPL-CCNC: 22 U/L (ref 6–29)
AMORPH SED URNS QL MICRO: ABNORMAL /HPF
APPEARANCE UR: CLEAR
AST SERPL-CCNC: 33 U/L (ref 10–30)
BACTERIA #/AREA URNS HPF: ABNORMAL /HPF
BACTERIA UR CULT: ABNORMAL
BACTERIA UR CULT: NORMAL
BASOPHILS # BLD AUTO: 19 CELLS/UL (ref 0–200)
BASOPHILS NFR BLD AUTO: 0.6 %
BILIRUB SERPL-MCNC: 0.6 MG/DL (ref 0.2–1.2)
BILIRUB UR QL STRIP: NEGATIVE
BLASTS # BLD: ABNORMAL CELLS/UL
BLASTS NFR BLD MANUAL: ABNORMAL %
BUN SERPL-MCNC: 8 MG/DL (ref 7–25)
BUN/CREAT SERPL: ABNORMAL (CALC) (ref 6–22)
CALCIUM SERPL-MCNC: 9.4 MG/DL (ref 8.6–10.2)
CAOX CRY #/AREA URNS HPF: ABNORMAL /HPF
CASTS #/AREA URNS LPF: ABNORMAL /LPF
CHLORIDE SERPL-SCNC: 106 MMOL/L (ref 98–110)
CHOLEST SERPL-MCNC: 230 MG/DL
CHOLEST/HDLC SERPL: 5.5 (CALC)
CO2 SERPL-SCNC: 22 MMOL/L (ref 20–32)
COLOR UR: ABNORMAL
CREAT SERPL-MCNC: 0.67 MG/DL (ref 0.5–1.1)
CREAT UR-MCNC: 129 MG/DL (ref 20–275)
CRYSTALS #/AREA URNS HPF: ABNORMAL /HPF
EOSINOPHIL # BLD AUTO: 78 CELLS/UL (ref 15–500)
EOSINOPHIL NFR BLD AUTO: 2.5 %
ERYTHROCYTE [DISTWIDTH] IN BLOOD BY AUTOMATED COUNT: 13 % (ref 11–15)
FERRITIN SERPL-MCNC: 2 NG/ML (ref 16–154)
FOLATE SERPL-MCNC: 9.4 NG/ML
GLOBULIN SER CALC-MCNC: 2.9 G/DL (CALC) (ref 1.9–3.7)
GLUCOSE SERPL-MCNC: 92 MG/DL (ref 65–99)
GLUCOSE UR QL STRIP: NEGATIVE
GRAN CASTS #/AREA URNS LPF: ABNORMAL /LPF
HCT VFR BLD AUTO: 34.4 % (ref 35–45)
HDLC SERPL-MCNC: 42 MG/DL
HGB BLD-MCNC: 11.3 G/DL (ref 11.7–15.5)
HGB UR QL STRIP: ABNORMAL
HYALINE CASTS #/AREA URNS LPF: ABNORMAL /LPF
IRON SATN MFR SERPL: 12 % (CALC) (ref 16–45)
IRON SERPL-MCNC: 44 MCG/DL (ref 40–190)
KETONES UR QL STRIP: NEGATIVE
LDLC SERPL CALC-MCNC: 168 MG/DL (CALC)
LEUKOCYTE ESTERASE UR QL STRIP: ABNORMAL
LYMPHOCYTES # BLD AUTO: 865 CELLS/UL (ref 850–3900)
LYMPHOCYTES NFR BLD AUTO: 27.9 %
MCH RBC QN AUTO: 25.7 PG (ref 27–33)
MCHC RBC AUTO-ENTMCNC: 32.8 G/DL (ref 32–36)
MCV RBC AUTO: 78.2 FL (ref 80–100)
METAMYELOCYTES # BLD: ABNORMAL CELLS/UL
METAMYELOCYTES NFR BLD MANUAL: ABNORMAL %
MICROALBUMIN UR-MCNC: 27.9 MG/DL
MONOCYTES # BLD AUTO: 298 CELLS/UL (ref 200–950)
MONOCYTES NFR BLD AUTO: 9.6 %
MYELOCYTES # BLD: ABNORMAL CELLS/UL
MYELOCYTES NFR BLD MANUAL: ABNORMAL %
NEUTROPHILS # BLD AUTO: 1841 CELLS/UL (ref 1500–7800)
NEUTROPHILS NFR BLD AUTO: 59.4 %
NEUTS BAND # BLD: ABNORMAL CELLS/UL (ref 0–750)
NEUTS BAND NFR BLD MANUAL: ABNORMAL %
NITRITE UR QL STRIP: NEGATIVE
NONHDLC SERPL-MCNC: 188 MG/DL (CALC)
NRBC # BLD: ABNORMAL CELLS/UL
NRBC BLD-RTO: ABNORMAL /100 WBC
PH UR STRIP: 6.5 [PH] (ref 5–8)
PLATELET # BLD AUTO: 63 THOUSAND/UL (ref 140–400)
PMV BLD REES-ECKER: 11.9 FL (ref 7.5–12.5)
POTASSIUM SERPL-SCNC: 3.7 MMOL/L (ref 3.5–5.3)
PROMYELOCYTES # BLD: ABNORMAL CELLS/UL
PROMYELOCYTES NFR BLD MANUAL: ABNORMAL %
PROT SERPL-MCNC: 7.2 G/DL (ref 6.1–8.1)
PROT UR QL STRIP: ABNORMAL
RBC # BLD AUTO: 4.4 MILLION/UL (ref 3.8–5.1)
RBC #/AREA URNS HPF: ABNORMAL /HPF
RENAL EPI CELLS #/AREA URNS HPF: ABNORMAL /HPF
SERVICE CMNT-IMP: ABNORMAL
SODIUM SERPL-SCNC: 137 MMOL/L (ref 135–146)
SP GR UR STRIP: 1.02 (ref 1–1.03)
SQUAMOUS #/AREA URNS HPF: ABNORMAL /HPF
TIBC SERPL-MCNC: 354 MCG/DL (CALC) (ref 250–450)
TRANS CELLS #/AREA URNS HPF: ABNORMAL /HPF
TRI-PHOS CRY #/AREA URNS HPF: ABNORMAL /HPF
TRIGL SERPL-MCNC: 95 MG/DL
TSH SERPL-ACNC: 1.3 MIU/L
URATE CRY #/AREA URNS HPF: ABNORMAL /HPF
VARIANT LYMPHS NFR BLD: ABNORMAL % (ref 0–10)
VIT B12 SERPL-MCNC: 262 PG/ML (ref 200–1100)
WBC # BLD AUTO: 3.1 THOUSAND/UL (ref 3.8–10.8)
WBC #/AREA URNS HPF: ABNORMAL /HPF
YEAST #/AREA URNS HPF: ABNORMAL /HPF

## 2021-04-19 ENCOUNTER — TELEPHONE (OUTPATIENT)
Dept: INTERNAL MEDICINE | Facility: CLINIC | Age: 21
End: 2021-04-19

## 2021-04-19 DIAGNOSIS — N39.0 UTI (URINARY TRACT INFECTION), BACTERIAL: Primary | ICD-10-CM

## 2021-04-19 DIAGNOSIS — D50.9 IRON DEFICIENCY ANEMIA, UNSPECIFIED IRON DEFICIENCY ANEMIA TYPE: ICD-10-CM

## 2021-04-19 DIAGNOSIS — E55.9 VITAMIN D DEFICIENCY: ICD-10-CM

## 2021-04-19 DIAGNOSIS — A49.9 UTI (URINARY TRACT INFECTION), BACTERIAL: Primary | ICD-10-CM

## 2021-04-19 RX ORDER — ERGOCALCIFEROL 1.25 MG/1
50000 CAPSULE ORAL
Qty: 4 CAPSULE | Refills: 11 | Status: SHIPPED | OUTPATIENT
Start: 2021-04-19 | End: 2022-04-12

## 2021-04-19 RX ORDER — AMOXICILLIN AND CLAVULANATE POTASSIUM 875; 125 MG/1; MG/1
1 TABLET, FILM COATED ORAL EVERY 12 HOURS
Qty: 10 TABLET | Refills: 0 | Status: SHIPPED | OUTPATIENT
Start: 2021-04-19 | End: 2021-04-24

## 2021-04-26 ENCOUNTER — PATIENT MESSAGE (OUTPATIENT)
Dept: INTERNAL MEDICINE | Facility: CLINIC | Age: 21
End: 2021-04-26

## 2021-04-26 DIAGNOSIS — I10 ESSENTIAL HYPERTENSION: Primary | ICD-10-CM

## 2021-04-27 ENCOUNTER — PATIENT MESSAGE (OUTPATIENT)
Dept: INTERNAL MEDICINE | Facility: CLINIC | Age: 21
End: 2021-04-27

## 2021-04-27 ENCOUNTER — TELEPHONE (OUTPATIENT)
Dept: INTERNAL MEDICINE | Facility: CLINIC | Age: 21
End: 2021-04-27

## 2021-04-27 DIAGNOSIS — I10 ESSENTIAL HYPERTENSION: ICD-10-CM

## 2021-04-27 RX ORDER — AMLODIPINE AND BENAZEPRIL HYDROCHLORIDE 10; 40 MG/1; MG/1
1 CAPSULE ORAL DAILY
Qty: 30 CAPSULE | Refills: 11 | Status: SHIPPED | OUTPATIENT
Start: 2021-04-27 | End: 2021-05-10 | Stop reason: DRUGHIGH

## 2021-04-27 RX ORDER — SPIRONOLACTONE 25 MG/1
25 TABLET ORAL DAILY
Qty: 30 TABLET | Refills: 11 | Status: SHIPPED | OUTPATIENT
Start: 2021-04-27 | End: 2022-05-19

## 2021-05-04 ENCOUNTER — PATIENT MESSAGE (OUTPATIENT)
Dept: RESEARCH | Facility: HOSPITAL | Age: 21
End: 2021-05-04

## 2021-05-09 ENCOUNTER — PATIENT MESSAGE (OUTPATIENT)
Dept: INTERNAL MEDICINE | Facility: CLINIC | Age: 21
End: 2021-05-09

## 2021-05-09 DIAGNOSIS — I10 ESSENTIAL HYPERTENSION: Primary | ICD-10-CM

## 2021-05-10 ENCOUNTER — PATIENT MESSAGE (OUTPATIENT)
Dept: INTERNAL MEDICINE | Facility: CLINIC | Age: 21
End: 2021-05-10

## 2021-05-10 ENCOUNTER — PATIENT MESSAGE (OUTPATIENT)
Dept: RESEARCH | Facility: HOSPITAL | Age: 21
End: 2021-05-10

## 2021-05-10 RX ORDER — AMLODIPINE AND BENAZEPRIL HYDROCHLORIDE 5; 20 MG/1; MG/1
1 CAPSULE ORAL DAILY
Qty: 90 CAPSULE | Refills: 3 | Status: SHIPPED | OUTPATIENT
Start: 2021-05-10 | End: 2022-05-11 | Stop reason: SDUPTHER

## 2021-05-11 ENCOUNTER — PATIENT MESSAGE (OUTPATIENT)
Dept: INTERNAL MEDICINE | Facility: CLINIC | Age: 21
End: 2021-05-11

## 2021-05-24 ENCOUNTER — PATIENT MESSAGE (OUTPATIENT)
Dept: INTERNAL MEDICINE | Facility: CLINIC | Age: 21
End: 2021-05-24

## 2021-05-25 ENCOUNTER — PATIENT MESSAGE (OUTPATIENT)
Dept: INTERNAL MEDICINE | Facility: CLINIC | Age: 21
End: 2021-05-25

## 2021-06-21 ENCOUNTER — TELEPHONE (OUTPATIENT)
Dept: SPORTS MEDICINE | Facility: CLINIC | Age: 21
End: 2021-06-21

## 2021-06-24 ENCOUNTER — OFFICE VISIT (OUTPATIENT)
Dept: URGENT CARE | Facility: CLINIC | Age: 21
End: 2021-06-24
Payer: MEDICAID

## 2021-06-24 VITALS
TEMPERATURE: 99 F | HEART RATE: 99 BPM | WEIGHT: 188 LBS | RESPIRATION RATE: 20 BRPM | BODY MASS INDEX: 31.32 KG/M2 | DIASTOLIC BLOOD PRESSURE: 73 MMHG | HEIGHT: 65 IN | OXYGEN SATURATION: 97 % | SYSTOLIC BLOOD PRESSURE: 117 MMHG

## 2021-06-24 DIAGNOSIS — S89.92XA KNEE INJURY, LEFT, INITIAL ENCOUNTER: Primary | ICD-10-CM

## 2021-06-24 PROCEDURE — 73562 X-RAY EXAM OF KNEE 3: CPT | Mod: LT,S$GLB,, | Performed by: RADIOLOGY

## 2021-06-24 PROCEDURE — 99214 PR OFFICE/OUTPT VISIT, EST, LEVL IV, 30-39 MIN: ICD-10-PCS | Mod: S$GLB,,, | Performed by: FAMILY MEDICINE

## 2021-06-24 PROCEDURE — 73562 XR KNEE 3 VIEW LEFT: ICD-10-PCS | Mod: LT,S$GLB,, | Performed by: RADIOLOGY

## 2021-06-24 PROCEDURE — 99214 OFFICE O/P EST MOD 30 MIN: CPT | Mod: S$GLB,,, | Performed by: FAMILY MEDICINE

## 2021-06-24 RX ORDER — NAPROXEN 500 MG/1
500 TABLET ORAL 2 TIMES DAILY WITH MEALS
Qty: 20 TABLET | Refills: 0 | Status: SHIPPED | OUTPATIENT
Start: 2021-06-24 | End: 2021-11-18

## 2021-06-24 RX ORDER — TRAMADOL HYDROCHLORIDE 50 MG/1
50 TABLET ORAL EVERY 6 HOURS PRN
Qty: 20 TABLET | Refills: 0 | Status: SHIPPED | OUTPATIENT
Start: 2021-06-24 | End: 2021-11-18

## 2021-06-29 ENCOUNTER — TELEPHONE (OUTPATIENT)
Dept: URGENT CARE | Facility: CLINIC | Age: 21
End: 2021-06-29

## 2021-07-06 ENCOUNTER — HOSPITAL ENCOUNTER (OUTPATIENT)
Dept: RADIOLOGY | Facility: HOSPITAL | Age: 21
Discharge: HOME | End: 2021-07-06
Attending: ORTHOPAEDIC SURGERY
Payer: MEDICAID

## 2021-07-06 ENCOUNTER — OFFICE VISIT (OUTPATIENT)
Dept: SPORTS MEDICINE | Facility: CLINIC | Age: 21
End: 2021-07-06
Payer: MEDICAID

## 2021-07-06 VITALS
HEART RATE: 112 BPM | SYSTOLIC BLOOD PRESSURE: 125 MMHG | DIASTOLIC BLOOD PRESSURE: 81 MMHG | HEIGHT: 65 IN | BODY MASS INDEX: 31.32 KG/M2 | WEIGHT: 188 LBS

## 2021-07-06 DIAGNOSIS — M25.562 LEFT KNEE PAIN, UNSPECIFIED CHRONICITY: ICD-10-CM

## 2021-07-06 DIAGNOSIS — M25.562 LEFT KNEE PAIN, UNSPECIFIED CHRONICITY: Primary | ICD-10-CM

## 2021-07-06 PROCEDURE — 73564 X-RAY EXAM KNEE 4 OR MORE: CPT | Mod: TC,50

## 2021-07-06 PROCEDURE — 99213 OFFICE O/P EST LOW 20 MIN: CPT | Mod: PBBFAC | Performed by: ORTHOPAEDIC SURGERY

## 2021-07-06 PROCEDURE — 73564 XR KNEE ORTHO BILAT WITH FLEXION: ICD-10-PCS | Mod: 26,50,, | Performed by: RADIOLOGY

## 2021-07-06 PROCEDURE — 99214 OFFICE O/P EST MOD 30 MIN: CPT | Mod: S$PBB,,, | Performed by: ORTHOPAEDIC SURGERY

## 2021-07-06 PROCEDURE — 99999 PR PBB SHADOW E&M-EST. PATIENT-LVL III: ICD-10-PCS | Mod: PBBFAC,,, | Performed by: ORTHOPAEDIC SURGERY

## 2021-07-06 PROCEDURE — 99999 PR PBB SHADOW E&M-EST. PATIENT-LVL III: CPT | Mod: PBBFAC,,, | Performed by: ORTHOPAEDIC SURGERY

## 2021-07-06 PROCEDURE — 73564 X-RAY EXAM KNEE 4 OR MORE: CPT | Mod: 26,50,, | Performed by: RADIOLOGY

## 2021-07-06 PROCEDURE — 99214 PR OFFICE/OUTPT VISIT, EST, LEVL IV, 30-39 MIN: ICD-10-PCS | Mod: S$PBB,,, | Performed by: ORTHOPAEDIC SURGERY

## 2021-07-06 RX ORDER — NAPROXEN SODIUM 550 MG/1
550 TABLET ORAL 2 TIMES DAILY WITH MEALS
Qty: 60 TABLET | Refills: 2 | Status: SHIPPED | OUTPATIENT
Start: 2021-07-06 | End: 2021-11-18

## 2021-07-06 RX ORDER — DICLOFENAC SODIUM 10 MG/G
2 GEL TOPICAL 4 TIMES DAILY
Qty: 1 TUBE | Refills: 2 | Status: SHIPPED | OUTPATIENT
Start: 2021-07-06 | End: 2023-05-01

## 2021-07-07 ENCOUNTER — TELEPHONE (OUTPATIENT)
Dept: SPORTS MEDICINE | Facility: CLINIC | Age: 21
End: 2021-07-07

## 2021-07-21 ENCOUNTER — TELEPHONE (OUTPATIENT)
Dept: SPORTS MEDICINE | Facility: CLINIC | Age: 21
End: 2021-07-21

## 2021-10-07 ENCOUNTER — TELEPHONE (OUTPATIENT)
Dept: SPORTS MEDICINE | Facility: CLINIC | Age: 21
End: 2021-10-07

## 2021-11-16 ENCOUNTER — TELEPHONE (OUTPATIENT)
Dept: SPORTS MEDICINE | Facility: CLINIC | Age: 21
End: 2021-11-16
Payer: MEDICAID

## 2021-11-17 DIAGNOSIS — M25.561 PAIN IN BOTH KNEES, UNSPECIFIED CHRONICITY: Primary | ICD-10-CM

## 2021-11-17 DIAGNOSIS — M25.562 PAIN IN BOTH KNEES, UNSPECIFIED CHRONICITY: Primary | ICD-10-CM

## 2021-11-18 ENCOUNTER — PATIENT MESSAGE (OUTPATIENT)
Dept: ORTHOPEDICS | Facility: CLINIC | Age: 21
End: 2021-11-18

## 2021-11-18 ENCOUNTER — HOSPITAL ENCOUNTER (EMERGENCY)
Facility: HOSPITAL | Age: 21
Discharge: HOME | End: 2021-11-18
Attending: EMERGENCY MEDICINE
Payer: MEDICAID

## 2021-11-18 ENCOUNTER — OFFICE VISIT (OUTPATIENT)
Dept: ORTHOPEDICS | Facility: CLINIC | Age: 21
End: 2021-11-18
Payer: MEDICAID

## 2021-11-18 VITALS
WEIGHT: 196 LBS | OXYGEN SATURATION: 99 % | RESPIRATION RATE: 22 BRPM | HEART RATE: 98 BPM | SYSTOLIC BLOOD PRESSURE: 136 MMHG | DIASTOLIC BLOOD PRESSURE: 68 MMHG | TEMPERATURE: 99 F | BODY MASS INDEX: 32.62 KG/M2

## 2021-11-18 VITALS
RESPIRATION RATE: 18 BRPM | DIASTOLIC BLOOD PRESSURE: 70 MMHG | BODY MASS INDEX: 32.62 KG/M2 | WEIGHT: 196 LBS | SYSTOLIC BLOOD PRESSURE: 112 MMHG | HEART RATE: 78 BPM

## 2021-11-18 DIAGNOSIS — G89.29 CHRONIC PAIN OF LEFT KNEE: Primary | ICD-10-CM

## 2021-11-18 DIAGNOSIS — Z98.890 HISTORY OF LEFT KNEE SURGERY: ICD-10-CM

## 2021-11-18 DIAGNOSIS — S09.90XA CLOSED HEAD INJURY, INITIAL ENCOUNTER: Primary | ICD-10-CM

## 2021-11-18 DIAGNOSIS — M25.562 CHRONIC PAIN OF LEFT KNEE: Primary | ICD-10-CM

## 2021-11-18 PROCEDURE — 99999 PR PBB SHADOW E&M-EST. PATIENT-LVL III: ICD-10-PCS | Mod: PBBFAC,,, | Performed by: PHYSICIAN ASSISTANT

## 2021-11-18 PROCEDURE — 99283 EMERGENCY DEPT VISIT LOW MDM: CPT | Mod: 25,27

## 2021-11-18 PROCEDURE — 99999 PR PBB SHADOW E&M-EST. PATIENT-LVL III: CPT | Mod: PBBFAC,,, | Performed by: PHYSICIAN ASSISTANT

## 2021-11-18 PROCEDURE — 99213 OFFICE O/P EST LOW 20 MIN: CPT | Mod: S$PBB,,, | Performed by: PHYSICIAN ASSISTANT

## 2021-11-18 PROCEDURE — 99213 OFFICE O/P EST LOW 20 MIN: CPT | Mod: PBBFAC,PN | Performed by: PHYSICIAN ASSISTANT

## 2021-11-18 PROCEDURE — 99213 PR OFFICE/OUTPT VISIT, EST, LEVL III, 20-29 MIN: ICD-10-PCS | Mod: S$PBB,,, | Performed by: PHYSICIAN ASSISTANT

## 2021-11-18 PROCEDURE — 99282 PR EMERGENCY DEPT VISIT,LEVEL II: ICD-10-PCS | Mod: ,,, | Performed by: EMERGENCY MEDICINE

## 2021-11-18 PROCEDURE — 99282 EMERGENCY DEPT VISIT SF MDM: CPT | Mod: ,,, | Performed by: EMERGENCY MEDICINE

## 2021-11-18 RX ORDER — TRAMADOL HYDROCHLORIDE 50 MG/1
50 TABLET ORAL EVERY 12 HOURS PRN
Qty: 14 TABLET | Refills: 0 | Status: SHIPPED | OUTPATIENT
Start: 2021-11-18 | End: 2021-11-25

## 2021-12-01 PROBLEM — K61.0 ABSCESS, PERIANAL: Status: ACTIVE | Noted: 2021-12-01

## 2021-12-01 PROBLEM — L02.215 PERINEAL ABSCESS: Status: ACTIVE | Noted: 2021-12-01

## 2022-04-01 ENCOUNTER — OFFICE VISIT (OUTPATIENT)
Dept: ORTHOPEDICS | Facility: CLINIC | Age: 22
End: 2022-04-01
Payer: MEDICAID

## 2022-04-01 VITALS
HEART RATE: 89 BPM | SYSTOLIC BLOOD PRESSURE: 112 MMHG | WEIGHT: 189.63 LBS | DIASTOLIC BLOOD PRESSURE: 70 MMHG | HEIGHT: 65 IN | BODY MASS INDEX: 31.59 KG/M2 | RESPIRATION RATE: 18 BRPM

## 2022-04-01 DIAGNOSIS — G89.29 CHRONIC PAIN OF LEFT KNEE: Primary | ICD-10-CM

## 2022-04-01 DIAGNOSIS — M25.562 CHRONIC PAIN OF LEFT KNEE: Primary | ICD-10-CM

## 2022-04-01 DIAGNOSIS — Z98.890 HISTORY OF LEFT KNEE SURGERY: ICD-10-CM

## 2022-04-01 PROCEDURE — 4010F ACE/ARB THERAPY RXD/TAKEN: CPT | Mod: CPTII,,, | Performed by: PHYSICIAN ASSISTANT

## 2022-04-01 PROCEDURE — 4010F PR ACE/ARB THEARPY RXD/TAKEN: ICD-10-PCS | Mod: CPTII,,, | Performed by: PHYSICIAN ASSISTANT

## 2022-04-01 PROCEDURE — 1159F MED LIST DOCD IN RCRD: CPT | Mod: CPTII,,, | Performed by: PHYSICIAN ASSISTANT

## 2022-04-01 PROCEDURE — 3074F PR MOST RECENT SYSTOLIC BLOOD PRESSURE < 130 MM HG: ICD-10-PCS | Mod: CPTII,,, | Performed by: PHYSICIAN ASSISTANT

## 2022-04-01 PROCEDURE — 1160F PR REVIEW ALL MEDS BY PRESCRIBER/CLIN PHARMACIST DOCUMENTED: ICD-10-PCS | Mod: CPTII,,, | Performed by: PHYSICIAN ASSISTANT

## 2022-04-01 PROCEDURE — 1160F RVW MEDS BY RX/DR IN RCRD: CPT | Mod: CPTII,,, | Performed by: PHYSICIAN ASSISTANT

## 2022-04-01 PROCEDURE — 99213 OFFICE O/P EST LOW 20 MIN: CPT | Mod: S$PBB,,, | Performed by: PHYSICIAN ASSISTANT

## 2022-04-01 PROCEDURE — 3074F SYST BP LT 130 MM HG: CPT | Mod: CPTII,,, | Performed by: PHYSICIAN ASSISTANT

## 2022-04-01 PROCEDURE — 1159F PR MEDICATION LIST DOCUMENTED IN MEDICAL RECORD: ICD-10-PCS | Mod: CPTII,,, | Performed by: PHYSICIAN ASSISTANT

## 2022-04-01 PROCEDURE — 3078F DIAST BP <80 MM HG: CPT | Mod: CPTII,,, | Performed by: PHYSICIAN ASSISTANT

## 2022-04-01 PROCEDURE — 3008F PR BODY MASS INDEX (BMI) DOCUMENTED: ICD-10-PCS | Mod: CPTII,,, | Performed by: PHYSICIAN ASSISTANT

## 2022-04-01 PROCEDURE — 3008F BODY MASS INDEX DOCD: CPT | Mod: CPTII,,, | Performed by: PHYSICIAN ASSISTANT

## 2022-04-01 PROCEDURE — 3078F PR MOST RECENT DIASTOLIC BLOOD PRESSURE < 80 MM HG: ICD-10-PCS | Mod: CPTII,,, | Performed by: PHYSICIAN ASSISTANT

## 2022-04-01 PROCEDURE — 99999 PR PBB SHADOW E&M-EST. PATIENT-LVL IV: CPT | Mod: PBBFAC,,, | Performed by: PHYSICIAN ASSISTANT

## 2022-04-01 PROCEDURE — 99213 PR OFFICE/OUTPT VISIT, EST, LEVL III, 20-29 MIN: ICD-10-PCS | Mod: S$PBB,,, | Performed by: PHYSICIAN ASSISTANT

## 2022-04-01 PROCEDURE — 99214 OFFICE O/P EST MOD 30 MIN: CPT | Mod: PBBFAC | Performed by: PHYSICIAN ASSISTANT

## 2022-04-01 PROCEDURE — 99999 PR PBB SHADOW E&M-EST. PATIENT-LVL IV: ICD-10-PCS | Mod: PBBFAC,,, | Performed by: PHYSICIAN ASSISTANT

## 2022-04-01 RX ORDER — LORATADINE 10 MG/1
10 TABLET ORAL DAILY
COMMUNITY
End: 2023-05-01

## 2022-04-01 NOTE — PROGRESS NOTES
Subjective:      Patient ID: Sheila Urbina is a 21 y.o. female.    Chief Complaint: Pain of the Left Knee    Review of patient's allergies indicates:   Allergen Reactions    Oxycontin [oxycodone]      Nightmares        Pt returns to clinic for follow up of left knee pain.  No recent injury or trauma.  Reports no improvement of pain since she was last here.  She was referred to Dr. Vazquez at last visit here.  He referred her to PT which she completed with little improvement of sx.  He also prescribed Anaprox and Voltaren gel which did not provide much relief.    She then had follow up with SAFIA Ibarra in November 2021. Per chart review Selina discussed pt with Dr. Vazquez who recommended MRI of left knee with metal subtraction sequence.  Per pt MRI was never scheduled.    She reports multiple falls onto knee since last ortho appointment.    Today she c/o diffuse knee pain described as grinding.  Worse with bending knee and walking, improves some with rest.      8/27/20:  19 yo F presents to clinic with h/o left knee pain x years.    She has complex surgical history as listed below.   8/2013 MPFL reconstruction  2/2015 Jasper osteotomy  8/2017 distal femur varus-producing osteotomy.  Knee scope. 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  Pain worse with standing, walking, and after sitting too long.  Better after applying ice packs, states that this is the only thing that she has tried that provides any relief.    She has previously tried physical therapy with no relief of symptoms.  She reports that she several series of Euflexxa injections which seemed to provide less relief each time. Last injections in May 2019.  Has also tried Mobic with no improvement.  She is asking about any surgical options, states that knee pain is affecting ADLs and preventing her from career path as .    Denies any new injury or trauma.    Currently studying [employer]  education at [employer].         Review of Systems   Constitution: Negative for chills, diaphoresis and fever.   HENT: Negative for congestion, ear discharge and ear pain.    Eyes: Negative for blurred vision, discharge, double vision and pain.   Cardiovascular: Negative for chest pain, claudication and cyanosis.   Respiratory: Negative for cough, hemoptysis and shortness of breath.    Endocrine: Negative for cold intolerance and heat intolerance.   Skin: Negative for color change, dry skin, itching and rash.   Musculoskeletal: Negative for arthritis, back pain, falls, gout, joint pain, joint swelling, muscle weakness and neck pain.   Gastrointestinal: Negative for abdominal pain and change in bowel habit.   Neurological: Negative for brief paralysis, disturbances in coordination and dizziness.   Psychiatric/Behavioral: Negative for altered mental status and depression.         Objective:          General    Constitutional: She is oriented to person, place, and time. She appears well-developed and well-nourished. No distress.   HENT:   Head: Atraumatic.   Eyes: EOM are normal. Right eye exhibits no discharge. Left eye exhibits no discharge.   Cardiovascular: Normal rate.    Pulmonary/Chest: Effort normal. No respiratory distress.   Abdominal: Soft.   Neurological: She is alert and oriented to person, place, and time.   Psychiatric: She has a normal mood and affect. Her behavior is normal.           Right Knee Exam     Inspection   Erythema: absent  Scars: absent  Swelling: absent  Effusion: absent  Deformity: absent  Bruising: absent    Range of Motion   Extension: 0   Right knee flexion: 145.     Tests   Ligament Examination Lachman: normal (-1 to 2mm) PCL-Posterior Drawer: normal (0 to 2mm)     MCL - Valgus: normal (0 to 2mm)  LCL - Varus: normal  Patella   Patellar Grind: negative  J-Sign: none    Other   Sensation: normal    Left Knee Exam     Inspection   Erythema: absent  Scars: present  Swelling:  absent  Effusion: absent  Deformity: absent  Bruising: present    Tenderness   The patient tender to palpation of the patella, patellar tendon, lateral joint line and medial joint line.    Crepitus   The patient has crepitus of the patella.    Range of Motion   Extension: 0   Left knee flexion: 145.     Tests   Stability Lachman: normal (-1 to 2mm) PCL-Posterior Drawer: normal (0 to 2mm)  MCL - Valgus: normal (0 to 2mm)  LCL - Varus: normal (0 to 2mm)  Patella   Patellar Grind: negative  J-Sign: J sign absent    Other   Sensation: normal    Difficult exam due to patient discomfort.      Muscle Strength   Right Lower Extremity   Hip Abduction: 5/5   Quadriceps:  5/5   Hamstrin/5   Left Lower Extremity   Hip Abduction: 5/5   Quadriceps:  5/5   Hamstrin/5     Vascular Exam     Right Pulses  Dorsalis Pedis:      2+          Left Pulses  Dorsalis Pedis:      2+          Edema  Right Lower Leg: absent  Left Lower Leg: absent              Assessment:         Xray Left Knee 20:  Postoperative changes of the left distal femur are identified with surgical screws also seen in within the proximal tibia no hardware complication is appreciated.  No fracture or subluxation.  Mild degenerative changes of the patellofemoral compartment on the left.  No joint effusions    MRI Left Knee 18:  Bone marrow edema in the inferomedial patella and superomedial trochlea with edema of Hoffa's fat pad may reflect patellar maltracking and possibly osseous contusion.    Encounter Diagnoses   Name Primary?    Chronic pain of left knee Yes    History of left knee surgery     Chronic pain of left knee  -     MRI Knee Without Contrast Left; Future; Expected date: 2022  -     X-ray Knee Ortho Left with Flexion; Future; Expected date: 2022    History of left knee surgery  -     MRI Knee Without Contrast Left; Future; Expected date: 2022               Plan:         Patient states that she would not like to try any  more medications, therapy, or injections since they have not worked in the past. She is interested in pain management or additional surgery.   Pt states that she thought that she was seeing Selina Mckeon today and made appointment at wrong location.  We will have MRI scheduled and consider referral either back to Dr. Vazquez or to sports medicine.    1. MRI left knee  2. Continue diclofenac gel as prescribed as needed.  3. Knee brace as directed.  4. Continue icing knee as directed.  5. RTC after MRI for results. Consider referral to Dr. Vazquez/sports medicine if needed.    Patient voices understanding of and agreement with treatment plan. All of the patient's and her mother's questions were answered and the patient will contact us if she has any questions or concerns in the interim.

## 2022-04-07 LAB — PAP RECOMMENDATION EXT: NORMAL

## 2022-04-17 NOTE — TELEPHONE ENCOUNTER
Returning call to patient.  She requested to be seen at the [facility] location.  Scheduled for 9/11 for left knee.   unable to assess

## 2022-04-18 ENCOUNTER — PATIENT MESSAGE (OUTPATIENT)
Dept: ORTHOPEDICS | Facility: CLINIC | Age: 22
End: 2022-04-18
Payer: MEDICAID

## 2022-04-19 ENCOUNTER — OFFICE VISIT (OUTPATIENT)
Dept: ORTHOPEDICS | Facility: CLINIC | Age: 22
End: 2022-04-19
Payer: MEDICAID

## 2022-04-19 DIAGNOSIS — G89.29 CHRONIC PAIN OF LEFT KNEE: Primary | ICD-10-CM

## 2022-04-19 DIAGNOSIS — Z98.890 HISTORY OF LEFT KNEE SURGERY: ICD-10-CM

## 2022-04-19 DIAGNOSIS — M25.562 CHRONIC PAIN OF LEFT KNEE: Primary | ICD-10-CM

## 2022-04-19 PROCEDURE — 4010F PR ACE/ARB THEARPY RXD/TAKEN: ICD-10-PCS | Mod: CPTII,95,, | Performed by: PHYSICIAN ASSISTANT

## 2022-04-19 PROCEDURE — 1160F PR REVIEW ALL MEDS BY PRESCRIBER/CLIN PHARMACIST DOCUMENTED: ICD-10-PCS | Mod: CPTII,95,, | Performed by: PHYSICIAN ASSISTANT

## 2022-04-19 PROCEDURE — 1159F MED LIST DOCD IN RCRD: CPT | Mod: CPTII,95,, | Performed by: PHYSICIAN ASSISTANT

## 2022-04-19 PROCEDURE — 4010F ACE/ARB THERAPY RXD/TAKEN: CPT | Mod: CPTII,95,, | Performed by: PHYSICIAN ASSISTANT

## 2022-04-19 PROCEDURE — 1160F RVW MEDS BY RX/DR IN RCRD: CPT | Mod: CPTII,95,, | Performed by: PHYSICIAN ASSISTANT

## 2022-04-19 PROCEDURE — 99213 PR OFFICE/OUTPT VISIT, EST, LEVL III, 20-29 MIN: ICD-10-PCS | Mod: 95,,, | Performed by: PHYSICIAN ASSISTANT

## 2022-04-19 PROCEDURE — 99213 OFFICE O/P EST LOW 20 MIN: CPT | Mod: 95,,, | Performed by: PHYSICIAN ASSISTANT

## 2022-04-19 PROCEDURE — 1159F PR MEDICATION LIST DOCUMENTED IN MEDICAL RECORD: ICD-10-PCS | Mod: CPTII,95,, | Performed by: PHYSICIAN ASSISTANT

## 2022-04-19 NOTE — PROGRESS NOTES
The patient location is: home  The chief complaint leading to consultation is: left knee pain    Visit type: audiovisual    Face to Face time with patient: 10  20 minutes of total time spent on the encounter, which includes face to face time and non-face to face time preparing to see the patient (eg, review of tests), Obtaining and/or reviewing separately obtained history, Documenting clinical information in the electronic or other health record, Independently interpreting results (not separately reported) and communicating results to the patient/family/caregiver, or Care coordination (not separately reported).       Notes:       Today pt presents for MRI follow up.  No change in sx since last visit.    4/1/22:  Pt returns to clinic for follow up of left knee pain.  No recent injury or trauma.  Reports no improvement of pain since she was last here.  She was referred to Dr. Vazquez at last visit here.  He referred her to PT which she completed with little improvement of sx.  He also prescribed Anaprox and Voltaren gel which did not provide much relief.     She then had follow up with SAFIA Ibarra in November 2021. Per chart review Selina discussed pt with Dr. Vazquez who recommended MRI of left knee with metal subtraction sequence.  Per pt MRI was never scheduled.     She reports multiple falls onto knee since last ortho appointment.     Today she c/o diffuse knee pain described as grinding.  Worse with bending knee and walking, improves some with rest.        8/27/20:  19 yo F presents to clinic with h/o left knee pain x years.     She has complex surgical history as listed below.   8/2013 MPFL reconstruction  2/2015 Jasper osteotomy  8/2017 distal femur varus-producing osteotomy.  Knee scope. 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  Pain worse with standing, walking, and after sitting too long.  Better after applying ice packs, states that this is the  only thing that she has tried that provides any relief.     She has previously tried physical therapy with no relief of symptoms.  She reports that she several series of Euflexxa injections which seemed to provide less relief each time. Last injections in May 2019.  Has also tried Mobic with no improvement.  She is asking about any surgical options, states that knee pain is affecting ADLs and preventing her from career path as .     Denies any new injury or trauma.     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          MRI Left Knee 4/8/22:  Postoperative changes involving the distal femur and proximal tibia as well as of the patella.  Small amount of fluid in the knee joint.          We discussed MRI results.  Pt would like to see surgeon, will place referral to sports medicine Dr. Hunter.  Pt did not have any further questions or concerns.   She will follow up in clinic as needed.

## 2022-05-11 PROBLEM — F41.9 ANXIETY: Status: ACTIVE | Noted: 2022-05-11

## 2022-09-16 ENCOUNTER — PATIENT MESSAGE (OUTPATIENT)
Dept: ORTHOPEDICS | Facility: CLINIC | Age: 22
End: 2022-09-16
Payer: MEDICAID

## 2022-09-16 DIAGNOSIS — M25.562 CHRONIC PAIN OF LEFT KNEE: Primary | ICD-10-CM

## 2022-09-16 DIAGNOSIS — G89.29 CHRONIC PAIN OF LEFT KNEE: Primary | ICD-10-CM

## 2022-09-16 NOTE — PROGRESS NOTES
Subjective:      Patient ID: Sheila Urbina is a 22 y.o. female.    Chief Complaint: Pain of the Left Knee      HPI  (George)    Last seen by Dr. Vazquez on 7/6/21 for left knee pain.     She has complex surgical history as listed below.   8/2013 MPFL reconstruction  2/2015 Jasper osteotomy  8/2017 distal femur varus-producing osteotomy.  Knee scope. 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 She had MRI of left knee on 4/8/22 that showed postop changes and small amount of fluid in left knee joint.     She was referred to Dr. Hunter and is here for follow up.     She had a fall down a hill with left leg bent behind her at the end July. She has increased left knee pain with giving way (this is new and happens 1-2 times per week). She has intermittent numbness in entire left leg along with feelings of ice cold water dripping down her leg. No LBP or radicular leg pain. She rates her pain as a 7 on a scale of 1-10. Some relief with keeping her leg straight.     She is using voltaren gel prn with minimal relief. She has been on multiple NSAIDs in the past without relief. Last time she did PT was last August- this was making her worse.       Past Medical History:   Diagnosis Date    Aggressive behavior     post anesthesia    Essential hypertension 4/12/2021    JAN (generalized anxiety disorder) 9/18/2020    Granuloma of skin     left lower eye lid at age 4    Hypertension     Mild intermittent asthma without complication 9/18/2020    Obesity 5/7/2018    Ovarian cyst 2013    Recurrent dislocation of left patella     Vision abnormalities          Current Outpatient Medications:     albuterol (PROVENTIL/VENTOLIN HFA) 90 mcg/actuation inhaler, INHALE  2 PUFFS INTO THE LUNGS EVERY 4 TO 6 HOURS AS NEEDED FOR WHEEZING, Disp: 18 g, Rfl: 5    amlodipine-benazepril 5-20 mg (LOTREL) 5-20 mg per capsule, Take 1 capsule by mouth once daily., Disp: 90 capsule,  Rfl: 3    diclofenac sodium (VOLTAREN) 1 % Gel, Apply 2 g topically 4 (four) times daily., Disp: 1 Tube, Rfl: 2    FLUoxetine 40 MG capsule, Prozac 40 mg capsule  Take 1 capsule every day by oral route., Disp: , Rfl:     fluticasone propionate (FLONASE) 50 mcg/actuation nasal spray, SHAKE LIQUID AND USE 1 SPRAY(50 MCG) IN EACH NOSTRIL TWICE DAILY AS NEEDED FOR RHINITIS OR ALLERGIES, Disp: 16 g, Rfl: 1    loratadine (CLARITIN) 10 mg tablet, Take 10 mg by mouth once daily., Disp: , Rfl:     nebulizer accessories Kit, 3 neb kits per month for 12 months, Disp: 3 kit, Rfl: 12    norethindrone-e.estradiol-iron (LOESTRIN 24 FE ORAL), Take by mouth., Disp: , Rfl:     spironolactone (ALDACTONE) 25 MG tablet, TAKE 1 TABLET(25 MG) BY MOUTH EVERY DAY, Disp: 30 tablet, Rfl: 1    Review of patient's allergies indicates:   Allergen Reactions    Oxycontin [oxycodone]      Nightmares       Review of Systems   Constitutional: Negative for chills, fever, night sweats and weight gain.   Gastrointestinal:  Negative for bowel incontinence, nausea and vomiting.   Genitourinary:  Negative for bladder incontinence.   Neurological:  Negative for disturbances in coordination and loss of balance.       Objective:        Wt 84.5 kg (186 lb 4.8 oz)   BMI 31.00 kg/m²     Ortho/SPM Exam    Body habitus is normal.   The patient walks with a limp.    LEFT KNEE EXAM:  Resisted SLR negative.   The skin over the knee is  has multiple healed scars .  Knee effusion not significant   Tendernes is located medial ant lateral.   Range of motion- Flexion 100 deg with pain, Extension 0 deg,     Ligament exam:   MCL intact   Lachman intact              Post sag intact    LCL intact    No gross instability of knee, but this causes pain.     Patellar apprehension negative.  Popliteal cyst negative  Patellar crepitation absent.  Flexion/pinch (McMurrays) positive for pain    Motor normal 5/5 strength in all tested muscle groups.   Sensory normal.      XRAY  INTERPRETATION:  X-rays of bilateral knees dated 9/20/22 are personally reviewed and show hardware intact in left knee with reasonable maintenance of joint space.         Assessment:       Encounter Diagnoses   Name Primary?    Chronic pain of left knee Yes    History of left knee surgery             Plan:       Sheila was seen today for pain.    Diagnoses and all orders for this visit:    Chronic pain of left knee  -     MRI Knee Without Contrast Left; Future    History of left knee surgery  -     MRI Knee Without Contrast Left; Future      Complex history with multiple left knee surgeries as above.     She had acute trauma to knee at the end of July 2022 when she fell down a hill with left leg bent behind her.     XRs of bilateral knees show hardware in left knee in good position.     Treatment options reviewed with patient along with above bilateral knee xrays. Following plan made:     - MRI of left knee with MARS protocol to evaluate worsening left knee pain and giving way after acute trauma.   - No relief with knee brace.   - Previous PT made her worse.   - No improvement with multiple NSAIDs. Can use voltaren gel prn.   - Given form for temporary handicapped placard.   - Discussed follow up options. She would like to follow up in [place] with Dr. Hunter after her MRI.     Follow up in about 3 weeks (around 10/11/2022) for evaluation with Dr. Hunter.         ADDENDUM 9/28/22:   MRI of left knee dated 9/27/22 is personally reviewed and shows:   Postoperative changes involving the distal femur and proximal tibia as well as the patella.  Trace amount of fluid in the popliteal fossa.    Will review with Dr. Vazquez and advise her on follow up plan.     ADDENDUM 10/4/22:   Dr. Vazquez reviewed her left knee MRI. He recommends she continue physical therapy and possible euflexxa series of injections.     Patient sent a message.

## 2022-09-20 ENCOUNTER — OFFICE VISIT (OUTPATIENT)
Dept: ORTHOPEDICS | Facility: CLINIC | Age: 22
End: 2022-09-20
Payer: MEDICAID

## 2022-09-20 VITALS — BODY MASS INDEX: 31 KG/M2 | WEIGHT: 186.31 LBS

## 2022-09-20 DIAGNOSIS — Z98.890 HISTORY OF LEFT KNEE SURGERY: ICD-10-CM

## 2022-09-20 DIAGNOSIS — G89.29 CHRONIC PAIN OF LEFT KNEE: Primary | ICD-10-CM

## 2022-09-20 DIAGNOSIS — M25.562 CHRONIC PAIN OF LEFT KNEE: Primary | ICD-10-CM

## 2022-09-20 PROCEDURE — 99213 OFFICE O/P EST LOW 20 MIN: CPT | Mod: PBBFAC,PN | Performed by: PHYSICIAN ASSISTANT

## 2022-09-20 PROCEDURE — 99999 PR PBB SHADOW E&M-EST. PATIENT-LVL III: CPT | Mod: PBBFAC,,, | Performed by: PHYSICIAN ASSISTANT

## 2022-09-20 PROCEDURE — 1159F MED LIST DOCD IN RCRD: CPT | Mod: CPTII,,, | Performed by: PHYSICIAN ASSISTANT

## 2022-09-20 PROCEDURE — 1160F PR REVIEW ALL MEDS BY PRESCRIBER/CLIN PHARMACIST DOCUMENTED: ICD-10-PCS | Mod: CPTII,,, | Performed by: PHYSICIAN ASSISTANT

## 2022-09-20 PROCEDURE — 3008F PR BODY MASS INDEX (BMI) DOCUMENTED: ICD-10-PCS | Mod: CPTII,,, | Performed by: PHYSICIAN ASSISTANT

## 2022-09-20 PROCEDURE — 1160F RVW MEDS BY RX/DR IN RCRD: CPT | Mod: CPTII,,, | Performed by: PHYSICIAN ASSISTANT

## 2022-09-20 PROCEDURE — 3008F BODY MASS INDEX DOCD: CPT | Mod: CPTII,,, | Performed by: PHYSICIAN ASSISTANT

## 2022-09-20 PROCEDURE — 99213 OFFICE O/P EST LOW 20 MIN: CPT | Mod: S$PBB,,, | Performed by: PHYSICIAN ASSISTANT

## 2022-09-20 PROCEDURE — 4010F PR ACE/ARB THEARPY RXD/TAKEN: ICD-10-PCS | Mod: CPTII,,, | Performed by: PHYSICIAN ASSISTANT

## 2022-09-20 PROCEDURE — 99213 PR OFFICE/OUTPT VISIT, EST, LEVL III, 20-29 MIN: ICD-10-PCS | Mod: S$PBB,,, | Performed by: PHYSICIAN ASSISTANT

## 2022-09-20 PROCEDURE — 99999 PR PBB SHADOW E&M-EST. PATIENT-LVL III: ICD-10-PCS | Mod: PBBFAC,,, | Performed by: PHYSICIAN ASSISTANT

## 2022-09-20 PROCEDURE — 4010F ACE/ARB THERAPY RXD/TAKEN: CPT | Mod: CPTII,,, | Performed by: PHYSICIAN ASSISTANT

## 2022-09-20 PROCEDURE — 1159F PR MEDICATION LIST DOCUMENTED IN MEDICAL RECORD: ICD-10-PCS | Mod: CPTII,,, | Performed by: PHYSICIAN ASSISTANT

## 2022-09-20 NOTE — PATIENT INSTRUCTIONS
It was nice to see you again today! I am sorry that you are hurting so much.     I want to get an MRI of your knee to look into things further.     I want you to follow up with Dr. Hunter to review the MRI. We will call you with this appointment.     Please stay in touch and call me if you need anything. You can also send me a message in [facility].     Selina   [phone]

## 2022-09-22 ENCOUNTER — TELEPHONE (OUTPATIENT)
Dept: ORTHOPEDICS | Facility: CLINIC | Age: 22
End: 2022-09-22
Payer: MEDICAID

## 2022-09-22 NOTE — TELEPHONE ENCOUNTER
Spoke with Pt let her know that MRI was approved and reminded her of Date, Time, and Place. I also let her know that Selina will wait until after MRI to make a follow up with a provider depending on MRI findings

## 2022-09-28 ENCOUNTER — PATIENT MESSAGE (OUTPATIENT)
Dept: ORTHOPEDICS | Facility: CLINIC | Age: 22
End: 2022-09-28
Payer: MEDICAID

## 2022-10-04 ENCOUNTER — PATIENT MESSAGE (OUTPATIENT)
Dept: ORTHOPEDICS | Facility: CLINIC | Age: 22
End: 2022-10-04
Payer: MEDICAID

## 2022-10-18 PROBLEM — K60.30 PERIANAL FISTULA: Status: ACTIVE | Noted: 2022-10-18

## 2022-10-18 PROBLEM — K60.3 PERIANAL FISTULA: Status: ACTIVE | Noted: 2022-10-18

## 2022-10-19 ENCOUNTER — ANESTHESIA EVENT (OUTPATIENT)
Dept: SURGERY | Facility: HOSPITAL | Age: 22
End: 2022-10-19
Payer: MEDICAID

## 2022-10-19 ENCOUNTER — HOSPITAL ENCOUNTER (OUTPATIENT)
Facility: HOSPITAL | Age: 22
Discharge: HOME | End: 2022-10-19
Attending: SURGERY | Admitting: SURGERY
Payer: MEDICAID

## 2022-10-19 ENCOUNTER — ANESTHESIA (OUTPATIENT)
Dept: SURGERY | Facility: HOSPITAL | Age: 22
End: 2022-10-19
Payer: MEDICAID

## 2022-10-19 VITALS
OXYGEN SATURATION: 97 % | SYSTOLIC BLOOD PRESSURE: 118 MMHG | DIASTOLIC BLOOD PRESSURE: 75 MMHG | RESPIRATION RATE: 16 BRPM | TEMPERATURE: 98 F | HEART RATE: 79 BPM

## 2022-10-19 DIAGNOSIS — K60.3 PERIANAL FISTULA: ICD-10-CM

## 2022-10-19 LAB — B-HCG UR QL: NEGATIVE

## 2022-10-19 PROCEDURE — 37000008 HC ANESTHESIA 1ST 15 MINUTES: Performed by: SURGERY

## 2022-10-19 PROCEDURE — 63600175 PHARM REV CODE 636 W HCPCS: Performed by: NURSE ANESTHETIST, CERTIFIED REGISTERED

## 2022-10-19 PROCEDURE — 00902 ANES ANORECTAL PX: CPT | Performed by: SURGERY

## 2022-10-19 PROCEDURE — 25000003 PHARM REV CODE 250: Performed by: NURSE ANESTHETIST, CERTIFIED REGISTERED

## 2022-10-19 PROCEDURE — 25000003 PHARM REV CODE 250: Performed by: SURGERY

## 2022-10-19 PROCEDURE — 36000705 HC OR TIME LEV I EA ADD 15 MIN: Performed by: SURGERY

## 2022-10-19 PROCEDURE — 71000033 HC RECOVERY, INTIAL HOUR: Performed by: SURGERY

## 2022-10-19 PROCEDURE — 81025 URINE PREGNANCY TEST: CPT | Performed by: SURGERY

## 2022-10-19 PROCEDURE — 71000039 HC RECOVERY, EACH ADD'L HOUR: Performed by: SURGERY

## 2022-10-19 PROCEDURE — D9220AH HC ANESTHESIA PROFESSIONAL FEE: Mod: QZ | Performed by: NURSE ANESTHETIST, CERTIFIED REGISTERED

## 2022-10-19 PROCEDURE — 36000704 HC OR TIME LEV I 1ST 15 MIN: Performed by: SURGERY

## 2022-10-19 PROCEDURE — 37000009 HC ANESTHESIA EA ADD 15 MINS: Performed by: SURGERY

## 2022-10-19 PROCEDURE — 63600175 PHARM REV CODE 636 W HCPCS: Performed by: SURGERY

## 2022-10-19 PROCEDURE — 00902 ANES ANORECTAL PX: CPT | Mod: QZ | Performed by: NURSE ANESTHETIST, CERTIFIED REGISTERED

## 2022-10-19 RX ORDER — SODIUM CHLORIDE 9 MG/ML
INJECTION, SOLUTION INTRAVENOUS CONTINUOUS
Status: DISCONTINUED | OUTPATIENT
Start: 2022-10-19 | End: 2022-10-19 | Stop reason: HOSPADM

## 2022-10-19 RX ORDER — DEXAMETHASONE SODIUM PHOSPHATE 4 MG/ML
INJECTION, SOLUTION INTRA-ARTICULAR; INTRALESIONAL; INTRAMUSCULAR; INTRAVENOUS; SOFT TISSUE
Status: DISCONTINUED | OUTPATIENT
Start: 2022-10-19 | End: 2022-10-19

## 2022-10-19 RX ORDER — HYDROCODONE BITARTRATE AND ACETAMINOPHEN 10; 325 MG/1; MG/1
1 TABLET ORAL EVERY 4 HOURS PRN
Status: DISCONTINUED | OUTPATIENT
Start: 2022-10-19 | End: 2022-10-19 | Stop reason: HOSPADM

## 2022-10-19 RX ORDER — MIDAZOLAM HYDROCHLORIDE 1 MG/ML
INJECTION INTRAMUSCULAR; INTRAVENOUS
Status: COMPLETED
Start: 2022-10-19 | End: 2022-10-19

## 2022-10-19 RX ORDER — HYDROCODONE BITARTRATE AND ACETAMINOPHEN 5; 325 MG/1; MG/1
1 TABLET ORAL EVERY 4 HOURS PRN
Qty: 30 TABLET | Refills: 0 | Status: SHIPPED | OUTPATIENT
Start: 2022-10-19 | End: 2023-05-01

## 2022-10-19 RX ORDER — SODIUM CHLORIDE, SODIUM LACTATE, POTASSIUM CHLORIDE, CALCIUM CHLORIDE 600; 310; 30; 20 MG/100ML; MG/100ML; MG/100ML; MG/100ML
INJECTION, SOLUTION INTRAVENOUS CONTINUOUS PRN
Status: DISCONTINUED | OUTPATIENT
Start: 2022-10-19 | End: 2022-10-19

## 2022-10-19 RX ORDER — LIDOCAINE HYDROCHLORIDE 20 MG/ML
JELLY TOPICAL
Status: DISCONTINUED
Start: 2022-10-19 | End: 2022-10-19 | Stop reason: HOSPADM

## 2022-10-19 RX ORDER — ONDANSETRON 2 MG/ML
INJECTION INTRAMUSCULAR; INTRAVENOUS
Status: DISCONTINUED | OUTPATIENT
Start: 2022-10-19 | End: 2022-10-19

## 2022-10-19 RX ORDER — CEFAZOLIN SODIUM 2 G/50ML
2 SOLUTION INTRAVENOUS
Status: COMPLETED | OUTPATIENT
Start: 2022-10-19 | End: 2022-10-19

## 2022-10-19 RX ORDER — MIDAZOLAM HYDROCHLORIDE 1 MG/ML
INJECTION INTRAMUSCULAR; INTRAVENOUS
Status: DISCONTINUED | OUTPATIENT
Start: 2022-10-19 | End: 2022-10-19

## 2022-10-19 RX ORDER — LIDOCAINE HYDROCHLORIDE 20 MG/ML
JELLY TOPICAL
Status: DISCONTINUED | OUTPATIENT
Start: 2022-10-19 | End: 2022-10-19 | Stop reason: HOSPADM

## 2022-10-19 RX ORDER — BUPIVACAINE HYDROCHLORIDE AND EPINEPHRINE 5; 5 MG/ML; UG/ML
INJECTION, SOLUTION EPIDURAL; INTRACAUDAL; PERINEURAL
Status: DISCONTINUED
Start: 2022-10-19 | End: 2022-10-19 | Stop reason: HOSPADM

## 2022-10-19 RX ORDER — PROPOFOL 10 MG/ML
INJECTION, EMULSION INTRAVENOUS
Status: DISCONTINUED | OUTPATIENT
Start: 2022-10-19 | End: 2022-10-19

## 2022-10-19 RX ORDER — BUPIVACAINE HYDROCHLORIDE AND EPINEPHRINE 5; 5 MG/ML; UG/ML
INJECTION, SOLUTION EPIDURAL; INTRACAUDAL; PERINEURAL
Status: DISCONTINUED | OUTPATIENT
Start: 2022-10-19 | End: 2022-10-19 | Stop reason: HOSPADM

## 2022-10-19 RX ORDER — LIDOCAINE HCL/PF 100 MG/5ML
SYRINGE (ML) INTRAVENOUS
Status: DISCONTINUED | OUTPATIENT
Start: 2022-10-19 | End: 2022-10-19

## 2022-10-19 RX ORDER — HYDROCODONE BITARTRATE AND ACETAMINOPHEN 5; 325 MG/1; MG/1
1 TABLET ORAL EVERY 4 HOURS PRN
Status: DISCONTINUED | OUTPATIENT
Start: 2022-10-19 | End: 2022-10-19 | Stop reason: HOSPADM

## 2022-10-19 RX ORDER — KETOROLAC TROMETHAMINE 30 MG/ML
INJECTION, SOLUTION INTRAMUSCULAR; INTRAVENOUS
Status: DISCONTINUED | OUTPATIENT
Start: 2022-10-19 | End: 2022-10-19

## 2022-10-19 RX ORDER — ACETAMINOPHEN 10 MG/ML
INJECTION, SOLUTION INTRAVENOUS
Status: DISCONTINUED | OUTPATIENT
Start: 2022-10-19 | End: 2022-10-19

## 2022-10-19 RX ORDER — FENTANYL CITRATE 50 UG/ML
INJECTION, SOLUTION INTRAMUSCULAR; INTRAVENOUS
Status: DISCONTINUED | OUTPATIENT
Start: 2022-10-19 | End: 2022-10-19

## 2022-10-19 RX ADMIN — FENTANYL CITRATE 50 MCG: 50 INJECTION, SOLUTION INTRAMUSCULAR; INTRAVENOUS at 09:10

## 2022-10-19 RX ADMIN — ONDANSETRON 8 MG: 2 INJECTION, SOLUTION INTRAMUSCULAR; INTRAVENOUS at 08:10

## 2022-10-19 RX ADMIN — FENTANYL CITRATE 50 MCG: 50 INJECTION, SOLUTION INTRAMUSCULAR; INTRAVENOUS at 08:10

## 2022-10-19 RX ADMIN — MIDAZOLAM HYDROCHLORIDE 2 MG: 1 INJECTION, SOLUTION INTRAMUSCULAR; INTRAVENOUS at 10:10

## 2022-10-19 RX ADMIN — ACETAMINOPHEN 1000 MG: 10 INJECTION, SOLUTION INTRAVENOUS at 08:10

## 2022-10-19 RX ADMIN — DEXAMETHASONE SODIUM PHOSPHATE 8 MG: 4 INJECTION, SOLUTION INTRAMUSCULAR; INTRAVENOUS at 08:10

## 2022-10-19 RX ADMIN — PROPOFOL 200 MG: 10 INJECTION, EMULSION INTRAVENOUS at 08:10

## 2022-10-19 RX ADMIN — CEFAZOLIN SODIUM 300 G: 2 SOLUTION INTRAVENOUS at 09:10

## 2022-10-19 RX ADMIN — MIDAZOLAM HYDROCHLORIDE 3 MG: 1 INJECTION, SOLUTION INTRAMUSCULAR; INTRAVENOUS at 08:10

## 2022-10-19 RX ADMIN — SODIUM CHLORIDE: 0.9 INJECTION, SOLUTION INTRAVENOUS at 10:10

## 2022-10-19 RX ADMIN — LIDOCAINE HYDROCHLORIDE 80 MG: 20 INJECTION, SOLUTION INTRAVENOUS at 08:10

## 2022-10-19 RX ADMIN — CEFAZOLIN SODIUM 2 G: 2 SOLUTION INTRAVENOUS at 08:10

## 2022-10-19 RX ADMIN — KETOROLAC TROMETHAMINE 30 MG: 30 INJECTION, SOLUTION INTRAMUSCULAR at 09:10

## 2022-10-19 RX ADMIN — MIDAZOLAM HYDROCHLORIDE 1 MG: 1 INJECTION, SOLUTION INTRAMUSCULAR; INTRAVENOUS at 09:10

## 2022-10-19 RX ADMIN — SODIUM CHLORIDE, SODIUM LACTATE, POTASSIUM CHLORIDE, AND CALCIUM CHLORIDE: .6; .31; .03; .02 INJECTION, SOLUTION INTRAVENOUS at 08:10

## 2022-10-19 NOTE — BRIEF OP NOTE
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    DISPOSITION: Home or Self Care    FINAL DIAGNOSIS:  Perianal fistula    FOLLOWUP: In clinic    DISCHARGE INSTRUCTIONS:    Discharge Procedure Orders   Diet general     Call MD for:  extreme fatigue     Call MD for:  persistent dizziness or light-headedness     Call MD for:  hives     Call MD for:  redness, tenderness, or signs of infection (pain, swelling, redness, odor or green/yellow discharge around incision site)     Call MD for:  difficulty breathing, headache or visual disturbances     Call MD for:  severe uncontrolled pain     Call MD for:  persistent nausea and vomiting     Call MD for:  temperature >100.4     No dressing needed     Wound care routine (specify)   Order Comments: Sitz baths BID and after every BM     Activity as tolerated

## 2022-10-19 NOTE — PLAN OF CARE
Problem: Adult Inpatient Plan of Care  Goal: Plan of Care Review  Outcome: Ongoing, Progressing  Goal: Patient-Specific Goal (Individualized)  Outcome: Ongoing, Progressing  Goal: Absence of Hospital-Acquired Illness or Injury  Outcome: Ongoing, Progressing  Goal: Optimal Comfort and Wellbeing  Outcome: Ongoing, Progressing  Goal: Readiness for Transition of Care  Outcome: Ongoing, Progressing     Problem: Impaired Wound Healing  Goal: Optimal Wound Healing  Outcome: Ongoing, Progressing     Problem: Fall Injury Risk  Goal: Absence of Fall and Fall-Related Injury  Outcome: Ongoing, Progressing     Problem: Pain Acute  Goal: Acceptable Pain Control and Functional Ability  Outcome: Ongoing, Progressing

## 2022-10-19 NOTE — NURSING
Patient assisted up on stretcher. Assisted to bathroom. Unable to void at present. Patient denies urge to urinate. No bladder distension. Perineum cleansed with warm cloth. Assisted back to bed. Call bell in reach.

## 2022-10-19 NOTE — ANESTHESIA PREPROCEDURE EVALUATION
10/19/2022  Sheila Urbina is a 22 y.o., female.    Pre-op Assessment    I have reviewed the Patient Summary Reports.    I have reviewed the Nursing Notes. I have reviewed the NPO Status.   I have reviewed the Medications.     Review of Systems  Anesthesia Hx:  No problems with previous Anesthesia  History of prior surgery of interest to airway management or planning:  Denies Personal Hx of Anesthesia complications.   Social:  Non-Smoker, No Alcohol Use    Hematology/Oncology:  Hematology Normal   Oncology Normal     EENT/Dental:EENT/Dental Normal   Cardiovascular:   Exercise tolerance: good Hypertension, well controlled    Pulmonary:   Asthma mild    Renal/:  Renal/ Normal     Hepatic/GI:   Perirectal fistula   Musculoskeletal:  Musculoskeletal Normal    Neurological:  Neurology Normal    Endocrine:  Endocrine Normal    Dermatological:  Skin Normal    Psych:   Psychiatric History anxiety depression          Physical Exam  General:  Obesity, Well nourished, Cooperative and Alert      Airway/Jaw/Neck:  Mouth Opening: Normal   Tongue: Normal   Mallampati: III Improves to II with phonation.  TM Distance: Normal   Neck ROM: Normal ROM       Dental:  Intact               Anesthesia Plan  Type of Anesthesia, risks & benefits discussed:  Anesthesia Type:  Gen Supraglottic Airway, Gen ETT    Patient's Preference:   Plan Factors:          Intra-op Monitoring Plan: standard ASA monitors  Intra-op Monitoring Plan Comments:   Post Op Pain Control Plan: multimodal analgesia  Post Op Pain Control Plan Comments:     Induction:   IV  Beta Blocker:  Patient is not currently on a Beta-Blocker (No further documentation required).       Informed Consent: Informed consent signed with the Patient and all parties understand the risks and agree with anesthesia plan.  All questions answered.  Anesthesia consent signed with  patient.  ASA Score: 3     Day of Surgery Review of History & Physical: I have interviewed and examined the patient. I have reviewed the patient's H&P dated: 10/19/22.    H&P Update referred to the surgeon/provider.          Ready For Surgery From Anesthesia Perspective.           Physical Exam  General: Obesity, Well nourished, Cooperative and Alert    Airway:  Mallampati: III / II  Mouth Opening: Normal  TM Distance: Normal  Tongue: Normal  Neck ROM: Normal ROM    Dental:  Intact          Anesthesia Plan  Type of Anesthesia, risks & benefits discussed:    Anesthesia Type: Gen Supraglottic Airway, Gen ETT  Intra-op Monitoring Plan: standard ASA monitors  Post Op Pain Control Plan: multimodal analgesia  Induction:  IV  Airway Plan: Direct, Post-Induction  Informed Consent: Informed consent signed with the Patient and all parties understand the risks and agree with anesthesia plan.  All questions answered. Patient consented to blood products? Yes  ASA Score: 3  Day of Surgery Review of History & Physical: H&P Update referred to the surgeon/provider.I have interviewed and examined the patient. I have reviewed the patient's H&P dated: 10/19/22. There are no significant changes.     Ready For Surgery From Anesthesia Perspective.       .

## 2022-10-19 NOTE — H&P
[facility] - Surgery  History & Physical  General Surgery    SUBJECTIVE:     Chief Complaint: anal pain    History of Present Illness:    Patient is a 22 y.o. female with previous h/o perianal and perineal abscess presented to clinic with recurrent pain and drainage.  She was found to have a perianal draining sinus worrisome for possible anal fistula and is presenting for EUA.     PTA Medications   Medication Sig    amlodipine-benazepril 5-20 mg (LOTREL) 5-20 mg per capsule Take 1 capsule by mouth once daily.    FLUoxetine 40 MG capsule Prozac 40 mg capsule   Take 1 capsule every day by oral route.    loratadine (CLARITIN) 10 mg tablet Take 10 mg by mouth once daily.    norethindrone-e.estradiol-iron (LOESTRIN 24 FE ORAL) Take by mouth.    spironolactone (ALDACTONE) 25 MG tablet TAKE 1 TABLET(25 MG) BY MOUTH EVERY DAY    albuterol (PROVENTIL/VENTOLIN HFA) 90 mcg/actuation inhaler INHALE  2 PUFFS INTO THE LUNGS EVERY 4 TO 6 HOURS AS NEEDED FOR WHEEZING    diclofenac sodium (VOLTAREN) 1 % Gel Apply 2 g topically 4 (four) times daily.    fluticasone propionate (FLONASE) 50 mcg/actuation nasal spray SHAKE LIQUID AND USE 1 SPRAY(50 MCG) IN EACH NOSTRIL TWICE DAILY AS NEEDED FOR RHINITIS OR ALLERGIES    nebulizer accessories Kit 3 neb kits per month for 12 months       Review of patient's allergies indicates:   Allergen Reactions    Oxycontin [oxycodone]      Nightmares       Past Medical History:   Diagnosis Date    Aggressive behavior     post anesthesia    Essential hypertension 4/12/2021    JAN (generalized anxiety disorder) 9/18/2020    Granuloma of skin     left lower eye lid at age 4    Hypertension     Mild intermittent asthma without complication 9/18/2020    Obesity 5/7/2018    Ovarian cyst 2013    Recurrent dislocation of left patella     Vision abnormalities      Past Surgical History:   Procedure Laterality Date    EYE SURGERY      granuloma removal under left eye    INCISION AND DRAINAGE OF PERIRECTAL REGION  N/A 12/1/2021    Procedure: INCISION AND DRAINAGE, PERIRECTAL REGION;  Surgeon: Nehemias Torres MD;  Location: [facility];  Service: General;  Laterality: N/A;    KNEE ARTHROSCOPY      Osteotomy of tibia      PATELLA REALIGNMENT       Family History   Problem Relation Age of Onset    Diabetes Mother     Seizures Father      Social History     Tobacco Use    Smoking status: Never    Smokeless tobacco: Never   Substance Use Topics    Alcohol use: No    Drug use: No        Review of Systems:  Constitutional: no fever or chills  Eyes: no visual changes  ENT: no nasal congestion or sore throat  Respiratory: no cough or shortness of breath  Cardiovascular: no chest pain or palpitations  Gastrointestinal: no nausea or vomiting, tolerating diet  Musculoskeletal: no arthralgias or myalgias  Neurological: no seizures or tremors    OBJECTIVE:     Vital Signs (Most Recent):  Temp: 96.9 °F (36.1 °C) (10/19/22 0720)  Pulse: 75 (10/19/22 0720)  Resp: 18 (10/19/22 0720)  BP: 115/82 (10/19/22 0720)  SpO2: 99 % (10/19/22 0720)    Physical Exam:  NAD  A&Ox3, GCS 15  NCAT  PERRL  RRR  CTAb  Soft, NDNT, +BS  No C/C/E, MAEW    Laboratory:  none    Diagnostic Results:  none    ASSESSMENT/PLAN:      22 y.o. female with anal fistula    Plan: proceed with EUA

## 2022-10-19 NOTE — ANESTHESIA PROCEDURE NOTES
Intubation    Date/Time: 10/19/2022 8:49 AM  Performed by: August Padgett CRNA  Authorized by: August Padgett CRNA     Intubation:     Induction:  Intravenous    Intubated:  Postinduction    Mask Ventilation:  N/a    Attempts:  1    Attempted By:  CRNA    Difficult Airway Encountered?: No      Complications:  None    Airway Device:  Supraglottic airway/LMA    Airway Device Size:  3.5    Style/Cuff Inflation:  Cuffed (inflated to minimal occlusive pressure)    Secured at:  The lips    Placement Verified By:  Capnometry    Complicating Factors:  None    Findings Post-Intubation:  BS equal bilateral and atraumatic/condition of teeth unchanged

## 2022-10-19 NOTE — NURSING
Patient discharged home. Reviewed AVS. RX called to [facility]. Donut ring given for perineum support/comfort. Patient instructed to call MD or come back to ED if unable to void in 8 hours. Voiced understanding. In agreement for discharge. Mother at bedside.

## 2022-10-19 NOTE — TRANSFER OF CARE
Anesthesia Transfer of Care Note    Patient: Sheila Urbina    Procedure(s) Performed: Procedure(s) (LRB):  RECTAL EXAM UNDER ANESTHESIA (N/A)    Patient location: PACU    Anesthesia Type: general    Transport from OR: Transported from OR on 6-10 L/min O2 by face mask with adequate spontaneous ventilation    Post pain: adequate analgesia    Post assessment: no apparent anesthetic complications and tolerated procedure well    Post vital signs: stable    Level of consciousness: sedated    Nausea/Vomiting: no nausea/vomiting    Complications: none    Transfer of care protocol was followed      Last vitals:   Visit Vitals  /80 (BP Location: Right arm, Patient Position: Lying)   Pulse 93   Temp 36.1 °C (96.9 °F) (Tympanic)   Resp 18   SpO2 (!) 94%   Breastfeeding No

## 2022-10-19 NOTE — OP NOTE
[facility] - Surgery  Surgery Department  Operative Note    SUMMARY     Date of Procedure: 10/19/2022     Procedure: Procedure(s) (LRB):  RECTAL EXAM UNDER ANESTHESIA (N/A)     Surgeon(s) and Role:     * Nehemias Torres MD - Primary    Assisting Surgeon: None    Pre-Operative Diagnosis: Perianal fistula [K60.3]    Post-Operative Diagnosis: Post-Op Diagnosis Codes:     * Perianal fistula [K60.3]    Anesthesia: General    Estimated Blood Loss (EBL): *10cc           Operative Findings:  Anal fistula involving only approx 10% of the external sphincter treated with fistulotomy    Indications for Procedure:  22-year-old female with history of perianal abscesses found to have a recurrence and likely fistula.  She is presenting for planned examination under anesthesia and potential treatment.    Procedure in Detail: After informed consent was obtained, the pt was taking to the operating room, placed supine on the operating table, and administered General anesthesia. The patient was also administered the appropriate pre-operative antibiotics, had SCDs in place, was placed in the high lithotomy position, and then was prepped/draped in the usual sterile fashion.  Visual inspection of the perianal area showed there to be an external fistula opening at approximately 10 o'clock position.  Digital rectal exam was performed and found to be within normal limits.  The anoscope was then obtained and placed in the anal canal and visual inspection for potential fistula site was undertaken.  I was unable to determine then obvious fistula site from the 9:00 a.m. to the 12 o'clock position.  A 20 gauge Angiocath and syringe were then filled with hydrogen peroxide and the fistula exit site injected.  Bubbles could be seen entering at the 11 o'clock position in the anal canal distal to the dentate line.  A probe was then used to identify the fistula track and was able to successfully cannulate the internal opening.  Assessment of the sphincter  complex allowed for identification of this being a simple fistula traversing approximately 10% of the external sphincter and internal sphincter complex.  I elected to perform a fistulotomy at this time.  The tissues were divided over the probe with electrocautery.  Visual inspection confirmed that this was only approximately 10% of the external sphincter muscle which was divided.  The fistula tract was then curetted and debrided with removal of all mucoid material.  I then elected to marsupialize the fistulotomy by securing the sidewalls to the perianal tissue with running 3-0 chromic sutures.  Hemostasis was then obtained with electrocautery.  A lidocaine soaked Gelfoam was placed in the anal canal to aid in postoperative hemostasis and analgesia.  The subcutaneous tissues were then instilled with Marcaine.  Pt tolerated the procedure well and was transferred to the recovery room in stable condition.  The lap and instrument counts were correct at the end of the procedure.  This dictation was performed with voice recognition software and may contain errors.      Implants: * No implants in log *    Specimens:   Specimen (24h ago, onward)      None

## 2022-10-19 NOTE — NURSING
Patients mother called stating that they are unable to fill patients prescription per [facility] because they need a prior authorization from the insurance. The pharmacist stated that 30 tablets were ordered and the insurance will only approve 28.     Dr. Torres notified and gave ok to change quantity to 28 tablets. Pharmacist stated that insurance approved prescription at 28 tablets.    If patient needs further pain medication, MD will have to write a prescription for more. MD is aware of this.     Patients mother notified that medications are now able to be filled. Patients mother educated that if patient needs more medication or if patients pain is not properly controlled, they should contact MD.

## 2022-10-19 NOTE — NURSING
Edelmira patient's Mother and Aunt Rachel is at patient's bedside. Call bell given to Mom at bedside. Will continue to monitor.

## 2022-10-19 NOTE — ANESTHESIA POSTPROCEDURE EVALUATION
Anesthesia Post Evaluation    Patient: Sheila Urbina    Procedure(s) Performed: Procedure(s) (LRB):  RECTAL EXAM UNDER ANESTHESIA (N/A)    Final Anesthesia Type: general      Patient location during evaluation: PACU  Patient participation: Yes- Able to Participate  Level of consciousness: awake and alert and oriented  Post-procedure vital signs: reviewed and stable  Pain management: adequate  Airway patency: patent    PONV status at discharge: No PONV  Anesthetic complications: no      Cardiovascular status: blood pressure returned to baseline and hemodynamically stable  Respiratory status: unassisted, spontaneous ventilation and room air  Hydration status: euvolemic  Follow-up not needed.          Vitals Value Taken Time   /82 10/19/22 1017   Temp 36.2 °C (97.1 °F) 10/19/22 0957   Pulse 80 10/19/22 1017   Resp 18 10/19/22 1017   SpO2 96 % 10/19/22 1017         No case tracking events are documented in the log.      Pain/Jose Score: Jose Score: 10 (10/19/2022 10:07 AM)

## 2023-02-08 DIAGNOSIS — I10 ESSENTIAL HYPERTENSION: ICD-10-CM

## 2023-02-08 RX ORDER — SPIRONOLACTONE 25 MG/1
TABLET ORAL
Qty: 30 TABLET | Refills: 0 | Status: SHIPPED | OUTPATIENT
Start: 2023-02-08 | End: 2023-03-16

## 2023-02-08 NOTE — TELEPHONE ENCOUNTER
Spoke with pt informed of medication being sent to pharmacy. Pt voiced understanding. Also informed pt for any future refills need to be seen in clinic per Lissett Yepez Np. Pt voiced understanding. Appointments scheduled and mailed out.

## 2023-02-15 ENCOUNTER — TELEPHONE (OUTPATIENT)
Dept: ORTHOPEDICS | Facility: CLINIC | Age: 23
End: 2023-02-15
Payer: MEDICAID

## 2023-02-15 NOTE — LETTER
02/15/2023                 [facility] - [facility]  [address], [address]  [place] [address]  Phone: [phone]  Fax: [phone]   02/15/2023    Patient: Sheila Urbina   YOB: 2000       To Whom it May Concern:    I have seen Sheila Urbina for chronic left knee pain. She has a history of multiple surgeries on her left knee. Because of this, she has difficulty with stairs.     Please allow her to have a dorm room on the first floor.     If you have any questions or concerns, please don't hesitate to call.    Sincerely,     Selina Mckeon PA-C

## 2023-02-15 NOTE — TELEPHONE ENCOUNTER
Spoke to pt. she was informed that her letter was sent to her patient portal and that we can not email it to her but she can print it off her portal. Patient replied. Ok Thank You

## 2023-02-15 NOTE — TELEPHONE ENCOUNTER
----- Message from Catrina Lozano MA sent at 2/15/2023 12:38 PM CST -----  Contact: pt    ----- Message -----  From: Ciro Elliott  Sent: 2/15/2023   9:32 AM CST  To: Linda Marks Staff    .Type:  Needs Medical Advice    Who Called: pt  Would the patient rather a call back or a response via [facility]?  Call back  Best Call Back Number: [phone]  Additional Information:  Pt. Is calling regarding get the provider to write her letter for school dorm room that she can not climb stairs and needs a 1 st floor dorm room. Pt. Is requesting that the letter be sent to her email: [email or web address]

## 2023-03-06 ENCOUNTER — HOSPITAL ENCOUNTER (EMERGENCY)
Facility: HOSPITAL | Age: 23
Discharge: HOME | End: 2023-03-06
Attending: SURGERY
Payer: MEDICAID

## 2023-03-06 DIAGNOSIS — M25.562 LEFT KNEE PAIN: ICD-10-CM

## 2023-03-06 DIAGNOSIS — M25.569 KNEE PAIN: ICD-10-CM

## 2023-03-06 PROCEDURE — 63600175 PHARM REV CODE 636 W HCPCS: Performed by: SURGERY

## 2023-03-06 PROCEDURE — 96372 THER/PROPH/DIAG INJ SC/IM: CPT | Performed by: SURGERY

## 2023-03-06 PROCEDURE — 99284 EMERGENCY DEPT VISIT MOD MDM: CPT

## 2023-03-06 RX ORDER — CYCLOBENZAPRINE HCL 10 MG
10 TABLET ORAL 3 TIMES DAILY PRN
Qty: 10 TABLET | Refills: 0 | Status: SHIPPED | OUTPATIENT
Start: 2023-03-06 | End: 2023-03-11

## 2023-03-06 RX ORDER — KETOROLAC TROMETHAMINE 30 MG/ML
60 INJECTION, SOLUTION INTRAMUSCULAR; INTRAVENOUS
Status: COMPLETED | OUTPATIENT
Start: 2023-03-06 | End: 2023-03-06

## 2023-03-06 RX ORDER — IBUPROFEN 800 MG/1
800 TABLET ORAL EVERY 6 HOURS PRN
Qty: 20 TABLET | Refills: 0 | Status: SHIPPED | OUTPATIENT
Start: 2023-03-06 | End: 2023-05-01

## 2023-03-06 RX ADMIN — KETOROLAC TROMETHAMINE 60 MG: 30 INJECTION, SOLUTION INTRAMUSCULAR at 09:03

## 2023-03-06 NOTE — Clinical Note
"Sheila Urbinay" Ciara was seen and treated in our emergency department on 3/6/2023.  She may return to school on 03/08/2023.      If you have any questions or concerns, please don't hesitate to call.      Guido Mcgee MD"

## 2023-03-07 VITALS
OXYGEN SATURATION: 98 % | DIASTOLIC BLOOD PRESSURE: 99 MMHG | TEMPERATURE: 98 F | HEART RATE: 83 BPM | WEIGHT: 200.63 LBS | BODY MASS INDEX: 33.38 KG/M2 | SYSTOLIC BLOOD PRESSURE: 138 MMHG | RESPIRATION RATE: 18 BRPM

## 2023-03-07 NOTE — ED PROVIDER NOTES
"Encounter Date: 3/6/2023       History     Chief Complaint   Patient presents with    Knee Pain     Pt felt a "pop" getting out of bed this AM in her left knee.  Pt has Pre-existing knee history, Pt states multiple surgeries.  Pain on palpation below left knee.       Sheila Urbina is a 22 y.o. female now presents with left knee pain today  She has had 5 left knee surgeries by several physicians at [facility]  Patient states she felt a pop in her left knee today with immediate pain afterwards  Previous surgical scars noted on left knee with no gross deformity or instability  Good distal pulses capillary refill, calf tenderness noted on ER evaluation today  (-) Homans sign, (-) anterior-posterior drawer sign on ER assessment tonight    Review of patient's allergies indicates:   Allergen Reactions    Oxycontin [oxycodone]      Nightmares     Past Medical History:   Diagnosis Date    Aggressive behavior     post anesthesia    Essential hypertension 4/12/2021    JAN (generalized anxiety disorder) 9/18/2020    Granuloma of skin     left lower eye lid at age 4    Hypertension     Mild intermittent asthma without complication 9/18/2020    Obesity 5/7/2018    Ovarian cyst 2013    Recurrent dislocation of left patella     Vision abnormalities      Past Surgical History:   Procedure Laterality Date    EXAMINATION UNDER ANESTHESIA N/A 10/19/2022    Procedure: RECTAL EXAM UNDER ANESTHESIA;  Surgeon: Nehemias Torres MD;  Location: [facility] OR;  Service: General;  Laterality: N/A;    EYE SURGERY      granuloma removal under left eye    INCISION AND DRAINAGE OF PERIRECTAL REGION N/A 12/1/2021    Procedure: INCISION AND DRAINAGE, PERIRECTAL REGION;  Surgeon: Nehemias Torres MD;  Location: [facility] OR;  Service: General;  Laterality: N/A;    KNEE ARTHROSCOPY      Osteotomy of tibia      PATELLA REALIGNMENT       Family History   Problem Relation Age of Onset    Diabetes Mother     Seizures Father      Social History "     Tobacco Use    Smoking status: Never    Smokeless tobacco: Never   Substance Use Topics    Alcohol use: No    Drug use: No     Review of Systems   Constitutional: Negative.    HENT: Negative.     Eyes: Negative.    Respiratory: Negative.     Cardiovascular: Negative.    Gastrointestinal: Negative.    Genitourinary: Negative.    Musculoskeletal: Negative.         (+) left knee pain   Skin: Negative.    Neurological: Negative.    Psychiatric/Behavioral: Negative.       Physical Exam     Initial Vitals [03/06/23 2035]   BP Pulse Resp Temp SpO2   (!) 178/105 80 18 98.1 °F (36.7 °C) 99 %      MAP       --         Physical Exam    Nursing note and vitals reviewed.  Constitutional: Vital signs are normal. She appears well-developed and well-nourished. She is cooperative.   HENT:   Head: Normocephalic and atraumatic.   Right Ear: External ear normal.   Left Ear: External ear normal.   Nose: Nose normal.   Mouth/Throat: Oropharynx is clear and moist.   Eyes: Conjunctivae, EOM and lids are normal. Pupils are equal, round, and reactive to light.   Neck: Trachea normal and phonation normal. Neck supple. No JVD present.   Normal range of motion.   Full passive range of motion without pain.     Cardiovascular:  Normal rate, regular rhythm, S1 normal, S2 normal, normal heart sounds, intact distal pulses and normal pulses.           Pulmonary/Chest: Effort normal and breath sounds normal.   Abdominal: Abdomen is soft and flat. Bowel sounds are normal.   Musculoskeletal:         General: Normal range of motion.      Cervical back: Full passive range of motion without pain, normal range of motion and neck supple.     Neurological: She is alert and oriented to person, place, and time. She has normal strength.   Skin: Skin is warm, dry and intact. Capillary refill takes less than 2 seconds.       ED Course   Procedures  Labs Reviewed - No data to display       Imaging Results              X-Ray Knee 1 or 2 View Left (Final result)   Result time 03/06/23 21:27:16      Final result by Sarbjit Thornton MD (03/06/23 21:27:16)                   Impression:      1.  No acute process.    2.  Postoperative changes as above.      Electronically signed by: Sarbjit Thornton MD  Date:    03/06/2023  Time:    21:27               Narrative:    EXAMINATION:  XR KNEE 1 OR 2 VIEW LEFT; XR TIBIA FIBULA 2 VIEW LEFT    CLINICAL HISTORY:  Pain in unspecified knee; Pain in left knee    TECHNIQUE:  Two x-ray views of the left knee.    Frontal and lateral radiographs of the left tibia/fibula.    COMPARISON:  09/20/2022.    FINDINGS:  There are stable postoperative findings of open reduction internal fixation of the distal aspect of the left femur.  The hardware is intact.  No periprosthetic lucency is identified.  There is also stable appearance of two screws transfixing the proximal tibia metadiaphysis.    The joint spaces are maintained.  No joint effusion is present.  The soft tissues are unremarkable.  No radiopaque foreign body is identified.    There is no evidence of a fracture or dislocation.                                       X-Ray Tibia Fibula 2 View Left (Final result)  Result time 03/06/23 21:27:16      Final result by Sarbjit Thornton MD (03/06/23 21:27:16)                   Impression:      1.  No acute process.    2.  Postoperative changes as above.      Electronically signed by: Sarbjit Thornton MD  Date:    03/06/2023  Time:    21:27               Narrative:    EXAMINATION:  XR KNEE 1 OR 2 VIEW LEFT; XR TIBIA FIBULA 2 VIEW LEFT    CLINICAL HISTORY:  Pain in unspecified knee; Pain in left knee    TECHNIQUE:  Two x-ray views of the left knee.    Frontal and lateral radiographs of the left tibia/fibula.    COMPARISON:  09/20/2022.    FINDINGS:  There are stable postoperative findings of open reduction internal fixation of the distal aspect of the left femur.  The hardware is intact.  No periprosthetic lucency is identified.  There is also stable appearance of two  screws transfixing the proximal tibia metadiaphysis.    The joint spaces are maintained.  No joint effusion is present.  The soft tissues are unremarkable.  No radiopaque foreign body is identified.    There is no evidence of a fracture or dislocation.                                       Medications   ketorolac injection 60 mg (60 mg Intramuscular Given 3/6/23 2138)     Medical Decision Makin-year-old female presents with left knee pain on ER evaluation today  Patient with (-) x-rays in the emergency room several previous surgeries  Knee immobilizer & crutches with nonweightbearing until orthopedic FU  Ambulatory referral to the Orthopedic locally for full evaluation on DC   Motrin & Flexeril for pain; ice & elevated home, return with any concerns                        Clinical Impression:   Final diagnoses:  [M25.569] Knee pain  [M25.562] Left knee pain        ED Disposition Condition    Discharge Stable          ED Prescriptions       Medication Sig Dispense Start Date End Date Auth. Provider    ibuprofen (ADVIL,MOTRIN) 800 MG tablet Take 1 tablet (800 mg total) by mouth every 6 (six) hours as needed for Pain. 20 tablet 3/6/2023 -- Guido Mcgee MD    cyclobenzaprine (FLEXERIL) 10 MG tablet Take 1 tablet (10 mg total) by mouth 3 (three) times daily as needed for Muscle spasms. 10 tablet 3/6/2023 3/11/2023 Guido Mcgee MD          Follow-up Information       Follow up With Specialties Details Why Contact Info    Kell Neff PA-C Orthopedic Surgery Go in 2 days  [address] [place] [id number]  [phone]               Guido Mcgee MD  [phone]

## 2023-03-08 ENCOUNTER — PATIENT MESSAGE (OUTPATIENT)
Dept: ORTHOPEDICS | Facility: CLINIC | Age: 23
End: 2023-03-08
Payer: MEDICAID

## 2023-03-09 NOTE — TELEPHONE ENCOUNTER
Yes, we did receive referral but she is already under the care of another orthopedic provider for this problem so she should continue to be seen there.

## 2023-03-16 DIAGNOSIS — I10 ESSENTIAL HYPERTENSION: ICD-10-CM

## 2023-03-16 RX ORDER — SPIRONOLACTONE 25 MG/1
TABLET ORAL
Qty: 30 TABLET | Refills: 1 | Status: SHIPPED | OUTPATIENT
Start: 2023-03-16 | End: 2023-05-01

## 2023-03-16 NOTE — TELEPHONE ENCOUNTER
Attempted to contact pt with no answer. Medication refill sent to pharmacy. Pt needs an appt for any future refills.

## 2023-04-28 NOTE — PROGRESS NOTES
Subjective:      Patient ID: Sheila Urbina is a 22 y.o. female.    Chief Complaint: No chief complaint on file.      HPI  (George)    She has complex surgical history as listed below.   8/2013 MPFL reconstruction  2/2015 Jasper osteotomy  8/2017 distal femur varus-producing osteotomy.  Knee scope. (Patellar chondromalacia noted on scope)    Last seen by me on 9/20/22 for acute trauma to knee at the end of July 2022 when she fell down a hill with left leg bent behind her. MRI of left knee on 9/27/22 showed postoperative changes involving the distal femur and proximal tibia as well as the patella with tace amount of fluid in the popliteal fossa.    MRI reviewed with Dr. Vazquez at that time and he recommended continued PT and possible euflexxa injections.     Seen in ED on 3/6/23 with acute pain in left knee with acute pain after feeling a snap/pop in her knee . Since that time, she had constant pain in left knee that is worse after getting up from sitting. She has locking, catching, and giving way. She rates her pain as a 9 on a scale of 1-10. Pain is constant throbbing.     She has been on multiple NSAIDs in the past without relief, including voltaren gel. Last time she did PT was last fall (did it for 2 months) and it did not help. No relief with euflexxa injections back in 2019.       Past Medical History:   Diagnosis Date    Aggressive behavior     post anesthesia    Essential hypertension 4/12/2021    JAN (generalized anxiety disorder) 9/18/2020    Granuloma of skin     left lower eye lid at age 4    Hypertension     Mild intermittent asthma without complication 9/18/2020    Obesity 5/7/2018    Ovarian cyst 2013    Recurrent dislocation of left patella     Vision abnormalities          Current Outpatient Medications:     albuterol (PROVENTIL/VENTOLIN HFA) 90 mcg/actuation inhaler, INHALE  2 PUFFS INTO THE LUNGS EVERY 4 TO 6 HOURS AS NEEDED FOR WHEEZING, Disp: 18 g, Rfl: 5     norethindrone-e.estradiol-iron (LOESTRIN 24 FE ORAL), Take by mouth., Disp: , Rfl:     methocarbamoL (ROBAXIN) 500 MG Tab, Take 1 tablet (500 mg total) by mouth 3 (three) times daily as needed (muscle spasms)., Disp: 30 tablet, Rfl: 0    Review of patient's allergies indicates:   Allergen Reactions    Oxycontin [oxycodone]      Nightmares       Review of Systems   Constitutional: Negative for chills, fever, night sweats and weight gain.   Gastrointestinal:  Negative for bowel incontinence, nausea and vomiting.   Genitourinary:  Negative for bladder incontinence.   Neurological:  Negative for disturbances in coordination and loss of balance.         Objective:        Wt 95.3 kg (210 lb 1.6 oz)   BMI 34.96 kg/m²     Ortho/SPM Exam    Body habitus is normal.   The patient walks with a limp.    LEFT KNEE EXAM:  Resisted SLR negative.   The skin over the knee is  has multiple healed scars .  Knee effusion not significant   Tendernes is located lateral   Range of motion- Flexion 90 deg with pain, Extension 5 deg,     Ligament exam:   MCL intact   Lachman intact              Post sag intact    LCL intact    No gross instability of knee, but this causes pain.     Patellar apprehension negative.  Popliteal cyst negative  Patellar crepitation absent.  Flexion/pinch (McMurrays) positive for pain    Motor normal 5/5 strength in all tested muscle groups.   Sensory normal.      XRAY INTERPRETATION:  X-rays of left knee (non WB) dated 3/6/23 are personally reviewed and show hardware intact in left knee with reasonable maintenance of joint space.         Assessment:       Encounter Diagnoses   Name Primary?    History of left knee surgery     Acute pain of left knee Yes    Instability of left knee joint               Plan:       Diagnoses and all orders for this visit:    Acute pain of left knee  -     methocarbamoL (ROBAXIN) 500 MG Tab; Take 1 tablet (500 mg total) by mouth 3 (three) times daily as needed (muscle spasms).  -      X-ray Knee Ortho Bilateral with Flexion; Future  -     MRI Knee Without Contrast Left; Future    History of left knee surgery  -     methocarbamoL (ROBAXIN) 500 MG Tab; Take 1 tablet (500 mg total) by mouth 3 (three) times daily as needed (muscle spasms).  -     X-ray Knee Ortho Bilateral with Flexion; Future  -     MRI Knee Without Contrast Left; Future    Instability of left knee joint  -     methocarbamoL (ROBAXIN) 500 MG Tab; Take 1 tablet (500 mg total) by mouth 3 (three) times daily as needed (muscle spasms).  -     X-ray Knee Ortho Bilateral with Flexion; Future  -     MRI Knee Without Contrast Left; Future          Complex history with multiple left knee surgeries as above.     She had acute injury to the knee on 3/6/23 when she felt a snap/pop in her left knee . Since that time, she has constant pain in left knee that is worse after getting up from sitting. She has locking, catching, and giving way.    XRs of right knee from ED (non WB) showed hardware in left knee in good position.     Treatment options reviewed with patient and her mom along with above left knee xrays. Following plan made:     - Bilateral knee XRs on her way out. Will put results on "[employer]"ner.   - MRI of left knee with MARS protocol to evaluate worsening left knee pain and giving way after acute injury. She has not see any improvement in last 2 months. She has positive mcmurrays on exam.   - She has hinged knee brace at home that she can use prn.   - Previous PT made her worse- did 2 months of PT last fall.   - No improvement with multiple NSAIDs, including voltaren gel.   - No relief with euflexxa injections back in 2019.   - New prescription for robaxin to use prn. Reviewed dosing and side effects. She knows this can make her sleepy.   - Note given as she had to drop out of school due to her knee and inability to get around.   - Message to PCP- she needs to get in to get refills on her medications.   - She's had a lot going on recently  and her depression/anxiety is worse. No current suicidal ideations. Will go to ED if this changes.   - Will review MRI with Dr. Vazquez and message her with results.     Follow up in about 2 months (around 7/1/2023).         ADDENDUM 5/1/23:   X-rays of bilateral knees dated 5/1/23 and done on her way out are personally reviewed and show hardware left knee is intact. No fracture or dislocation noted. Patient sent message.     ADDENDUM 6/15/23:   MRI of left knee dated 6/14/23 is personally reviewed and shows:   1. Possible horizontal tear of lateral meniscus. Partial extrusion of lateral meniscus with questionable horizontal signal.  Of note, assessment for nondisplaced tear is limited due to metallic artifact reduction protocol.  2. Cartilage loss over the far posterior lateral femoral condyle.  3. Edema/synovitis of Hoffa's fat pad.    Will review with Dr. Vazquez and let her know. Message sent to patient regarding results.

## 2023-05-01 ENCOUNTER — OFFICE VISIT (OUTPATIENT)
Dept: ORTHOPEDICS | Facility: CLINIC | Age: 23
End: 2023-05-01
Payer: MEDICAID

## 2023-05-01 ENCOUNTER — TELEPHONE (OUTPATIENT)
Dept: ORTHOPEDICS | Facility: CLINIC | Age: 23
End: 2023-05-01

## 2023-05-01 VITALS — WEIGHT: 210.13 LBS | BODY MASS INDEX: 34.96 KG/M2

## 2023-05-01 DIAGNOSIS — M25.362 INSTABILITY OF LEFT KNEE JOINT: ICD-10-CM

## 2023-05-01 DIAGNOSIS — M25.562 ACUTE PAIN OF LEFT KNEE: Primary | ICD-10-CM

## 2023-05-01 DIAGNOSIS — Z98.890 HISTORY OF LEFT KNEE SURGERY: ICD-10-CM

## 2023-05-01 PROCEDURE — 1160F PR REVIEW ALL MEDS BY PRESCRIBER/CLIN PHARMACIST DOCUMENTED: ICD-10-PCS | Mod: CPTII,,, | Performed by: PHYSICIAN ASSISTANT

## 2023-05-01 PROCEDURE — 1159F PR MEDICATION LIST DOCUMENTED IN MEDICAL RECORD: ICD-10-PCS | Mod: CPTII,,, | Performed by: PHYSICIAN ASSISTANT

## 2023-05-01 PROCEDURE — 99999 PR PBB SHADOW E&M-EST. PATIENT-LVL III: ICD-10-PCS | Mod: PBBFAC,,, | Performed by: PHYSICIAN ASSISTANT

## 2023-05-01 PROCEDURE — 99999 PR PBB SHADOW E&M-EST. PATIENT-LVL III: CPT | Mod: PBBFAC,,, | Performed by: PHYSICIAN ASSISTANT

## 2023-05-01 PROCEDURE — 99213 PR OFFICE/OUTPT VISIT, EST, LEVL III, 20-29 MIN: ICD-10-PCS | Mod: S$PBB,,, | Performed by: PHYSICIAN ASSISTANT

## 2023-05-01 PROCEDURE — 1160F RVW MEDS BY RX/DR IN RCRD: CPT | Mod: CPTII,,, | Performed by: PHYSICIAN ASSISTANT

## 2023-05-01 PROCEDURE — 3008F PR BODY MASS INDEX (BMI) DOCUMENTED: ICD-10-PCS | Mod: CPTII,,, | Performed by: PHYSICIAN ASSISTANT

## 2023-05-01 PROCEDURE — 99213 OFFICE O/P EST LOW 20 MIN: CPT | Mod: S$PBB,,, | Performed by: PHYSICIAN ASSISTANT

## 2023-05-01 PROCEDURE — 99213 OFFICE O/P EST LOW 20 MIN: CPT | Mod: PBBFAC,PN | Performed by: PHYSICIAN ASSISTANT

## 2023-05-01 PROCEDURE — 1159F MED LIST DOCD IN RCRD: CPT | Mod: CPTII,,, | Performed by: PHYSICIAN ASSISTANT

## 2023-05-01 PROCEDURE — 3008F BODY MASS INDEX DOCD: CPT | Mod: CPTII,,, | Performed by: PHYSICIAN ASSISTANT

## 2023-05-01 RX ORDER — METHOCARBAMOL 500 MG/1
500 TABLET, FILM COATED ORAL 3 TIMES DAILY PRN
Qty: 30 TABLET | Refills: 0 | Status: SHIPPED | OUTPATIENT
Start: 2023-05-01 | End: 2023-05-11

## 2023-05-01 NOTE — PATIENT INSTRUCTIONS
It was nice to see you again today! I am sorry that you are hurting so much.     I want to get an MRI of your knee to look into things further. I also want you to get weight bearing xrays of your knees today.     I will message you with results.     You can take methocarbamol as needed to help with spasms. Be careful, it can make you sleepy.     I sent a message to your PCP. They should be calling you.     If you feel like you want to hurt yourself or others, please go directly to the emergency room. They will help you.     You have a follow up with me in July for now.     Please stay in touch and call me if you need anything. You can also send me a message in [facility].     Selina   [phone]

## 2023-05-01 NOTE — TELEPHONE ENCOUNTER
spoke topatient she was informed of her appt for her MRI at [facility]. Patient replied.. Yes Thank You

## 2023-05-01 NOTE — TELEPHONE ENCOUNTER
----- Message from Selina Mckeon PA-C sent at 5/1/2023  8:11 AM CDT -----  Please schedule her for left knee MRI in [place] and call and let her know when it is. This needs to be done with MARs protocol.     Thanks.

## 2023-05-03 LAB — PAP RECOMMENDATION EXT: NORMAL

## 2023-05-06 ENCOUNTER — PATIENT MESSAGE (OUTPATIENT)
Dept: ORTHOPEDICS | Facility: CLINIC | Age: 23
End: 2023-05-06
Payer: MEDICAID

## 2023-05-08 ENCOUNTER — PATIENT MESSAGE (OUTPATIENT)
Dept: ORTHOPEDICS | Facility: CLINIC | Age: 23
End: 2023-05-08
Payer: MEDICAID

## 2023-05-12 ENCOUNTER — TELEPHONE (OUTPATIENT)
Dept: ORTHOPEDICS | Facility: CLINIC | Age: 23
End: 2023-05-12
Payer: MEDICAID

## 2023-05-12 DIAGNOSIS — Z98.890 HISTORY OF LEFT KNEE SURGERY: ICD-10-CM

## 2023-05-12 DIAGNOSIS — M25.362 INSTABILITY OF LEFT KNEE JOINT: ICD-10-CM

## 2023-05-12 DIAGNOSIS — M25.562 ACUTE PAIN OF LEFT KNEE: Primary | ICD-10-CM

## 2023-05-12 NOTE — TELEPHONE ENCOUNTER
----- Message from Catrina Lozano MA sent at 5/12/2023 12:58 PM CDT -----  Please put order in for MRI.. for some reason I can't schedule from the old ones.   ----- Message -----  From: Selina Mckeon PA-C  Sent: 5/11/2023   7:04 AM CDT  To: Linda Marks Staff    She had MRI left knee with MARs protocol scheduled at [place] today. They cannot do the MARs protocol on their machine.     Please reschedule her MRI at [facility] and let her know.

## 2023-05-12 NOTE — TELEPHONE ENCOUNTER
----- Message from Selina Mckeon PA-C sent at 5/11/2023  7:03 AM CDT -----  She had MRI left knee with MARs protocol scheduled at [place] today. They cannot do the MARs protocol on their machine.     Please reschedule her MRI at [facility] and let her know.

## 2023-05-12 NOTE — TELEPHONE ENCOUNTER
New MRI ordered. Needs to be done with MARS protocol. They should be able to do this at main [facility]. I also called radiology here and was told they could do it.     Please reschedule.

## 2023-06-14 ENCOUNTER — HOSPITAL ENCOUNTER (OUTPATIENT)
Dept: RADIOLOGY | Facility: HOSPITAL | Age: 23
Discharge: HOME | End: 2023-06-14
Attending: PHYSICIAN ASSISTANT
Payer: MEDICAID

## 2023-06-14 DIAGNOSIS — Z98.890 HISTORY OF LEFT KNEE SURGERY: ICD-10-CM

## 2023-06-14 DIAGNOSIS — M25.362 INSTABILITY OF LEFT KNEE JOINT: ICD-10-CM

## 2023-06-14 DIAGNOSIS — M25.562 ACUTE PAIN OF LEFT KNEE: ICD-10-CM

## 2023-06-14 PROCEDURE — 73721 MRI JNT OF LWR EXTRE W/O DYE: CPT | Mod: 26,LT,, | Performed by: RADIOLOGY

## 2023-06-14 PROCEDURE — 73721 MRI KNEE WITHOUT CONTRAST LEFT: ICD-10-PCS | Mod: 26,LT,, | Performed by: RADIOLOGY

## 2023-06-14 PROCEDURE — 73721 MRI JNT OF LWR EXTRE W/O DYE: CPT | Mod: TC,LT

## 2023-06-15 ENCOUNTER — PATIENT MESSAGE (OUTPATIENT)
Dept: ORTHOPEDICS | Facility: CLINIC | Age: 23
End: 2023-06-15
Payer: MEDICAID

## 2023-06-16 ENCOUNTER — PATIENT MESSAGE (OUTPATIENT)
Dept: PODIATRY | Facility: CLINIC | Age: 23
End: 2023-06-16
Payer: MEDICAID

## 2023-07-07 ENCOUNTER — OFFICE VISIT (OUTPATIENT)
Dept: ORTHOPEDICS | Facility: CLINIC | Age: 23
End: 2023-07-07
Payer: MEDICAID

## 2023-07-07 ENCOUNTER — TELEPHONE (OUTPATIENT)
Dept: SPORTS MEDICINE | Facility: CLINIC | Age: 23
End: 2023-07-07
Payer: MEDICAID

## 2023-07-07 VITALS — BODY MASS INDEX: 35.21 KG/M2 | HEIGHT: 65 IN | WEIGHT: 211.31 LBS

## 2023-07-07 DIAGNOSIS — Z98.890 S/P MEDIAL PATELLOFEMORAL LIGAMENT RECONSTRUCTION: ICD-10-CM

## 2023-07-07 DIAGNOSIS — Z87.39 S/P MEDIAL PATELLOFEMORAL LIGAMENT RECONSTRUCTION: ICD-10-CM

## 2023-07-07 DIAGNOSIS — G89.29 CHRONIC PAIN OF LEFT KNEE: ICD-10-CM

## 2023-07-07 DIAGNOSIS — M25.562 LEFT KNEE PAIN, UNSPECIFIED CHRONICITY: Primary | ICD-10-CM

## 2023-07-07 DIAGNOSIS — M25.562 CHRONIC PAIN OF LEFT KNEE: ICD-10-CM

## 2023-07-07 DIAGNOSIS — Z98.890 STATUS POST OSTEOTOMY: Primary | ICD-10-CM

## 2023-07-07 PROCEDURE — 1159F PR MEDICATION LIST DOCUMENTED IN MEDICAL RECORD: ICD-10-PCS | Mod: CPTII,,, | Performed by: ORTHOPAEDIC SURGERY

## 2023-07-07 PROCEDURE — 99999 PR PBB SHADOW E&M-EST. PATIENT-LVL III: ICD-10-PCS | Mod: PBBFAC,,, | Performed by: ORTHOPAEDIC SURGERY

## 2023-07-07 PROCEDURE — 1159F MED LIST DOCD IN RCRD: CPT | Mod: CPTII,,, | Performed by: ORTHOPAEDIC SURGERY

## 2023-07-07 PROCEDURE — 99213 OFFICE O/P EST LOW 20 MIN: CPT | Mod: PBBFAC,PN | Performed by: ORTHOPAEDIC SURGERY

## 2023-07-07 PROCEDURE — 99214 PR OFFICE/OUTPT VISIT, EST, LEVL IV, 30-39 MIN: ICD-10-PCS | Mod: S$PBB,,, | Performed by: ORTHOPAEDIC SURGERY

## 2023-07-07 PROCEDURE — 1160F RVW MEDS BY RX/DR IN RCRD: CPT | Mod: CPTII,,, | Performed by: ORTHOPAEDIC SURGERY

## 2023-07-07 PROCEDURE — 3008F PR BODY MASS INDEX (BMI) DOCUMENTED: ICD-10-PCS | Mod: CPTII,,, | Performed by: ORTHOPAEDIC SURGERY

## 2023-07-07 PROCEDURE — 3008F BODY MASS INDEX DOCD: CPT | Mod: CPTII,,, | Performed by: ORTHOPAEDIC SURGERY

## 2023-07-07 PROCEDURE — 1160F PR REVIEW ALL MEDS BY PRESCRIBER/CLIN PHARMACIST DOCUMENTED: ICD-10-PCS | Mod: CPTII,,, | Performed by: ORTHOPAEDIC SURGERY

## 2023-07-07 PROCEDURE — 99999 PR PBB SHADOW E&M-EST. PATIENT-LVL III: CPT | Mod: PBBFAC,,, | Performed by: ORTHOPAEDIC SURGERY

## 2023-07-07 PROCEDURE — 99214 OFFICE O/P EST MOD 30 MIN: CPT | Mod: S$PBB,,, | Performed by: ORTHOPAEDIC SURGERY

## 2023-07-07 RX ORDER — ARIPIPRAZOLE 5 MG/1
5 TABLET ORAL
COMMUNITY
Start: 2023-06-26

## 2023-07-07 RX ORDER — SERTRALINE HYDROCHLORIDE 25 MG/1
25 TABLET, FILM COATED ORAL
COMMUNITY
Start: 2023-06-26

## 2023-07-07 NOTE — PROGRESS NOTES
Subjective:     Chief Complaint: Sheila Urbina is a 22 y.o. female who had concerns including Pain of the Left Knee (Patient presents today for a follow up for her left knee pain she has been having since 2011. Pt stated she ah s 5 knee surgery. ).    HPI    Patient with extensive left knee operative management presents to clinic with chronic left knee pain >10 years. Patient states the pain began she dislocated her patella and 2011.  She has had multiple surgeries to correct patella instability as well as chronic knee pain, mostly under Nader Urbina MD.  None of which have given her any long-term relief.  She denies any recent ARCHIE or traumatic event.  Pain is generalized around the knee, but worse along the lateral aspect at the joint line.  Is made worse with weight-bearing as well as any flexion or extension of the knee to include going up stairs.  She recently obtained an MRI, see detailed findings below.  She has also tried at least 3 series of viscosupplementation injections as well as physical therapy, with the last session being 2 years ago.  She has attempted multiple other conservative measures that include activity modification, ice & elevation, and oral medications (ibuprofen), with no relief. She denies any mechanical symptoms to include locking and catching, but does admit to some subjective instability.  Is affecting ADLs and limiting desired level of activity. Denies numbness, tingling, radiation, and inability to bear weight. She is here today to discuss treatment options.    Surgical history:  2012: Lateral release  2013: MPFL recon ([facility])  2015: TTO & MPFL revision ([facility])  2016: Knee scope w/ hardware removal ([facility])  2017: DFO ([facility])      Review of Systems   Constitutional: Negative.   HENT: Negative.     Eyes: Negative.    Cardiovascular: Negative.    Respiratory: Negative.     Endocrine: Negative.    Hematologic/Lymphatic: Negative.    Skin: Negative.    Musculoskeletal:   Positive for joint pain. Negative for falls, joint swelling, muscle weakness and stiffness.   Neurological: Negative.    Psychiatric/Behavioral: Negative.     Allergic/Immunologic: Negative.                Objective:     General: Sheila is well-developed, well-nourished, appears stated age, in no acute distress, alert and oriented to time, place and person.     General    Nursing note and vitals reviewed.  Constitutional: She is oriented to person, place, and time. She appears well-developed and well-nourished. No distress.   HENT:   Head: Normocephalic and atraumatic.   Nose: Nose normal.   Eyes: EOM are normal.   Cardiovascular:  Intact distal pulses.            Pulmonary/Chest: Effort normal. No respiratory distress.   Neurological: She is alert and oriented to person, place, and time.   Psychiatric: She has a normal mood and affect. Her behavior is normal. Judgment and thought content normal.           Right Knee Exam     Inspection   Erythema: absent  Scars: absent  Swelling: absent  Effusion: absent  Deformity: absent  Bruising: absent    Tenderness   The patient is experiencing no tenderness.     Range of Motion   Extension:  -5   Flexion:  140     Tests   Meniscus   Joshua:  Medial - negative Lateral - negative  Ligament Examination   Lachman: normal (-1 to 2mm)   PCL-Posterior Drawer: normal (0 to 2mm)     MCL - Valgus: normal (0 to 2mm)  LCL - Varus: normal  Patella   Patellar apprehension: negative  Passive Patellar Tilt: neutral  Patellar Tracking: normal  Patellar Grind: negative    Other   Muscle Tightness: hamstring tightness  Sensation: normal    Left Knee Exam     Inspection   Erythema: absent  Scars: present  Swelling: absent  Effusion: absent  Deformity: absent  Bruising: absent    Tenderness   The patient tender to palpation of the patella, patellar tendon and lateral joint line.    Range of Motion   Extension:  0   Flexion:  130     Tests   Meniscus   Joshua:  Medial - positive Lateral -  positive  Stability   Lachman: normal (-1 to 2mm)   PCL-Posterior Drawer: normal (0 to 2mm)  MCL - Valgus: normal (0 to 2mm)  LCL - Varus: normal (0 to 2mm)  Patella   Patellar apprehension: negative  Passive Patellar Tilt: neutral  Patellar Tracking: normal  Patellar Grind: positive    Other   Muscle Tightness: hamstring tightness  Sensation: normal    Muscle Strength   Right Lower Extremity   Quadriceps:  5/5   Hamstrin/5   Left Lower Extremity   Quadriceps:  4/5   Hamstrin/5     Vascular Exam     Right Pulses  Dorsalis Pedis:      2+  Posterior Tibial:      2+        Left Pulses  Dorsalis Pedis:      2+  Posterior Tibial:      2+      Radiograph left knee    My interpretation:     Fixation of postop DFO and TTO hardware in place    MRI left knee    My interpretation:     Meniscus tear within the posterior horn of the lateral meniscus; focal cartilage loss within the lateral femoral condyle      Assessment:     Encounter Diagnoses   Name Primary?    Status post osteotomy Yes    S/P medial patellofemoral ligament reconstruction     Chronic pain of left knee         Plan:     1. I made the decision to order new imaging of the extremity or extremities evaluated. I independently reviewed and interpreted the radiographs and/or MRIs today. These images were shown to the patient where I then discussed my findings in detail.    2. We discussed at length different treatment options including conservative vs surgical management. These include anti-inflammatories, acetaminophen, rest, ice, heat, formal physical therapy including strengthening and stretching exercises, home exercise programs, dry needling, and finally surgical intervention.  We discussed the multiple causes of her knee pain to include lateral meniscus tear as well as focal chondromalacia.  Given the fact her symptoms have not resolved with conservative management, we discussed operative management.  Explained this could consist of left knee  arthroscopy with possible meniscus repair as well as possible cartilage restoration procedure.  We briefly discussed re-attempting physical therapy since this been over 2 years since she has last tried this.  At this time, patient would prefer to speak with the orthopedic surgeon first to see if operative management can be a solution.    3. Referral placed to see my supervising physician Nikhil Hunter MD in 1 week to discuss possible operative management to reduce chronic left knee pain.    4. RTC to see Demian Hwang PA-C in 4 weeks for possible preop appointment.      All of the patient's questions were answered. Patient was advised to call the clinic or contact me through the patient portal for any questions or concerns.       Medical Dictation software was used during the dictation of portions or the entirety of this medical record.  Phonetic or grammatic errors may exist due to the use of this software. For clarification, refer to the author of the document.        Patient questionnaires may have been collected.

## 2023-07-07 NOTE — PROGRESS NOTES
Subjective:      Patient ID: Sheila Urbina is a 22 y.o. female.    Chief Complaint: Pain of the Left Knee (Patient presents today for a follow up for her left knee pain she has been having since 2011. Pt stated she ah s 5 knee surgery. )      BHAKTI  (George)    She has complex surgical history as listed below.   8/2013 MPFL reconstruction  2/2015 Jasper osteotomy  8/2017 distal femur varus-producing osteotomy.  Knee scope. (Patellar chondromalacia noted on scope)    Last seen by me on 9/20/22 for acute trauma to knee at the end of July 2022 when she fell down a hill with left leg bent behind her. MRI of left knee on 9/27/22 showed postoperative changes involving the distal femur and proximal tibia as well as the patella with tace amount of fluid in the popliteal fossa.    MRI reviewed with Dr. Vazquez at that time and he recommended continued PT and possible euflexxa injections.     Seen in ED on 3/6/23 with acute pain in left knee with acute pain after feeling a snap/pop in her knee . Since that time, she had constant pain in left knee that is worse after getting up from sitting. She has locking, catching, and giving way. She rates her pain as a 9 on a scale of 1-10. Pain is constant throbbing.     She has been on multiple NSAIDs in the past without relief, including voltaren gel. Last time she did PT was last fall (did it for 2 months) and it did not help. No relief with euflexxa injections back in 2019.       Past Medical History:   Diagnosis Date    Aggressive behavior     post anesthesia    Essential hypertension 4/12/2021    JAN (generalized anxiety disorder) 9/18/2020    Granuloma of skin     left lower eye lid at age 4    Hypertension     Mild intermittent asthma without complication 9/18/2020    Obesity 5/7/2018    Ovarian cyst 2013    Recurrent dislocation of left patella     Vision abnormalities          Current Outpatient Medications:     albuterol (PROVENTIL/VENTOLIN HFA) 90  "mcg/actuation inhaler, INHALE  2 PUFFS INTO THE LUNGS EVERY 4 TO 6 HOURS AS NEEDED FOR WHEEZING, Disp: 18 g, Rfl: 5    ARIPiprazole (ABILIFY) 5 MG Tab, Take 5 mg by mouth., Disp: , Rfl:     norethindrone-e.estradiol-iron (LOESTRIN 24 FE ORAL), Take by mouth., Disp: , Rfl:     sertraline (ZOLOFT) 25 MG tablet, Take 25 mg by mouth., Disp: , Rfl:     Review of patient's allergies indicates:   Allergen Reactions    Oxycontin [oxycodone]      Nightmares       Review of Systems   Constitutional: Negative for chills, fever, night sweats and weight gain.   Gastrointestinal:  Negative for bowel incontinence, nausea and vomiting.   Genitourinary:  Negative for bladder incontinence.   Neurological:  Negative for disturbances in coordination and loss of balance.         Objective:        Ht 5' 5" (1.651 m)   Wt 95.8 kg (211 lb 4.8 oz)   BMI 35.16 kg/m²     Ortho/SPM Exam    Body habitus is normal.   The patient walks with a limp.    LEFT KNEE EXAM:  Resisted SLR negative.   The skin over the knee is  has multiple healed scars .  Knee effusion not significant   Tendernes is located lateral   Range of motion- Flexion 90 deg with pain, Extension 5 deg,     Ligament exam:   MCL intact   Lachman intact              Post sag intact    LCL intact    No gross instability of knee, but this causes pain.     Patellar apprehension negative.  Popliteal cyst negative  Patellar crepitation absent.  Flexion/pinch (McMurrays) positive for pain    Motor normal 5/5 strength in all tested muscle groups.   Sensory normal.      XRAY INTERPRETATION:  X-rays of left knee (non WB) dated 3/6/23 are personally reviewed and show hardware intact in left knee with reasonable maintenance of joint space.         Assessment:       No diagnosis found.             Plan:       There are no diagnoses linked to this encounter.        Complex history with multiple left knee surgeries as above.     She had acute injury to the knee on 3/6/23 when she felt a snap/pop " in her left knee . Since that time, she has constant pain in left knee that is worse after getting up from sitting. She has locking, catching, and giving way.    XRs of right knee from ED (non WB) showed hardware in left knee in good position.     Treatment options reviewed with patient and her mom along with above left knee xrays. Following plan made:     - Bilateral knee XRs on her way out. Will put results on [facility].   - MRI of left knee with MARS protocol to evaluate worsening left knee pain and giving way after acute injury. She has not see any improvement in last 2 months. She has positive mcmurrays on exam.   - She has hinged knee brace at home that she can use prn.   - Previous PT made her worse- did 2 months of PT last fall.   - No improvement with multiple NSAIDs, including voltaren gel.   - No relief with euflexxa injections back in 2019.   - New prescription for robaxin to use prn. Reviewed dosing and side effects. She knows this can make her sleepy.   - Note given as she had to drop out of school due to her knee and inability to get around.   - Message to PCP- she needs to get in to get refills on her medications.   - She's had a lot going on recently and her depression/anxiety is worse. No current suicidal ideations. Will go to ED if this changes.   - Will review MRI with Dr. Vazquez and message her with results.     No follow-ups on file.         ADDENDUM 5/1/23:   X-rays of bilateral knees dated 5/1/23 and done on her way out are personally reviewed and show hardware left knee is intact. No fracture or dislocation noted. Patient sent message.     ADDENDUM 6/15/23:   MRI of left knee dated 6/14/23 is personally reviewed and shows:   1. Possible horizontal tear of lateral meniscus. Partial extrusion of lateral meniscus with questionable horizontal signal.  Of note, assessment for nondisplaced tear is limited due to metallic artifact reduction protocol.  2. Cartilage loss over the far posterior  lateral femoral condyle.  3. Edema/synovitis of Hoffa's fat pad.    Will review with Dr. Vazquez and let her know. Message sent to patient regarding results.

## 2023-07-10 ENCOUNTER — OFFICE VISIT (OUTPATIENT)
Dept: SPORTS MEDICINE | Facility: CLINIC | Age: 23
End: 2023-07-10
Payer: MEDICAID

## 2023-07-10 VITALS
DIASTOLIC BLOOD PRESSURE: 88 MMHG | WEIGHT: 211.56 LBS | SYSTOLIC BLOOD PRESSURE: 120 MMHG | HEART RATE: 90 BPM | HEIGHT: 65 IN | BODY MASS INDEX: 35.25 KG/M2

## 2023-07-10 DIAGNOSIS — M25.362 PATELLAR INSTABILITY OF LEFT KNEE: Primary | ICD-10-CM

## 2023-07-10 DIAGNOSIS — M23.92 INTERNAL DERANGEMENT OF LEFT KNEE: ICD-10-CM

## 2023-07-10 DIAGNOSIS — M22.42 CHONDROMALACIA PATELLAE OF LEFT KNEE: ICD-10-CM

## 2023-07-10 PROCEDURE — 1160F RVW MEDS BY RX/DR IN RCRD: CPT | Mod: CPTII,,, | Performed by: STUDENT IN AN ORGANIZED HEALTH CARE EDUCATION/TRAINING PROGRAM

## 2023-07-10 PROCEDURE — 1160F PR REVIEW ALL MEDS BY PRESCRIBER/CLIN PHARMACIST DOCUMENTED: ICD-10-PCS | Mod: CPTII,,, | Performed by: STUDENT IN AN ORGANIZED HEALTH CARE EDUCATION/TRAINING PROGRAM

## 2023-07-10 PROCEDURE — 1159F PR MEDICATION LIST DOCUMENTED IN MEDICAL RECORD: ICD-10-PCS | Mod: CPTII,,, | Performed by: STUDENT IN AN ORGANIZED HEALTH CARE EDUCATION/TRAINING PROGRAM

## 2023-07-10 PROCEDURE — 3074F SYST BP LT 130 MM HG: CPT | Mod: CPTII,,, | Performed by: STUDENT IN AN ORGANIZED HEALTH CARE EDUCATION/TRAINING PROGRAM

## 2023-07-10 PROCEDURE — 3079F PR MOST RECENT DIASTOLIC BLOOD PRESSURE 80-89 MM HG: ICD-10-PCS | Mod: CPTII,,, | Performed by: STUDENT IN AN ORGANIZED HEALTH CARE EDUCATION/TRAINING PROGRAM

## 2023-07-10 PROCEDURE — 99214 OFFICE O/P EST MOD 30 MIN: CPT | Mod: S$PBB,,, | Performed by: STUDENT IN AN ORGANIZED HEALTH CARE EDUCATION/TRAINING PROGRAM

## 2023-07-10 PROCEDURE — 99213 OFFICE O/P EST LOW 20 MIN: CPT | Mod: PBBFAC,PN | Performed by: STUDENT IN AN ORGANIZED HEALTH CARE EDUCATION/TRAINING PROGRAM

## 2023-07-10 PROCEDURE — 3074F PR MOST RECENT SYSTOLIC BLOOD PRESSURE < 130 MM HG: ICD-10-PCS | Mod: CPTII,,, | Performed by: STUDENT IN AN ORGANIZED HEALTH CARE EDUCATION/TRAINING PROGRAM

## 2023-07-10 PROCEDURE — 99999 PR PBB SHADOW E&M-EST. PATIENT-LVL III: ICD-10-PCS | Mod: PBBFAC,,, | Performed by: STUDENT IN AN ORGANIZED HEALTH CARE EDUCATION/TRAINING PROGRAM

## 2023-07-10 PROCEDURE — 99999 PR PBB SHADOW E&M-EST. PATIENT-LVL III: CPT | Mod: PBBFAC,,, | Performed by: STUDENT IN AN ORGANIZED HEALTH CARE EDUCATION/TRAINING PROGRAM

## 2023-07-10 PROCEDURE — 99214 PR OFFICE/OUTPT VISIT, EST, LEVL IV, 30-39 MIN: ICD-10-PCS | Mod: S$PBB,,, | Performed by: STUDENT IN AN ORGANIZED HEALTH CARE EDUCATION/TRAINING PROGRAM

## 2023-07-10 PROCEDURE — 3008F BODY MASS INDEX DOCD: CPT | Mod: CPTII,,, | Performed by: STUDENT IN AN ORGANIZED HEALTH CARE EDUCATION/TRAINING PROGRAM

## 2023-07-10 PROCEDURE — 1159F MED LIST DOCD IN RCRD: CPT | Mod: CPTII,,, | Performed by: STUDENT IN AN ORGANIZED HEALTH CARE EDUCATION/TRAINING PROGRAM

## 2023-07-10 PROCEDURE — 3079F DIAST BP 80-89 MM HG: CPT | Mod: CPTII,,, | Performed by: STUDENT IN AN ORGANIZED HEALTH CARE EDUCATION/TRAINING PROGRAM

## 2023-07-10 PROCEDURE — 3008F PR BODY MASS INDEX (BMI) DOCUMENTED: ICD-10-PCS | Mod: CPTII,,, | Performed by: STUDENT IN AN ORGANIZED HEALTH CARE EDUCATION/TRAINING PROGRAM

## 2023-07-10 NOTE — PROGRESS NOTES
Subjective:          Chief Complaint: Sheila Urbina is a 22 y.o. female who had concerns including Pain of the Left Knee.    Sheila Urbina is a 22 y.o. female presents for evaluation of her left knee.  She is a long surgical history:  - 2012 with arthroscopic lateral release by Dr. Sloan   - 08/01/2013 with MPFL reconstruction with hamstring allograft by Dr. Urbina   - 03/30/2020 15 tibial tubercle osteotomy and revision MPFL reconstruction with allograft by Dr. Urbina   - 12/29/2016 with chondroplasty for grade 2 chondromalacia to the patella as well as hardware and we will from the femoral screw from the MPFL reconstruction   - 08/03/2017 with distal femoral osteotomy to correct valgus malalignment    She would return of her pain about 6 months after the distal femoral osteotomy.  The pain has been located on the anterior aspect of the knee, peripatellar, since January of 2018.  She also has some lateral joint line pain as well.  The pain increases with extended periods of weight-bearing including walking.  She also has pain with knee flexion.  Denies any episodes of knee or patella instability.  Denies any catching or mechanical symptoms.  Past Medical History:   Diagnosis Date    Aggressive behavior     post anesthesia    Essential hypertension 4/12/2021    JAN (generalized anxiety disorder) 9/18/2020    Granuloma of skin     left lower eye lid at age 4    Hypertension     Mild intermittent asthma without complication 9/18/2020    Obesity 5/7/2018    Ovarian cyst 2013    Recurrent dislocation of left patella     Vision abnormalities        Current Outpatient Medications on File Prior to Visit   Medication Sig Dispense Refill    albuterol (PROVENTIL/VENTOLIN HFA) 90 mcg/actuation inhaler INHALE  2 PUFFS INTO THE LUNGS EVERY 4 TO 6 HOURS AS NEEDED FOR WHEEZING 18 g 5    ARIPiprazole (ABILIFY) 5 MG Tab Take 5 mg by mouth.      norethindrone-e.estradiol-iron (LOESTRIN 24 FE ORAL) Take  by mouth.      sertraline (ZOLOFT) 25 MG tablet Take 25 mg by mouth.       No current facility-administered medications on file prior to visit.       Past Surgical History:   Procedure Laterality Date    EXAMINATION UNDER ANESTHESIA N/A 10/19/2022    Procedure: RECTAL EXAM UNDER ANESTHESIA;  Surgeon: Nehemias Torres MD;  Location[facility];  Service: General;  Laterality: N/A;    EYE SURGERY      granuloma removal under left eye    INCISION AND DRAINAGE OF PERIRECTAL REGION N/A 12/1/2021    Procedure: INCISION AND DRAINAGE, PERIRECTAL REGION;  Surgeon: Nehemias Torres MD;  Location: [facility] OR;  Service: General;  Laterality: N/A;    KNEE ARTHROSCOPY      Osteotomy of tibia      PATELLA REALIGNMENT         Family History   Problem Relation Age of Onset    Diabetes Mother     Seizures Father        Social History     Socioeconomic History    Marital status: Single   Tobacco Use    Smoking status: Never    Smokeless tobacco: Never   Substance and Sexual Activity    Alcohol use: No    Drug use: No    Sexual activity: Never      Review of Systems   Constitutional: Negative.   HENT: Negative.     Eyes: Negative.    Cardiovascular: Negative.    Respiratory: Negative.     Endocrine: Negative.    Hematologic/Lymphatic: Negative.    Skin: Negative.    Musculoskeletal:  Positive for joint pain, joint swelling and stiffness. Negative for arthritis and falls.   Neurological: Negative.    Psychiatric/Behavioral: Negative.     Allergic/Immunologic: Negative.                  Objective:        General: Sheila is well-developed, well-nourished, appears stated age, in no acute distress, alert and oriented to time, place and person.     General    Nursing note and vitals reviewed.  Constitutional: She is oriented to person, place, and time. She appears well-developed and well-nourished. No distress.   HENT:   Head: Normocephalic and atraumatic.   Nose: Nose normal.   Eyes: EOM are normal.   Cardiovascular:  Intact distal  pulses.            Pulmonary/Chest: Effort normal. No respiratory distress.   Neurological: She is alert and oriented to person, place, and time.   Psychiatric: She has a normal mood and affect. Her behavior is normal. Judgment and thought content normal.           Right Knee Exam     Inspection   Erythema: absent  Scars: absent  Swelling: absent  Effusion: absent  Deformity: absent  Bruising: absent    Tenderness   The patient is experiencing no tenderness.     Range of Motion   Extension:  -5   Flexion:  140     Tests   Meniscus   Joshua:  Medial - negative Lateral - negative  Ligament Examination   Lachman: normal (-1 to 2mm)   PCL-Posterior Drawer: normal (0 to 2mm)     MCL - Valgus: normal (0 to 2mm)  LCL - Varus: normal  Patella   Patellar apprehension: negative  Passive Patellar Tilt: neutral  Patellar Tracking: normal  Patellar Glide (quadrants): Lateral - 1   Medial - 1  Patellar Grind: negative    Other   Sensation: normal    Left Knee Exam     Inspection   Erythema: absent  Scars: present (Prior surgical incisions are well healed)  Swelling: present  Effusion: absent  Deformity: absent  Bruising: absent    Tenderness   The patient tender to palpation of the patella and lateral joint line.    Crepitus   The patient has crepitus of the patella and lateral joint line.    Range of Motion   Extension:  0   Flexion:  140     Tests   Meniscus   Joshua:  Medial - negative Lateral - negative  Stability   Lachman: normal (-1 to 2mm)   PCL-Posterior Drawer: normal (0 to 2mm)  MCL - Valgus: normal (0 to 2mm)  LCL - Varus: normal (0 to 2mm)  Patella   Patellar apprehension: trace  Passive Patellar Tilt: neutral  Patellar Tracking: normal  Patellar Glide (Quadrants): Lateral - 3 Medial - 1  Patellar Grind: positive  J-Sign: J sign absent    Other   Sensation: normal    Muscle Strength   Right Lower Extremity   Quadriceps:  5/5   Hamstrin/5   Left Lower Extremity   Quadriceps:  5/5   Hamstrin/5      Vascular Exam     Right Pulses  Dorsalis Pedis:      2+  Posterior Tibial:      2+        Left Pulses  Dorsalis Pedis:      2+  Posterior Tibial:      2+        Imaging:  X-rays of the knees from 05/01/2023 personally by me 07/10/2023.  These include weight-bearing AP, PA flexion, lateral, and Merchant views.  There is rotational malalignment seen on left knee, possibly due to positioning.  Evidence of prior tibial tubercle osteotomy and distal femoral osteotomy with well-positioned hardware with no noted complications and healed osteotomy sites.  There is possibly loss of joint space in the lateral compartment on the left.      MRI of the left knee from 06/14/2023 personally viewed by me 07/10/2023.  Evidence of prior MPFL reconstruction with intact graft.  Distal femoral osteotomy.  The cruciate and collateral ligaments appear intact.  There is possible tearing of the menisci, however difficult to visualize.  Full-thickness chondral defect to posterior aspect of the lateral femoral condyle as well as the patella.    Hip to ankle x-rays from 07/10/2023 personally reviewed by me on that day.  There is neutral alignment of the left lower extremity.      Assessment:     Sheila Urbina is a 22 y.o. female with multiple patellar stabilization procedures now with chondromalacia to the knee  Encounter Diagnoses   Name Primary?    Patellar instability of left knee Yes    Internal derangement of left knee     Chondromalacia patellae of left knee           Plan:         Given the question for possible rotational issues, we will obtain a CT scan to evaluate femoral and tibial rotation as well as healing of the osteotomies.  She will return to clinic afterwards to discuss potential treatment options.    All of their questions were answered.  They will call the clinic with any questions or concerns in the interim.    Should the patient's symptoms worsen, persist, or fail to improve they should return for  reevaluation and I would be happy to see them back anytime.        Nikhil Hunter M.D.    Please be aware that this note has been generated with the assistance of [employer] voice-to-text.  Please excuse any spelling or grammatical errors.    Thank you for choosing Dr. Nikhil Hunter for your sports medicine care. It is our goal to provide you with exceptional care that will help keep you healthy, active, and get you back in the game.     If you felt that you received exemplary care today, please consider leaving feedback for Dr. Hunter on [email or web address] at [email or web address].    Please do not hesitate to reach out to us via email, phone, or MyChart with any questions, concerns, or feedback.

## 2023-07-12 ENCOUNTER — PATIENT MESSAGE (OUTPATIENT)
Dept: ADMINISTRATIVE | Facility: HOSPITAL | Age: 23
End: 2023-07-12
Payer: MEDICAID

## 2023-07-20 ENCOUNTER — PATIENT OUTREACH (OUTPATIENT)
Dept: ADMINISTRATIVE | Facility: HOSPITAL | Age: 23
End: 2023-07-20
Payer: MEDICAID

## 2023-07-20 ENCOUNTER — HOSPITAL ENCOUNTER (OUTPATIENT)
Dept: RADIOLOGY | Facility: HOSPITAL | Age: 23
Discharge: HOME | End: 2023-07-20
Attending: STUDENT IN AN ORGANIZED HEALTH CARE EDUCATION/TRAINING PROGRAM
Payer: MEDICAID

## 2023-07-20 DIAGNOSIS — M25.362 PATELLAR INSTABILITY OF LEFT KNEE: ICD-10-CM

## 2023-07-20 DIAGNOSIS — M23.92 INTERNAL DERANGEMENT OF LEFT KNEE: ICD-10-CM

## 2023-07-20 PROCEDURE — 73700 CT LOWER EXTREMITY W/O DYE: CPT | Mod: 26,LT,, | Performed by: INTERNAL MEDICINE

## 2023-07-20 PROCEDURE — 73700 CT LOWER EXTREMITY W/O DYE: CPT | Mod: TC,LT

## 2023-07-20 PROCEDURE — 73700 CT KNEE WITHOUT CONTRAST LEFT: ICD-10-PCS | Mod: 26,LT,, | Performed by: INTERNAL MEDICINE

## 2023-07-24 ENCOUNTER — OFFICE VISIT (OUTPATIENT)
Dept: SPORTS MEDICINE | Facility: CLINIC | Age: 23
End: 2023-07-24
Payer: MEDICAID

## 2023-07-24 VITALS — BODY MASS INDEX: 35.28 KG/M2 | HEIGHT: 65 IN | WEIGHT: 211.75 LBS

## 2023-07-24 DIAGNOSIS — M25.362 PATELLAR INSTABILITY OF LEFT KNEE: Primary | ICD-10-CM

## 2023-07-24 DIAGNOSIS — M22.42 CHONDROMALACIA PATELLAE OF LEFT KNEE: ICD-10-CM

## 2023-07-24 PROCEDURE — 4010F PR ACE/ARB THEARPY RXD/TAKEN: ICD-10-PCS | Mod: CPTII,,, | Performed by: STUDENT IN AN ORGANIZED HEALTH CARE EDUCATION/TRAINING PROGRAM

## 2023-07-24 PROCEDURE — 99999 PR PBB SHADOW E&M-EST. PATIENT-LVL III: ICD-10-PCS | Mod: PBBFAC,,, | Performed by: STUDENT IN AN ORGANIZED HEALTH CARE EDUCATION/TRAINING PROGRAM

## 2023-07-24 PROCEDURE — 1160F RVW MEDS BY RX/DR IN RCRD: CPT | Mod: CPTII,,, | Performed by: STUDENT IN AN ORGANIZED HEALTH CARE EDUCATION/TRAINING PROGRAM

## 2023-07-24 PROCEDURE — 3008F PR BODY MASS INDEX (BMI) DOCUMENTED: ICD-10-PCS | Mod: CPTII,,, | Performed by: STUDENT IN AN ORGANIZED HEALTH CARE EDUCATION/TRAINING PROGRAM

## 2023-07-24 PROCEDURE — 1159F MED LIST DOCD IN RCRD: CPT | Mod: CPTII,,, | Performed by: STUDENT IN AN ORGANIZED HEALTH CARE EDUCATION/TRAINING PROGRAM

## 2023-07-24 PROCEDURE — 99999 PR PBB SHADOW E&M-EST. PATIENT-LVL III: CPT | Mod: PBBFAC,,, | Performed by: STUDENT IN AN ORGANIZED HEALTH CARE EDUCATION/TRAINING PROGRAM

## 2023-07-24 PROCEDURE — 99215 OFFICE O/P EST HI 40 MIN: CPT | Mod: S$PBB,,, | Performed by: STUDENT IN AN ORGANIZED HEALTH CARE EDUCATION/TRAINING PROGRAM

## 2023-07-24 PROCEDURE — 99213 OFFICE O/P EST LOW 20 MIN: CPT | Mod: PBBFAC,PN | Performed by: STUDENT IN AN ORGANIZED HEALTH CARE EDUCATION/TRAINING PROGRAM

## 2023-07-24 PROCEDURE — 4010F ACE/ARB THERAPY RXD/TAKEN: CPT | Mod: CPTII,,, | Performed by: STUDENT IN AN ORGANIZED HEALTH CARE EDUCATION/TRAINING PROGRAM

## 2023-07-24 PROCEDURE — 1160F PR REVIEW ALL MEDS BY PRESCRIBER/CLIN PHARMACIST DOCUMENTED: ICD-10-PCS | Mod: CPTII,,, | Performed by: STUDENT IN AN ORGANIZED HEALTH CARE EDUCATION/TRAINING PROGRAM

## 2023-07-24 PROCEDURE — 1159F PR MEDICATION LIST DOCUMENTED IN MEDICAL RECORD: ICD-10-PCS | Mod: CPTII,,, | Performed by: STUDENT IN AN ORGANIZED HEALTH CARE EDUCATION/TRAINING PROGRAM

## 2023-07-24 PROCEDURE — 3008F BODY MASS INDEX DOCD: CPT | Mod: CPTII,,, | Performed by: STUDENT IN AN ORGANIZED HEALTH CARE EDUCATION/TRAINING PROGRAM

## 2023-07-24 PROCEDURE — 99215 PR OFFICE/OUTPT VISIT, EST, LEVL V, 40-54 MIN: ICD-10-PCS | Mod: S$PBB,,, | Performed by: STUDENT IN AN ORGANIZED HEALTH CARE EDUCATION/TRAINING PROGRAM

## 2023-07-24 NOTE — PROGRESS NOTES
Subjective:          Chief Complaint: Sheila Urbina is a 22 y.o. female who had concerns including Results of the Left Knee.    HPI 07/24/2023:  Sheila Urbina is a 22 y.o. female returns today for follow-up for left knee.  She would a CT scan since her last visit, see my detailed interpretation below.  Overall her symptoms are unchanged.  Still has debilitating anterior knee pain, peripatellar.  Worse with extended periods of knee flexion.      HPI 7/10/2023  Sheila Urbina is a 22 y.o. female presents for evaluation of her left knee.  She is a long surgical history:  - 2012 with arthroscopic lateral release by Dr. Sloan   - 08/01/2013 with MPFL reconstruction with hamstring allograft by Dr. Urbina   - 03/30/2020 15 tibial tubercle osteotomy and revision MPFL reconstruction with allograft by Dr. Urbina   - 12/29/2016 with chondroplasty for grade 2 chondromalacia to the patella as well as hardware and we will from the femoral screw from the MPFL reconstruction   - 08/03/2017 with distal femoral osteotomy to correct valgus malalignment    She would return of her pain about 6 months after the distal femoral osteotomy.  The pain has been located on the anterior aspect of the knee, peripatellar, since January of 2018.  She also has some lateral joint line pain as well.  The pain increases with extended periods of weight-bearing including walking.  She also has pain with knee flexion.  Denies any episodes of knee or patella instability.  Denies any catching or mechanical symptoms.    Past Medical History:   Diagnosis Date    Aggressive behavior     post anesthesia    Essential hypertension 4/12/2021    JAN (generalized anxiety disorder) 9/18/2020    Granuloma of skin     left lower eye lid at age 4    Hypertension     Mild intermittent asthma without complication 9/18/2020    Obesity 5/7/2018    Ovarian cyst 2013    Recurrent dislocation of left patella     Vision abnormalities        Current  Outpatient Medications on File Prior to Visit   Medication Sig Dispense Refill    albuterol (PROVENTIL/VENTOLIN HFA) 90 mcg/actuation inhaler INHALE  2 PUFFS INTO THE LUNGS EVERY 4 TO 6 HOURS AS NEEDED FOR WHEEZING 18 g 5    ARIPiprazole (ABILIFY) 5 MG Tab Take 5 mg by mouth.      lisinopriL (PRINIVIL,ZESTRIL) 5 MG tablet Take 1 tablet (5 mg total) by mouth once daily. 30 tablet 1    norethindrone-e.estradiol-iron (LOESTRIN 24 FE ORAL) Take by mouth.      sertraline (ZOLOFT) 25 MG tablet Take 25 mg by mouth.       No current facility-administered medications on file prior to visit.       Past Surgical History:   Procedure Laterality Date    EXAMINATION UNDER ANESTHESIA N/A 10/19/2022    Procedure: RECTAL EXAM UNDER ANESTHESIA;  Surgeon: Nehemias Torres MD;  Location: [facility] OR;  Service: General;  Laterality: N/A;    EYE SURGERY      granuloma removal under left eye    INCISION AND DRAINAGE OF PERIRECTAL REGION N/A 12/1/2021    Procedure: INCISION AND DRAINAGE, PERIRECTAL REGION;  Surgeon: Nehemias Torres MD;  Location: [facility] OR;  Service: General;  Laterality: N/A;    KNEE ARTHROSCOPY      Osteotomy of tibia      PATELLA REALIGNMENT         Family History   Problem Relation Age of Onset    Diabetes Mother     Seizures Father        Social History     Socioeconomic History    Marital status: Single   Tobacco Use    Smoking status: Never    Smokeless tobacco: Never   Substance and Sexual Activity    Alcohol use: No    Drug use: No    Sexual activity: Never      Review of Systems   Constitutional: Negative.   HENT: Negative.     Eyes: Negative.    Cardiovascular: Negative.    Respiratory: Negative.     Endocrine: Negative.    Hematologic/Lymphatic: Negative.    Skin: Negative.    Musculoskeletal:  Positive for joint pain, joint swelling and stiffness. Negative for arthritis and falls.   Neurological: Negative.    Psychiatric/Behavioral: Negative.     Allergic/Immunologic: Negative.                  Objective:         General: Sheila is well-developed, well-nourished, appears stated age, in no acute distress, alert and oriented to time, place and person.     General    Nursing note and vitals reviewed.  Constitutional: She is oriented to person, place, and time. She appears well-developed and well-nourished. No distress.   HENT:   Head: Normocephalic and atraumatic.   Nose: Nose normal.   Eyes: EOM are normal.   Cardiovascular:  Intact distal pulses.            Pulmonary/Chest: Effort normal. No respiratory distress.   Neurological: She is alert and oriented to person, place, and time.   Psychiatric: She has a normal mood and affect. Her behavior is normal. Judgment and thought content normal.           Right Knee Exam     Inspection   Erythema: absent  Scars: absent  Swelling: absent  Effusion: absent  Deformity: absent  Bruising: absent    Tenderness   The patient is experiencing no tenderness.     Range of Motion   Extension:  -5   Flexion:  140     Tests   Meniscus   Joshua:  Medial - negative Lateral - negative  Ligament Examination   Lachman: normal (-1 to 2mm)   PCL-Posterior Drawer: normal (0 to 2mm)     MCL - Valgus: normal (0 to 2mm)  LCL - Varus: normal  Patella   Patellar apprehension: negative  Passive Patellar Tilt: neutral  Patellar Tracking: normal  Patellar Glide (quadrants): Lateral - 1   Medial - 1  Patellar Grind: negative    Other   Sensation: normal    Left Knee Exam     Inspection   Erythema: absent  Scars: present (Prior surgical incisions are well healed)  Swelling: present  Effusion: absent  Deformity: absent  Bruising: absent    Tenderness   The patient tender to palpation of the patella and lateral joint line.    Crepitus   The patient has crepitus of the patella and lateral joint line.    Range of Motion   Extension:  0   Flexion:  140     Tests   Meniscus   Joshua:  Medial - negative Lateral - negative  Stability   Lachman: normal (-1 to 2mm)   PCL-Posterior Drawer: normal (0 to 2mm)  MCL -  Valgus: normal (0 to 2mm)  LCL - Varus: normal (0 to 2mm)  Patella   Patellar apprehension: trace  Passive Patellar Tilt: neutral  Patellar Tracking: normal  Patellar Glide (Quadrants): Lateral - 3 Medial - 1  Patellar Grind: positive  J-Sign: J sign absent    Other   Sensation: normal    Muscle Strength   Right Lower Extremity   Quadriceps:  5/5   Hamstrin/5   Left Lower Extremity   Quadriceps:  5/5   Hamstrin/5     Vascular Exam     Right Pulses  Dorsalis Pedis:      2+  Posterior Tibial:      2+        Left Pulses  Dorsalis Pedis:      2+  Posterior Tibial:      2+        Imaging:  X-rays of the knees from 2023 personally by me 07/10/2023.  These include weight-bearing AP, PA flexion, lateral, and Merchant views.  There is rotational malalignment seen on left knee, possibly due to positioning.  Evidence of prior tibial tubercle osteotomy and distal femoral osteotomy with well-positioned hardware with no noted complications and healed osteotomy sites.  There is possibly loss of joint space in the lateral compartment on the left.      MRI of the left knee from 2023 personally viewed by me 07/10/2023.  Evidence of prior MPFL reconstruction with intact graft.  Distal femoral osteotomy.  The cruciate and collateral ligaments appear intact.  There is possible tearing of the menisci, however difficult to visualize.  Full-thickness chondral defect to posterior aspect of the lateral femoral condyle as well as the patella.    Hip to ankle x-rays from 07/10/2023 personally reviewed by me on that day.  There is neutral alignment of the left lower extremity.    CT scan of the left knee from 2023 personally viewed by me 2023.  TT TG is 7 mm.  Healed tibial tubercle and distal femoral osteotomies.      Assessment:     Sheila Urbina is a 22 y.o. female with multiple patellar stabilization procedures now with chondromalacia to the knee  Encounter Diagnoses   Name Primary?    Patellar  instability of left knee Yes    Chondromalacia patellae of left knee           Plan:       The diagnosis treatment options were discussed at length with the patient all her questions were answered.  I showed her the CT scan and reviewed the findings with her.  She is done extensive course of nonoperative management which have failed to relieve her symptoms.  This includes physical therapy and viscosupplementation injections.  We discussed surgical intervention.  This would be knee arthroscopy with chondroplasty of the patella and possible cartilage allografting.  However, if the lesion of the patella is very large we would halt the procedure and plan for osteochondral allografting.  Additionally, depending on the contact forces between the patella and the trochlea we will consider revision tibial tubercle osteotomy to move the tibial tubercle osteotomy anterior.  Additionally, we would evaluate the integrity of the MPFL graft, this feels loose we would proceed with M QT FL reconstruction.  The procedures, as well as the risks, benefits, and rehabilitation protocol were discussed at length with the patient all her questions were answered.  After discussion we elected proceed with surgery.  We will plan this for 08/02/2023.  She does not need preoperative clearance.    The risks, benefits and alternatives to surgery were discussed with the patient at great length.  These include, but not limited to, bleeding, infection, vessel/nerve damage, pain, numbness, tingling, complex regional pain syndrome, hardware/surgical failure, need for further surgery, malunion, nonunion, failure of graft incorporation, stiffness, DVT, PE, arthritis and death.  Patient states an understanding and wishes to proceed with surgery.   All questions were answered.  No guarantees were implied or stated.  Informed consent was obtained.     All of their questions were answered.  They will call the clinic with any questions or concerns in the  interim.    Should the patient's symptoms worsen, persist, or fail to improve they should return for reevaluation and I would be happy to see them back anytime.        Nikhil Hunter M.D.    Please be aware that this note has been generated with the assistance of [employer] voice-to-text.  Please excuse any spelling or grammatical errors.    Thank you for choosing Dr. Nikhil Hunter for your sports medicine care. It is our goal to provide you with exceptional care that will help keep you healthy, active, and get you back in the game.     If you felt that you received exemplary care today, please consider leaving feedback for Dr. Hunter on [email or web address] at [email or web address].    Please do not hesitate to reach out to us via email, phone, or MyChart with any questions, concerns, or feedback.

## 2023-07-26 ENCOUNTER — PATIENT MESSAGE (OUTPATIENT)
Dept: ADMINISTRATIVE | Facility: HOSPITAL | Age: 23
End: 2023-07-26
Payer: MEDICAID

## 2023-07-27 ENCOUNTER — PATIENT MESSAGE (OUTPATIENT)
Dept: PREADMISSION TESTING | Facility: HOSPITAL | Age: 23
End: 2023-07-27
Payer: MEDICAID

## 2023-07-27 NOTE — ANESTHESIA PAT ROS NOTE
07/27/2023  Sheila Urbina is a 22 y.o., female.      Pre-op Assessment    I have reviewed the Patient Summary Reports.       I have reviewed the Medications.     Review of Systems  Anesthesia Hx:    H/O Aggressive behavior post anesthesia, no problems with most recent 4 surgeries, with surgery on 3/30/2015- Agitation postoperatively History of prior surgery of interest to airway management or planning:  Previous anesthesia: General, Nerve Block 10/19/2022  Rectal exam under anesthesia with general anesthesia.  Procedure performed at an [facility].     for 8/3/2017  Left Knee Arthroscopy, Osteotomy.  Procedure performed at an [facility].  Airway issues documented on chart review include mask, easy, laryngeal mask airway used     Denies Family Hx of Anesthesia complications.   Personal Hx of Anesthesia complications               Paradoxical Response To Benzodiazapines      Social:  Non-Smoker, No Alcohol Use       Hematology/Oncology:    Oncology Normal    -- Anemia:               Hematology Comments: Iron deficiency anemia,  Thrombocytopenia, Vitamin D deficiency,  H&H = 12.4/ 36.7 on 7/26/2023,  PLT = 37,000                      EENT/Dental:   H/O granuloma to skin of left lower eye lid at age 4, S/P Removal, vision abnormalities          Cardiovascular:  Exercise tolerance: good   Hypertension     Denies Dysrhythmias.         Denies COATS.   H/O Atypical chest pain, EKG ordered, scheduled to be done on 8/7/2023                         Pulmonary:    Asthma mild  Denies Shortness of breath.   Mild intermittent asthma without complication               Renal/:  Renal/ Normal  Denies Chronic Renal Disease.                Hepatic/GI:      Denies GERD. Denies Liver Disease.  H/O Perineal abscess/ Fistula, S/P Rectal Examination under anesthesia, I&D perirectal region           Musculoskeletal:  Arthritis   Patellar instability of left knee, recurrent dislocations of left patella,  S/P Left Knee Arthroscopy, MPFL reconstruction with hamstring allograft, Tibial Tubercle Osteotomy and revision MPFL, Femoral Osteotomy to correct valgus malalignment,  H/O Patellofemoral arthritis of left knee,  Meniscal injury, left,               OB/GYN/PEDS:  H/O Ovarian cyst           Neurological:  Neurology Normal      Denies Headaches. Denies Seizures.                                Endocrine:  Denies Diabetes. Denies Hypothyroidism.        Obesity / BMI > 30  Psych:  Psychiatric History anxiety  Generalized anxiety Disorder (JAN), Bipolar Affective Disorder              Past Medical History:   Diagnosis Date    Aggressive behavior     post anesthesia    Essential hypertension 4/12/2021    JAN (generalized anxiety disorder) 9/18/2020    Granuloma of skin     left lower eye lid at age 4    Hypertension     Mild intermittent asthma without complication 9/18/2020    Obesity 5/7/2018    Ovarian cyst 2013    Recurrent dislocation of left patella     Vision abnormalities      Past Surgical History:   Procedure Laterality Date    EXAMINATION UNDER ANESTHESIA N/A 10/19/2022    Procedure: RECTAL EXAM UNDER ANESTHESIA;  Surgeon: Nehemias Torres MD;  Location: [facility];  Service: General;  Laterality: N/A;    EYE SURGERY      granuloma removal under left eye    INCISION AND DRAINAGE OF PERIRECTAL REGION N/A 12/1/2021    Procedure: INCISION AND DRAINAGE, PERIRECTAL REGION;  Surgeon: Nehemias Torres MD;  Location: [facility] OR;  Service: General;  Laterality: N/A;    KNEE ARTHROSCOPY      Osteotomy of tibia      PATELLA REALIGNMENT         Anesthesia Assessment: Preoperative EQUATION    Planned Procedure: Procedure(s) (LRB):  ARTHROSCOPY, KNEE, WITH CHONDROPLASTY (Left)  TUBERCLEPLASTY (Left)  Requested Anesthesia Type:Regional  Surgeon: Nikhil Hunter MD  Service: Orthopedics  Known or anticipated Date of  Surgery:8/2/2023    Surgeon notes: reviewed    Electronic QUestionnaire Assessment completed via nurse interview with patient.        Triage considerations:     The patient has no apparent active cardiac condition (No unstable coronary Syndrome such as severe unstable angina or recent [<1 month] myocardial infarction, decompensated CHF, severe valvular   disease or significant arrhythmia)    Previous anesthesia records:LMA General, Nerve block for post-op pain, Easy airway, and No problems    Last PCP note: within 1 month , within [facility]     Other important co-morbidities: HTN and Obesity             Instructions given. (See in Nurse's note)      Ht: 5'5  Wt: 211 lb  BMI: 35.24  Vaccinated

## 2023-07-31 ENCOUNTER — OFFICE VISIT (OUTPATIENT)
Dept: SPORTS MEDICINE | Facility: CLINIC | Age: 23
End: 2023-07-31
Payer: MEDICAID

## 2023-07-31 VITALS — WEIGHT: 211.19 LBS | BODY MASS INDEX: 35.18 KG/M2 | HEIGHT: 65 IN

## 2023-07-31 DIAGNOSIS — M22.42 CHONDROMALACIA PATELLAE OF LEFT KNEE: ICD-10-CM

## 2023-07-31 DIAGNOSIS — M25.362 PATELLAR INSTABILITY OF LEFT KNEE: Primary | ICD-10-CM

## 2023-07-31 PROCEDURE — 99499 UNLISTED E&M SERVICE: CPT | Mod: S$PBB,,, | Performed by: ORTHOPAEDIC SURGERY

## 2023-07-31 PROCEDURE — 99999 PR PBB SHADOW E&M-EST. PATIENT-LVL III: ICD-10-PCS | Mod: PBBFAC,,, | Performed by: ORTHOPAEDIC SURGERY

## 2023-07-31 PROCEDURE — 99213 OFFICE O/P EST LOW 20 MIN: CPT | Mod: PBBFAC,PN | Performed by: ORTHOPAEDIC SURGERY

## 2023-07-31 PROCEDURE — 99499 NO LOS: ICD-10-PCS | Mod: S$PBB,,, | Performed by: ORTHOPAEDIC SURGERY

## 2023-07-31 PROCEDURE — 99999 PR PBB SHADOW E&M-EST. PATIENT-LVL III: CPT | Mod: PBBFAC,,, | Performed by: ORTHOPAEDIC SURGERY

## 2023-07-31 RX ORDER — CELECOXIB 200 MG/1
200 CAPSULE ORAL 2 TIMES DAILY WITH MEALS
Qty: 60 CAPSULE | Refills: 0 | Status: SHIPPED | OUTPATIENT
Start: 2023-07-31 | End: 2023-08-14 | Stop reason: ALTCHOICE

## 2023-07-31 RX ORDER — SODIUM CHLORIDE 9 MG/ML
INJECTION, SOLUTION INTRAVENOUS CONTINUOUS
Status: CANCELLED | OUTPATIENT
Start: 2023-07-31

## 2023-07-31 RX ORDER — OXYCODONE HYDROCHLORIDE 5 MG/1
5 TABLET ORAL EVERY 6 HOURS PRN
Qty: 28 TABLET | Refills: 0 | Status: SHIPPED | OUTPATIENT
Start: 2023-07-31 | End: 2023-12-20

## 2023-07-31 RX ORDER — PROMETHAZINE HYDROCHLORIDE 25 MG/1
25 TABLET ORAL EVERY 6 HOURS PRN
Qty: 30 TABLET | Refills: 0 | Status: SHIPPED | OUTPATIENT
Start: 2023-07-31 | End: 2023-12-20

## 2023-07-31 RX ORDER — ASPIRIN 81 MG/1
81 TABLET ORAL DAILY
Qty: 42 TABLET | Refills: 0 | Status: SHIPPED | OUTPATIENT
Start: 2023-07-31 | End: 2023-09-13

## 2023-07-31 RX ORDER — METHOCARBAMOL 500 MG/1
500 TABLET, FILM COATED ORAL 3 TIMES DAILY PRN
Qty: 30 TABLET | Refills: 0 | Status: SHIPPED | OUTPATIENT
Start: 2023-07-31 | End: 2023-08-17 | Stop reason: SDUPTHER

## 2023-07-31 RX ORDER — CEFAZOLIN SODIUM 2 G/50ML
2 SOLUTION INTRAVENOUS
Status: CANCELLED | OUTPATIENT
Start: 2023-07-31

## 2023-07-31 NOTE — H&P
Sheila Urbina  is here for a completion of her perioperative paperwork. she  Is scheduled to undergo:    Left knee  Arthroscopy   Possible use of allograft  Possible cartilage allograft  Possible revision tibial tubercle osteotomy  Possible medial quadriceps femoral ligament reconstruction   All other indicated procedures     on 08/02/2023.  She is a healthy individual and does not need clearance for this procedure.     Risks, indications and benefits of the surgical procedure were discussed with the patient. All questions with regard to surgery, rehab, expected return to functional activities, activities of daily living and recreational endeavors were answered to her satisfaction.    Discussed COVID-19 with the patient, they are aware of our current policies and procedures, were given the option of delaying surgery, and they elect to proceed.    Patient was informed and understands the risks of surgery are greater for patients with a current condition or history of heart disease, obesity, clotting disorders, recurrent infections, steroid use, current or past smoking, and factors such as sedentary lifestyle and noncompliance with medications, therapy or follow-up. The degree of the increased risk is hard to estimate with any degree of precision.    Once no other questions were asked, a brief history and physical exam was then performed.    PAST MEDICAL HISTORY:   Past Medical History:   Diagnosis Date    Aggressive behavior     post anesthesia    Essential hypertension 4/12/2021    JAN (generalized anxiety disorder) 9/18/2020    Granuloma of skin     left lower eye lid at age 4    Hypertension     Mild intermittent asthma without complication 9/18/2020    Obesity 5/7/2018    Ovarian cyst 2013    Recurrent dislocation of left patella     Vision abnormalities      PAST SURGICAL HISTORY:   Past Surgical History:   Procedure Laterality Date    EXAMINATION UNDER ANESTHESIA N/A 10/19/2022    Procedure: RECTAL EXAM  UNDER ANESTHESIA;  Surgeon: Nehemias Torres MD;  Location: [facility] OR;  Service: General;  Laterality: N/A;    EYE SURGERY      granuloma removal under left eye    INCISION AND DRAINAGE OF PERIRECTAL REGION N/A 12/1/2021    Procedure: INCISION AND DRAINAGE, PERIRECTAL REGION;  Surgeon: Nehemias Torres MD;  Location: [facility] OR;  Service: General;  Laterality: N/A;    KNEE ARTHROSCOPY      Osteotomy of tibia      PATELLA REALIGNMENT       FAMILY HISTORY:   Family History   Problem Relation Age of Onset    Diabetes Mother     Seizures Father      SOCIAL HISTORY:   Social History     Socioeconomic History    Marital status: Single   Tobacco Use    Smoking status: Never    Smokeless tobacco: Never   Substance and Sexual Activity    Alcohol use: No    Drug use: No    Sexual activity: Never       MEDICATIONS:   Current Outpatient Medications:     albuterol (PROVENTIL/VENTOLIN HFA) 90 mcg/actuation inhaler, INHALE  2 PUFFS INTO THE LUNGS EVERY 4 TO 6 HOURS AS NEEDED FOR WHEEZING, Disp: 18 g, Rfl: 5    ARIPiprazole (ABILIFY) 5 MG Tab, Take 5 mg by mouth., Disp: , Rfl:     lisinopriL (PRINIVIL,ZESTRIL) 5 MG tablet, Take 1 tablet (5 mg total) by mouth once daily., Disp: 30 tablet, Rfl: 1    norethindrone-e.estradiol-iron (LOESTRIN 24 FE ORAL), Take by mouth., Disp: , Rfl:     sertraline (ZOLOFT) 25 MG tablet, Take 25 mg by mouth., Disp: , Rfl:   ALLERGIES:   Review of patient's allergies indicates:   Allergen Reactions    Oxycontin [oxycodone]      Nightmares       Review of Systems   Constitution: Negative. Negative for chills, fever and night sweats.   HENT: Negative for congestion and headaches.    Eyes: Negative for blurred vision, left vision loss and right vision loss.   Cardiovascular: Negative for chest pain and syncope.   Respiratory: Negative for cough and shortness of breath.    Endocrine: Negative for polydipsia, polyphagia and polyuria.   Hematologic/Lymphatic: Negative for bleeding problem. Does not bruise/bleed easily.    Skin: Negative for dry skin, itching and rash.   Musculoskeletal: Negative for falls and muscle weakness.   Gastrointestinal: Negative for abdominal pain and bowel incontinence.   Genitourinary: Negative for bladder incontinence and nocturia.   Neurological: Negative for disturbances in coordination, loss of balance and seizures.   Psychiatric/Behavioral: Negative for depression. The patient does not have insomnia.    Allergic/Immunologic: Negative for hives and persistent infections.     PHYSICAL EXAM:  GEN: A&Ox3, WD WN NAD  HEENT: WNL  CHEST: CTAB, no W/R/R  HEART: RRR, no M/R/G   ABD: Soft, NT ND, BS x4 QUADS  MS: Refer to previous note for detailed MS exam  NEURO: CN II-XII intact       The surgical consent was then reviewed with the patient, who agreed with all the contents of the consent form and it was signed. she was instructed to wait for a phone call from the anesthesia department prior to surgery to discuss past medical history, medications, and clearance. Also, informed she may be required to get additional testing per the anesthesia department prior to having surgery.     PHYSICAL THERAPY:  She was also instructed regarding physical therapy and will begin POD # 1-3. She is doing physical therapy at [facility].    POST OP CARE:instructions were reviewed including care of the wound and dressing after surgery and when she can shower.     PAIN MANAGEMENT: Sheila Urbina was also given a pain management regime, which includes the TENS unit given to her by Demian Hwang along with the education required for its use. She was also instructed regarding the Polar ice unit that will be in place after surgery and her postoperative pain medications.     PAIN MEDICATION:  Roxicodone (Oxycodone) 5 mg po q 4-6 hours prn pain   Phenergan 25 mg one p.o. q.4-6 hours prn nausea and vomiting.  Celebrex 200 mg BID with meals  Robaxin 500mg q 8 hours prn pain  Aspirin 81mg daily x 6  weeks for DVT prophylaxis starting on the evening after surgery.    Post op meds to be delivered bedside prior to discharge. Deliver to family if patient is in surgery at 5pm.   Patient denies history of seizures.     Patient will also use bilateral TEDs on lower extremities, SCDs during surgery, and early ambulation post-op. If the patient was previously taking 81mg baby aspirin, they were told to not take it will using the above stated aspirin and to restart the 81mg aspirin after completion of the aspirin dose.       Patient was also told to buy over the counter Prilosec medication and take it once daily for GI protection as long as they are taking NSAIDs or Aspirin.    DVT prophylaxis was discussed with the patient today including risk factors for developing DVTs and history of DVTs. The patient was asked if any specific recommendations were given from the doctor/s that did pre-operative surgical clearance.      Patient was asked if they were taking or using OCP pills or devices. If they answered yes, then they were instructed to stop using OCPs at this pre-operative appointment until 2 months post-op to help prevent DVT development. They understand that there are other forms of birth control that do not involve hormones. They expressed understanding that ignoring/not following this instruction could result in a DVT which could turn into a deadly pulmonary embolism.      The patient was told that narcotic pain medications may make them drowsy and instructions were given to not sign legal documents, drive or operate heavy machinery, cars, or equipment while under the influence of narcotic medications.     My supervising physician Nikhil Hunter MD was also present during the appointment.  He discussed with the patient all the potential complications, risks, and benefits of their upcoming surgery.    As there were no other questions to be asked, she was given my business card along with Nikhil Kearns MD  business card if she has any questions or concerns prior to surgery or in the postop period.

## 2023-07-31 NOTE — H&P (VIEW-ONLY)
Sheila Urbina  is here for a completion of her perioperative paperwork. she  Is scheduled to undergo:    Left knee  Arthroscopy   Possible use of allograft  Possible cartilage allograft  Possible revision tibial tubercle osteotomy  Possible medial quadriceps femoral ligament reconstruction   All other indicated procedures     on 08/02/2023.  She is a healthy individual and does not need clearance for this procedure.     Risks, indications and benefits of the surgical procedure were discussed with the patient. All questions with regard to surgery, rehab, expected return to functional activities, activities of daily living and recreational endeavors were answered to her satisfaction.    Discussed COVID-19 with the patient, they are aware of our current policies and procedures, were given the option of delaying surgery, and they elect to proceed.    Patient was informed and understands the risks of surgery are greater for patients with a current condition or history of heart disease, obesity, clotting disorders, recurrent infections, steroid use, current or past smoking, and factors such as sedentary lifestyle and noncompliance with medications, therapy or follow-up. The degree of the increased risk is hard to estimate with any degree of precision.    Once no other questions were asked, a brief history and physical exam was then performed.    PAST MEDICAL HISTORY:   Past Medical History:   Diagnosis Date    Aggressive behavior     post anesthesia    Essential hypertension 4/12/2021    JAN (generalized anxiety disorder) 9/18/2020    Granuloma of skin     left lower eye lid at age 4    Hypertension     Mild intermittent asthma without complication 9/18/2020    Obesity 5/7/2018    Ovarian cyst 2013    Recurrent dislocation of left patella     Vision abnormalities      PAST SURGICAL HISTORY:   Past Surgical History:   Procedure Laterality Date    EXAMINATION UNDER ANESTHESIA N/A 10/19/2022    Procedure: RECTAL EXAM  UNDER ANESTHESIA;  Surgeon: Nehemias Torres MD;  Location: [facility] OR;  Service: General;  Laterality: N/A;    EYE SURGERY      granuloma removal under left eye    INCISION AND DRAINAGE OF PERIRECTAL REGION N/A 12/1/2021    Procedure: INCISION AND DRAINAGE, PERIRECTAL REGION;  Surgeon: Nehemias Torres MD;  Location: [facility] OR;  Service: General;  Laterality: N/A;    KNEE ARTHROSCOPY      Osteotomy of tibia      PATELLA REALIGNMENT       FAMILY HISTORY:   Family History   Problem Relation Age of Onset    Diabetes Mother     Seizures Father      SOCIAL HISTORY:   Social History     Socioeconomic History    Marital status: Single   Tobacco Use    Smoking status: Never    Smokeless tobacco: Never   Substance and Sexual Activity    Alcohol use: No    Drug use: No    Sexual activity: Never       MEDICATIONS:   Current Outpatient Medications:     albuterol (PROVENTIL/VENTOLIN HFA) 90 mcg/actuation inhaler, INHALE  2 PUFFS INTO THE LUNGS EVERY 4 TO 6 HOURS AS NEEDED FOR WHEEZING, Disp: 18 g, Rfl: 5    ARIPiprazole (ABILIFY) 5 MG Tab, Take 5 mg by mouth., Disp: , Rfl:     lisinopriL (PRINIVIL,ZESTRIL) 5 MG tablet, Take 1 tablet (5 mg total) by mouth once daily., Disp: 30 tablet, Rfl: 1    norethindrone-e.estradiol-iron (LOESTRIN 24 FE ORAL), Take by mouth., Disp: , Rfl:     sertraline (ZOLOFT) 25 MG tablet, Take 25 mg by mouth., Disp: , Rfl:   ALLERGIES:   Review of patient's allergies indicates:   Allergen Reactions    Oxycontin [oxycodone]      Nightmares       Review of Systems   Constitution: Negative. Negative for chills, fever and night sweats.   HENT: Negative for congestion and headaches.    Eyes: Negative for blurred vision, left vision loss and right vision loss.   Cardiovascular: Negative for chest pain and syncope.   Respiratory: Negative for cough and shortness of breath.    Endocrine: Negative for polydipsia, polyphagia and polyuria.   Hematologic/Lymphatic: Negative for bleeding problem. Does not bruise/bleed easily.    Skin: Negative for dry skin, itching and rash.   Musculoskeletal: Negative for falls and muscle weakness.   Gastrointestinal: Negative for abdominal pain and bowel incontinence.   Genitourinary: Negative for bladder incontinence and nocturia.   Neurological: Negative for disturbances in coordination, loss of balance and seizures.   Psychiatric/Behavioral: Negative for depression. The patient does not have insomnia.    Allergic/Immunologic: Negative for hives and persistent infections.     PHYSICAL EXAM:  GEN: A&Ox3, WD WN NAD  HEENT: WNL  CHEST: CTAB, no W/R/R  HEART: RRR, no M/R/G   ABD: Soft, NT ND, BS x4 QUADS  MS: Refer to previous note for detailed MS exam  NEURO: CN II-XII intact       The surgical consent was then reviewed with the patient, who agreed with all the contents of the consent form and it was signed. she was instructed to wait for a phone call from the anesthesia department prior to surgery to discuss past medical history, medications, and clearance. Also, informed she may be required to get additional testing per the anesthesia department prior to having surgery.     PHYSICAL THERAPY:  She was also instructed regarding physical therapy and will begin POD # 1-3. She is doing physical therapy at [facility].    POST OP CARE:instructions were reviewed including care of the wound and dressing after surgery and when she can shower.     PAIN MANAGEMENT: Sheila Urbina was also given a pain management regime, which includes the TENS unit given to her by Demian Hwang along with the education required for its use. She was also instructed regarding the Polar ice unit that will be in place after surgery and her postoperative pain medications.     PAIN MEDICATION:  Roxicodone (Oxycodone) 5 mg po q 4-6 hours prn pain   Phenergan 25 mg one p.o. q.4-6 hours prn nausea and vomiting.  Celebrex 200 mg BID with meals  Robaxin 500mg q 8 hours prn pain  Aspirin 81mg daily x 6  weeks for DVT prophylaxis starting on the evening after surgery.    Post op meds to be delivered bedside prior to discharge. Deliver to family if patient is in surgery at 5pm.   Patient denies history of seizures.     Patient will also use bilateral TEDs on lower extremities, SCDs during surgery, and early ambulation post-op. If the patient was previously taking 81mg baby aspirin, they were told to not take it will using the above stated aspirin and to restart the 81mg aspirin after completion of the aspirin dose.       Patient was also told to buy over the counter Prilosec medication and take it once daily for GI protection as long as they are taking NSAIDs or Aspirin.    DVT prophylaxis was discussed with the patient today including risk factors for developing DVTs and history of DVTs. The patient was asked if any specific recommendations were given from the doctor/s that did pre-operative surgical clearance.      Patient was asked if they were taking or using OCP pills or devices. If they answered yes, then they were instructed to stop using OCPs at this pre-operative appointment until 2 months post-op to help prevent DVT development. They understand that there are other forms of birth control that do not involve hormones. They expressed understanding that ignoring/not following this instruction could result in a DVT which could turn into a deadly pulmonary embolism.      The patient was told that narcotic pain medications may make them drowsy and instructions were given to not sign legal documents, drive or operate heavy machinery, cars, or equipment while under the influence of narcotic medications.     My supervising physician Nikhil Hunter MD was also present during the appointment.  He discussed with the patient all the potential complications, risks, and benefits of their upcoming surgery.    As there were no other questions to be asked, she was given my business card along with Nikhil Kearns MD  business card if she has any questions or concerns prior to surgery or in the postop period.

## 2023-08-01 ENCOUNTER — PATIENT MESSAGE (OUTPATIENT)
Dept: SPORTS MEDICINE | Facility: CLINIC | Age: 23
End: 2023-08-01
Payer: MEDICAID

## 2023-08-01 ENCOUNTER — ANESTHESIA EVENT (OUTPATIENT)
Dept: SURGERY | Facility: HOSPITAL | Age: 23
End: 2023-08-01
Payer: MEDICAID

## 2023-08-01 ENCOUNTER — TELEPHONE (OUTPATIENT)
Dept: SPORTS MEDICINE | Facility: CLINIC | Age: 23
End: 2023-08-01
Payer: MEDICAID

## 2023-08-01 NOTE — ANESTHESIA PREPROCEDURE EVALUATION
[facility]  Anesthesia Pre-Operative Evaluation         Patient Name: Sheila Urbina  YOB: 2000  MRN: [id number]    SUBJECTIVE:     Pre-operative evaluation for Procedure(s) (LRB):  TUBERCLEPLASTY (Left)  ARTHROSCOPY, KNEE, WITH CHONDROPLASTY (Left)     08/01/2023    Sheila Urbina is a 22 y.o. female w/ a significant PMHx of HTN, obesity (BMI 35), moderate asthma, anxiety, bipolar affective disorder, KULWINDER, aggressive behavior post anesthesia, patellar instability.    Patient now presents for the above procedure(s) with Dr. Hunter.      Prev airway: Grade I view, direct      Review of patient's allergies indicates:   Allergen Reactions    Oxycontin [oxycodone]      Nightmares       Current Inpatient Medications:      No current facility-administered medications on file prior to encounter.     Current Outpatient Medications on File Prior to Encounter   Medication Sig Dispense Refill    albuterol (PROVENTIL/VENTOLIN HFA) 90 mcg/actuation inhaler INHALE  2 PUFFS INTO THE LUNGS EVERY 4 TO 6 HOURS AS NEEDED FOR WHEEZING 18 g 5    ARIPiprazole (ABILIFY) 5 MG Tab Take 5 mg by mouth.      lisinopriL (PRINIVIL,ZESTRIL) 5 MG tablet Take 1 tablet (5 mg total) by mouth once daily. 30 tablet 1    norethindrone-e.estradiol-iron (LOESTRIN 24 FE ORAL) Take by mouth.      sertraline (ZOLOFT) 25 MG tablet Take 25 mg by mouth.         Past Surgical History:   Procedure Laterality Date    EXAMINATION UNDER ANESTHESIA N/A 10/19/2022    Procedure: RECTAL EXAM UNDER ANESTHESIA;  Surgeon: Nehemias Torres MD;  Location: [facility] OR;  Service: General;  Laterality: N/A;    EYE SURGERY      granuloma removal under left eye    INCISION AND DRAINAGE OF PERIRECTAL REGION N/A 12/1/2021    Procedure: INCISION AND DRAINAGE, PERIRECTAL REGION;  Surgeon: Nehemias Torres MD;  Location: [facility] OR;  Service: General;  Laterality: N/A;    KNEE ARTHROSCOPY      Osteotomy of tibia      PATELLA  REALIGNMENT         Social History     Socioeconomic History    Marital status: Single   Tobacco Use    Smoking status: Never    Smokeless tobacco: Never   Substance and Sexual Activity    Alcohol use: No    Drug use: No    Sexual activity: Never       OBJECTIVE:     Vital Signs Range (Last 24H):         Significant Labs:  Lab Results   Component Value Date    WBC 3.12 (L) 07/26/2023    HGB 12.4 07/26/2023    HCT 36.7 (L) 07/26/2023    PLT 97 (L) 07/26/2023    CHOL 230 (H) 04/15/2021    TRIG 95 04/15/2021    HDL 42 (L) 04/15/2021    ALT 14 11/30/2021    AST 24 11/30/2021     07/12/2023    K 4.5 07/12/2023     07/12/2023    CREATININE 0.8 07/12/2023    BUN 14 07/12/2023    CO2 24 07/12/2023    TSH 1.30 04/15/2021    HGBA1C 5.1 08/25/2015    MICROALBUR 27.9 04/15/2021       EKG:   No results found for this or any previous visit.        ASSESSMENT/PLAN:         Pre-op Assessment    I have reviewed the Patient Summary Reports.     I have reviewed the Nursing Notes. I have reviewed the NPO Status.   I have reviewed the Medications.     Review of Systems  Anesthesia Hx:  No problems with previous Anesthesia  History of prior surgery of interest to airway management or planning: Previous anesthesia: General Airway issues documented on chart review include mask, easy  Denies Family Hx of Anesthesia complications.   Denies Personal Hx of Anesthesia complications.   Social:  Non-Smoker    Hematology/Oncology:     Oncology Normal    -- Anemia:   EENT/Dental:EENT/Dental Normal   Cardiovascular:   Hypertension    Pulmonary:   Asthma moderate    Renal/:  Renal/ Normal     Hepatic/GI:  Hepatic/GI Normal    Musculoskeletal:  Musculoskeletal Normal    Neurological:  Neurology Normal    Endocrine:  Obesity / BMI > 30  Psych:   anxiety depression      Anesthesia Hx:    H/O Aggressive behavior post anesthesia, no problems with most recent 4 surgeries, with surgery on 3/30/2015- Agitation postoperatively History of  prior surgery of interest to airway management or planning:  Previous anesthesia: General, Nerve Block 10/19/2022  Rectal exam under anesthesia with general anesthesia.  Procedure performed at an [facility].     for 8/3/2017  Left Knee Arthroscopy, Osteotomy.  Procedure performed at an [facility].  Airway issues documented on chart review include mask, easy, laryngeal mask airway used     Denies Family Hx of Anesthesia complications.   Personal Hx of Anesthesia complications               Paradoxical Response To Benzodiazapines      Social:  Non-Smoker, No Alcohol Use       Hematology/Oncology:     Oncology Normal    -- Anemia:               Hematology Comments: Iron deficiency anemia,  Thrombocytopenia, Vitamin D deficiency,  H&H = 12.4/ 36.7 on 7/26/2023,  PLT = 37,000                      EENT/Dental:   H/O granuloma to skin of left lower eye lid at age 4, S/P Removal, vision abnormalities          Cardiovascular:  Exercise tolerance: good   Hypertension     Denies Dysrhythmias.         Denies COATS.   H/O Atypical chest pain, EKG ordered, scheduled to be done on 8/7/2023                         Pulmonary:    Asthma mild  Denies Shortness of breath.   Mild intermittent asthma without complication               Renal/:  Renal/ Normal  Denies Chronic Renal Disease.                Hepatic/GI:      Denies GERD. Denies Liver Disease.  H/O Perineal abscess/ Fistula, S/P Rectal Examination under anesthesia, I&D perirectal region          Musculoskeletal:  Arthritis   Patellar instability of left knee, recurrent dislocations of left patella,  S/P Left Knee Arthroscopy, MPFL reconstruction with hamstring allograft, Tibial Tubercle Osteotomy and revision MPFL, Femoral Osteotomy to correct valgus malalignment,  H/O Patellofemoral arthritis of left knee,  Meniscal injury, left,               OB/GYN/PEDS:  H/O Ovarian cyst           Neurological:  Neurology Normal      Denies Headaches. Denies Seizures.                                 Endocrine:  Denies Diabetes. Denies Hypothyroidism.        Obesity / BMI > 30  Psych:  Psychiatric History anxiety  Generalized anxiety Disorder (JAN), Bipolar Affective Disorder              Physical Exam  General: Well nourished, Cooperative, Alert and Oriented    Airway:  Mallampati: II   Mouth Opening: Normal  TM Distance: Normal  Tongue: Normal  Neck ROM: Normal ROM    Dental:  Intact        Anesthesia Plan  Type of Anesthesia, risks & benefits discussed:    Anesthesia Type: Gen ETT, Gen Supraglottic Airway  Intra-op Monitoring Plan: Standard ASA Monitors  Post Op Pain Control Plan: multimodal analgesia, IV/PO Opioids PRN and peripheral nerve block  Induction:  IV  Airway Plan: Direct, Post-Induction  Informed Consent: Informed consent signed with the Patient and all parties understand the risks and agree with anesthesia plan.  All questions answered.   ASA Score: 2  Day of Surgery Review of History & Physical: H&P Update referred to the surgeon/provider.    Ready For Surgery From Anesthesia Perspective.     .

## 2023-08-02 ENCOUNTER — HOSPITAL ENCOUNTER (OUTPATIENT)
Facility: HOSPITAL | Age: 23
Discharge: HOME | End: 2023-08-02
Attending: STUDENT IN AN ORGANIZED HEALTH CARE EDUCATION/TRAINING PROGRAM | Admitting: STUDENT IN AN ORGANIZED HEALTH CARE EDUCATION/TRAINING PROGRAM
Payer: MEDICAID

## 2023-08-02 ENCOUNTER — ANESTHESIA (OUTPATIENT)
Dept: SURGERY | Facility: HOSPITAL | Age: 23
End: 2023-08-02
Payer: MEDICAID

## 2023-08-02 ENCOUNTER — TELEPHONE (OUTPATIENT)
Dept: PHARMACY | Facility: CLINIC | Age: 23
End: 2023-08-02
Payer: MEDICAID

## 2023-08-02 VITALS
OXYGEN SATURATION: 98 % | SYSTOLIC BLOOD PRESSURE: 122 MMHG | WEIGHT: 211 LBS | BODY MASS INDEX: 35.16 KG/M2 | DIASTOLIC BLOOD PRESSURE: 76 MMHG | TEMPERATURE: 98 F | RESPIRATION RATE: 19 BRPM | HEART RATE: 78 BPM | HEIGHT: 65 IN

## 2023-08-02 DIAGNOSIS — M22.42 CHONDROMALACIA PATELLAE OF LEFT KNEE: ICD-10-CM

## 2023-08-02 DIAGNOSIS — M25.362 PATELLAR INSTABILITY OF LEFT KNEE: ICD-10-CM

## 2023-08-02 PROBLEM — T84.84XA PAINFUL ORTHOPAEDIC HARDWARE: Status: ACTIVE | Noted: 2023-08-02

## 2023-08-02 LAB
B-HCG UR QL: NEGATIVE
CTP QC/QA: YES

## 2023-08-02 PROCEDURE — 20680 REMOVAL OF IMPLANT DEEP: CPT | Mod: 59,,, | Performed by: STUDENT IN AN ORGANIZED HEALTH CARE EDUCATION/TRAINING PROGRAM

## 2023-08-02 PROCEDURE — 25000003 PHARM REV CODE 250: Performed by: PHYSICIAN ASSISTANT

## 2023-08-02 PROCEDURE — 64448 LEFT ADDUCTOR CANAL CATHETER: ICD-10-PCS | Mod: 59,LT,, | Performed by: SURGERY

## 2023-08-02 PROCEDURE — 63600175 PHARM REV CODE 636 W HCPCS

## 2023-08-02 PROCEDURE — 27415 PR OSTEOCHONDRAL KNEE ALLOGRAFT: ICD-10-PCS | Mod: LT,,, | Performed by: STUDENT IN AN ORGANIZED HEALTH CARE EDUCATION/TRAINING PROGRAM

## 2023-08-02 PROCEDURE — D9220A PRA ANESTHESIA: Mod: ,,, | Performed by: SURGERY

## 2023-08-02 PROCEDURE — 81025 URINE PREGNANCY TEST: CPT | Performed by: ORTHOPAEDIC SURGERY

## 2023-08-02 PROCEDURE — 71000015 HC POSTOP RECOV 1ST HR: Performed by: STUDENT IN AN ORGANIZED HEALTH CARE EDUCATION/TRAINING PROGRAM

## 2023-08-02 PROCEDURE — 63600175 PHARM REV CODE 636 W HCPCS: Performed by: STUDENT IN AN ORGANIZED HEALTH CARE EDUCATION/TRAINING PROGRAM

## 2023-08-02 PROCEDURE — 37000008 HC ANESTHESIA 1ST 15 MINUTES: Performed by: STUDENT IN AN ORGANIZED HEALTH CARE EDUCATION/TRAINING PROGRAM

## 2023-08-02 PROCEDURE — 37000009 HC ANESTHESIA EA ADD 15 MINS: Performed by: STUDENT IN AN ORGANIZED HEALTH CARE EDUCATION/TRAINING PROGRAM

## 2023-08-02 PROCEDURE — 20680 PR REMOVAL DEEP IMPLANT: ICD-10-PCS | Mod: 59,,, | Performed by: STUDENT IN AN ORGANIZED HEALTH CARE EDUCATION/TRAINING PROGRAM

## 2023-08-02 PROCEDURE — 01400 ANES OPN/ARTHRS KNEE JT NOS: CPT | Performed by: STUDENT IN AN ORGANIZED HEALTH CARE EDUCATION/TRAINING PROGRAM

## 2023-08-02 PROCEDURE — 36000710: Performed by: STUDENT IN AN ORGANIZED HEALTH CARE EDUCATION/TRAINING PROGRAM

## 2023-08-02 PROCEDURE — 27415 OSTEOCHONDRAL KNEE ALLOGRAFT: CPT | Mod: AS,LT,, | Performed by: ORTHOPAEDIC SURGERY

## 2023-08-02 PROCEDURE — 27415 PR OSTEOCHONDRAL KNEE ALLOGRAFT: ICD-10-PCS | Mod: AS,LT,, | Performed by: ORTHOPAEDIC SURGERY

## 2023-08-02 PROCEDURE — 64448 NJX AA&/STRD FEM NRV NFS IMG: CPT | Mod: 59,LT | Performed by: SURGERY

## 2023-08-02 PROCEDURE — D9220A PRA ANESTHESIA: ICD-10-PCS | Mod: ,,, | Performed by: SURGERY

## 2023-08-02 PROCEDURE — 25000003 PHARM REV CODE 250

## 2023-08-02 PROCEDURE — 27201423 OPTIME MED/SURG SUP & DEVICES STERILE SUPPLY: Performed by: STUDENT IN AN ORGANIZED HEALTH CARE EDUCATION/TRAINING PROGRAM

## 2023-08-02 PROCEDURE — 27415 OSTEOCHONDRAL KNEE ALLOGRAFT: CPT | Mod: LT,,, | Performed by: STUDENT IN AN ORGANIZED HEALTH CARE EDUCATION/TRAINING PROGRAM

## 2023-08-02 PROCEDURE — 63600175 PHARM REV CODE 636 W HCPCS: Performed by: SURGERY

## 2023-08-02 PROCEDURE — 71000033 HC RECOVERY, INTIAL HOUR: Performed by: STUDENT IN AN ORGANIZED HEALTH CARE EDUCATION/TRAINING PROGRAM

## 2023-08-02 PROCEDURE — 25000003 PHARM REV CODE 250: Performed by: ORTHOPAEDIC SURGERY

## 2023-08-02 PROCEDURE — 71000039 HC RECOVERY, EACH ADD'L HOUR: Performed by: STUDENT IN AN ORGANIZED HEALTH CARE EDUCATION/TRAINING PROGRAM

## 2023-08-02 PROCEDURE — 99900035 HC TECH TIME PER 15 MIN (STAT)

## 2023-08-02 PROCEDURE — 36000711: Performed by: STUDENT IN AN ORGANIZED HEALTH CARE EDUCATION/TRAINING PROGRAM

## 2023-08-02 PROCEDURE — 25000003 PHARM REV CODE 250: Performed by: STUDENT IN AN ORGANIZED HEALTH CARE EDUCATION/TRAINING PROGRAM

## 2023-08-02 PROCEDURE — 94761 N-INVAS EAR/PLS OXIMETRY MLT: CPT

## 2023-08-02 PROCEDURE — C1762 CONN TISS, HUMAN(INC FASCIA): HCPCS | Performed by: STUDENT IN AN ORGANIZED HEALTH CARE EDUCATION/TRAINING PROGRAM

## 2023-08-02 DEVICE — ALLOGRAFT CARTIMAX VIABLE CART: Type: IMPLANTABLE DEVICE | Site: KNEE | Status: FUNCTIONAL

## 2023-08-02 RX ORDER — CELECOXIB 200 MG/1
400 CAPSULE ORAL
Status: COMPLETED | OUTPATIENT
Start: 2023-08-02 | End: 2023-08-02

## 2023-08-02 RX ORDER — ONDANSETRON 2 MG/ML
4 INJECTION INTRAMUSCULAR; INTRAVENOUS EVERY 12 HOURS PRN
Status: DISCONTINUED | OUTPATIENT
Start: 2023-08-02 | End: 2023-08-02 | Stop reason: HOSPADM

## 2023-08-02 RX ORDER — ONDANSETRON 2 MG/ML
4 INJECTION INTRAMUSCULAR; INTRAVENOUS DAILY PRN
Status: DISCONTINUED | OUTPATIENT
Start: 2023-08-02 | End: 2023-08-02 | Stop reason: HOSPADM

## 2023-08-02 RX ORDER — HYDROCODONE BITARTRATE AND ACETAMINOPHEN 5; 325 MG/1; MG/1
1 TABLET ORAL EVERY 4 HOURS PRN
Status: DISCONTINUED | OUTPATIENT
Start: 2023-08-02 | End: 2023-08-02 | Stop reason: HOSPADM

## 2023-08-02 RX ORDER — METOCLOPRAMIDE HYDROCHLORIDE 5 MG/ML
5 INJECTION INTRAMUSCULAR; INTRAVENOUS EVERY 6 HOURS PRN
Status: DISCONTINUED | OUTPATIENT
Start: 2023-08-02 | End: 2023-08-02 | Stop reason: HOSPADM

## 2023-08-02 RX ORDER — DEXMEDETOMIDINE HYDROCHLORIDE 100 UG/ML
INJECTION, SOLUTION INTRAVENOUS
Status: DISCONTINUED | OUTPATIENT
Start: 2023-08-02 | End: 2023-08-02

## 2023-08-02 RX ORDER — ONDANSETRON 2 MG/ML
INJECTION INTRAMUSCULAR; INTRAVENOUS
Status: DISCONTINUED | OUTPATIENT
Start: 2023-08-02 | End: 2023-08-02

## 2023-08-02 RX ORDER — KETAMINE HCL IN 0.9 % NACL 50 MG/5 ML
SYRINGE (ML) INTRAVENOUS
Status: DISCONTINUED | OUTPATIENT
Start: 2023-08-02 | End: 2023-08-02

## 2023-08-02 RX ORDER — CEFAZOLIN SODIUM 1 G/3ML
INJECTION, POWDER, FOR SOLUTION INTRAMUSCULAR; INTRAVENOUS
Status: DISCONTINUED | OUTPATIENT
Start: 2023-08-02 | End: 2023-08-02

## 2023-08-02 RX ORDER — SODIUM CHLORIDE 9 MG/ML
INJECTION, SOLUTION INTRAVENOUS CONTINUOUS
Status: DISCONTINUED | OUTPATIENT
Start: 2023-08-02 | End: 2023-08-02 | Stop reason: HOSPADM

## 2023-08-02 RX ORDER — ROPIVACAINE HYDROCHLORIDE 5 MG/ML
INJECTION, SOLUTION EPIDURAL; INFILTRATION; PERINEURAL
Status: COMPLETED | OUTPATIENT
Start: 2023-08-02 | End: 2023-08-02

## 2023-08-02 RX ORDER — DEXAMETHASONE SODIUM PHOSPHATE 4 MG/ML
INJECTION, SOLUTION INTRA-ARTICULAR; INTRALESIONAL; INTRAMUSCULAR; INTRAVENOUS; SOFT TISSUE
Status: DISCONTINUED | OUTPATIENT
Start: 2023-08-02 | End: 2023-08-02

## 2023-08-02 RX ORDER — SODIUM CHLORIDE 0.9 % (FLUSH) 0.9 %
10 SYRINGE (ML) INJECTION
Status: DISCONTINUED | OUTPATIENT
Start: 2023-08-02 | End: 2023-08-02 | Stop reason: HOSPADM

## 2023-08-02 RX ORDER — ACETAMINOPHEN 325 MG/1
650 TABLET ORAL EVERY 4 HOURS PRN
Status: DISCONTINUED | OUTPATIENT
Start: 2023-08-02 | End: 2023-08-02 | Stop reason: HOSPADM

## 2023-08-02 RX ORDER — FENTANYL CITRATE 50 UG/ML
25 INJECTION, SOLUTION INTRAMUSCULAR; INTRAVENOUS EVERY 5 MIN PRN
Status: COMPLETED | OUTPATIENT
Start: 2023-08-02 | End: 2023-08-02

## 2023-08-02 RX ORDER — LIDOCAINE HYDROCHLORIDE 20 MG/ML
INJECTION, SOLUTION EPIDURAL; INFILTRATION; INTRACAUDAL; PERINEURAL
Status: DISCONTINUED | OUTPATIENT
Start: 2023-08-02 | End: 2023-08-02

## 2023-08-02 RX ORDER — FENTANYL CITRATE 50 UG/ML
100 INJECTION, SOLUTION INTRAMUSCULAR; INTRAVENOUS ONCE
Status: COMPLETED | OUTPATIENT
Start: 2023-08-02 | End: 2023-08-02

## 2023-08-02 RX ORDER — ACETAMINOPHEN 500 MG
1000 TABLET ORAL
Status: COMPLETED | OUTPATIENT
Start: 2023-08-02 | End: 2023-08-02

## 2023-08-02 RX ORDER — EPINEPHRINE 1 MG/ML
INJECTION INTRAMUSCULAR; INTRAVENOUS; SUBCUTANEOUS
Status: DISCONTINUED | OUTPATIENT
Start: 2023-08-02 | End: 2023-08-02 | Stop reason: HOSPADM

## 2023-08-02 RX ORDER — MIDAZOLAM HYDROCHLORIDE 1 MG/ML
4 INJECTION INTRAMUSCULAR; INTRAVENOUS
Status: DISCONTINUED | OUTPATIENT
Start: 2023-08-02 | End: 2023-08-02 | Stop reason: HOSPADM

## 2023-08-02 RX ORDER — OXYCODONE HYDROCHLORIDE 5 MG/1
10 TABLET ORAL EVERY 4 HOURS PRN
Status: DISCONTINUED | OUTPATIENT
Start: 2023-08-02 | End: 2023-08-02 | Stop reason: HOSPADM

## 2023-08-02 RX ORDER — PROPOFOL 10 MG/ML
VIAL (ML) INTRAVENOUS
Status: DISCONTINUED | OUTPATIENT
Start: 2023-08-02 | End: 2023-08-02

## 2023-08-02 RX ORDER — PHENYLEPHRINE HYDROCHLORIDE 10 MG/ML
INJECTION INTRAVENOUS
Status: DISCONTINUED | OUTPATIENT
Start: 2023-08-02 | End: 2023-08-02

## 2023-08-02 RX ORDER — FENTANYL CITRATE 50 UG/ML
INJECTION, SOLUTION INTRAMUSCULAR; INTRAVENOUS
Status: DISCONTINUED | OUTPATIENT
Start: 2023-08-02 | End: 2023-08-02

## 2023-08-02 RX ORDER — ROPIVACAINE HYDROCHLORIDE 2 MG/ML
INJECTION, SOLUTION EPIDURAL; INFILTRATION; PERINEURAL CONTINUOUS
Status: DISCONTINUED | OUTPATIENT
Start: 2023-08-02 | End: 2023-08-02 | Stop reason: HOSPADM

## 2023-08-02 RX ORDER — MIDAZOLAM HYDROCHLORIDE 1 MG/ML
INJECTION INTRAMUSCULAR; INTRAVENOUS
Status: DISCONTINUED | OUTPATIENT
Start: 2023-08-02 | End: 2023-08-02

## 2023-08-02 RX ORDER — VANCOMYCIN HYDROCHLORIDE 1 G/20ML
INJECTION, POWDER, LYOPHILIZED, FOR SOLUTION INTRAVENOUS
Status: DISCONTINUED | OUTPATIENT
Start: 2023-08-02 | End: 2023-08-02 | Stop reason: HOSPADM

## 2023-08-02 RX ORDER — ROCURONIUM BROMIDE 10 MG/ML
INJECTION, SOLUTION INTRAVENOUS
Status: DISCONTINUED | OUTPATIENT
Start: 2023-08-02 | End: 2023-08-02

## 2023-08-02 RX ADMIN — ROPIVACAINE HYDROCHLORIDE 10 ML: 5 INJECTION EPIDURAL; INFILTRATION; PERINEURAL at 10:08

## 2023-08-02 RX ADMIN — SODIUM CHLORIDE, SODIUM GLUCONATE, SODIUM ACETATE, POTASSIUM CHLORIDE, MAGNESIUM CHLORIDE, SODIUM PHOSPHATE, DIBASIC, AND POTASSIUM PHOSPHATE: .53; .5; .37; .037; .03; .012; .00082 INJECTION, SOLUTION INTRAVENOUS at 12:08

## 2023-08-02 RX ADMIN — ONDANSETRON 4 MG: 2 INJECTION INTRAMUSCULAR; INTRAVENOUS at 12:08

## 2023-08-02 RX ADMIN — OXYCODONE HYDROCHLORIDE 10 MG: 5 TABLET ORAL at 01:08

## 2023-08-02 RX ADMIN — PROPOFOL 180 MG: 10 INJECTION, EMULSION INTRAVENOUS at 11:08

## 2023-08-02 RX ADMIN — DEXAMETHASONE SODIUM PHOSPHATE 4 MG: 4 INJECTION, SOLUTION INTRAMUSCULAR; INTRAVENOUS at 11:08

## 2023-08-02 RX ADMIN — PHENYLEPHRINE HYDROCHLORIDE 100 MCG: 10 INJECTION INTRAVENOUS at 01:08

## 2023-08-02 RX ADMIN — DEXMEDETOMIDINE 12 MCG: 100 INJECTION, SOLUTION, CONCENTRATE INTRAVENOUS at 11:08

## 2023-08-02 RX ADMIN — ROCURONIUM BROMIDE 20 MG: 10 INJECTION INTRAVENOUS at 12:08

## 2023-08-02 RX ADMIN — FENTANYL CITRATE 100 MCG: 50 INJECTION, SOLUTION INTRAMUSCULAR; INTRAVENOUS at 11:08

## 2023-08-02 RX ADMIN — SUGAMMADEX 400 MG: 100 INJECTION, SOLUTION INTRAVENOUS at 12:08

## 2023-08-02 RX ADMIN — LIDOCAINE HYDROCHLORIDE 80 MG: 20 INJECTION, SOLUTION EPIDURAL; INFILTRATION; INTRACAUDAL; PERINEURAL at 11:08

## 2023-08-02 RX ADMIN — Medication 20 MG: at 11:08

## 2023-08-02 RX ADMIN — PHENYLEPHRINE HYDROCHLORIDE 50 MCG: 10 INJECTION INTRAVENOUS at 12:08

## 2023-08-02 RX ADMIN — MIDAZOLAM 2 MG: 1 INJECTION INTRAMUSCULAR; INTRAVENOUS at 10:08

## 2023-08-02 RX ADMIN — Medication: at 01:08

## 2023-08-02 RX ADMIN — ONDANSETRON 4 MG: 2 INJECTION INTRAMUSCULAR; INTRAVENOUS at 03:08

## 2023-08-02 RX ADMIN — PHENYLEPHRINE HYDROCHLORIDE 100 MCG: 10 INJECTION INTRAVENOUS at 12:08

## 2023-08-02 RX ADMIN — DEXMEDETOMIDINE 8 MCG: 100 INJECTION, SOLUTION, CONCENTRATE INTRAVENOUS at 12:08

## 2023-08-02 RX ADMIN — FENTANYL CITRATE 25 MCG: 50 INJECTION INTRAMUSCULAR; INTRAVENOUS at 01:08

## 2023-08-02 RX ADMIN — CEFAZOLIN 2 G: 330 INJECTION, POWDER, FOR SOLUTION INTRAMUSCULAR; INTRAVENOUS at 11:08

## 2023-08-02 RX ADMIN — SODIUM CHLORIDE: 9 INJECTION, SOLUTION INTRAVENOUS at 09:08

## 2023-08-02 RX ADMIN — SODIUM CHLORIDE: 0.9 INJECTION, SOLUTION INTRAVENOUS at 10:08

## 2023-08-02 RX ADMIN — CELECOXIB 400 MG: 200 CAPSULE ORAL at 09:08

## 2023-08-02 RX ADMIN — ROCURONIUM BROMIDE 80 MG: 10 INJECTION INTRAVENOUS at 11:08

## 2023-08-02 RX ADMIN — FENTANYL CITRATE 25 MCG: 50 INJECTION INTRAMUSCULAR; INTRAVENOUS at 02:08

## 2023-08-02 RX ADMIN — FENTANYL CITRATE 100 MCG: 50 INJECTION INTRAMUSCULAR; INTRAVENOUS at 10:08

## 2023-08-02 RX ADMIN — ACETAMINOPHEN 1000 MG: 500 TABLET ORAL at 09:08

## 2023-08-02 RX ADMIN — MIDAZOLAM HYDROCHLORIDE 2 MG: 1 INJECTION, SOLUTION INTRAMUSCULAR; INTRAVENOUS at 10:08

## 2023-08-02 NOTE — BRIEF OP NOTE
[facility] - Surgery (Hospital)  Brief Operative Note    Surgery Date: 8/2/2023     Surgeon(s) and Role:     * Nikhil Hunter MD - Primary    Assisting Surgeon: None    Pre-op Diagnosis:  Patellar instability of left knee [M25.362]    Post-op Diagnosis:  Post-Op Diagnosis Codes:     * Patellar instability of left knee [M25.362]    Procedure(s) (LRB):  ARTHROSCOPY, KNEE, WITH CHONDROPLASTY (Left)  EXCISION, PLICA, KNEE, ARTHROSCOPIC (Left)  REMOVAL, HARDWARE, LOWER EXTREMITY (Left)  INSERTION,ALLOGRAFT,OSTEOCHONDRAL (Left)    Anesthesia: Regional    Operative Findings: patella chondromalacia    Estimated Blood Loss: * No values recorded between 8/2/2023 11:28 AM and 8/2/2023 12:59 PM *         Specimens:   Specimen (24h ago, onward)      None              Discharge Note    OUTCOME: Patient tolerated treatment/procedure well without complication and is now ready for discharge.    DISPOSITION: Home or Self Care    FINAL DIAGNOSIS:  Chondromalacia of left patella    FOLLOWUP: In clinic    DISCHARGE INSTRUCTIONS:    Discharge Procedure Orders   Diet general     Leave dressing on - Keep it clean, dry, and intact until clinic visit     Call MD for:  temperature >100.4     Call MD for:  persistent nausea and vomiting     Call MD for:  severe uncontrolled pain     Call MD for:  difficulty breathing, headache or visual disturbances     Call MD for:  redness, tenderness, or signs of infection (pain, swelling, redness, odor or green/yellow discharge around incision site)     Call MD for:  hives     Call MD for:  persistent dizziness or light-headedness     Call MD for:  extreme fatigue

## 2023-08-02 NOTE — PROGRESS NOTES
Pre op completed. All concerns addressed. Patient belongings given to mother. Bed in lowest position. Call light within reach. Family at beside.

## 2023-08-02 NOTE — PLAN OF CARE
Patient ready to be discharged. VSS and in no distress.    Instructions given, patient verbalizes understanding. Pain assessed and addressed. Tolerates liquids by mouth with no nausea and vomiting.      Ordered DME provided to patient. Verbal and written instructions were given to the patient and a competent caregiver on proper usage. Also educated on the risk of falling if DME is not used/not used properly. All parties verbalized understanding.     Bedside pharmacy

## 2023-08-02 NOTE — ANESTHESIA PROCEDURE NOTES
Intubation    Date/Time: 8/2/2023 11:03 AM    Performed by: Eliana Moore MD  Authorized by: Omar Leonardo MD    Intubation:     Induction:  Intravenous    Intubated:  Postinduction    Mask Ventilation:  Easy mask    Attempts:  2    Attempted By:  Resident anesthesiologist    Method of Intubation:  Direct    Blade:  Flores 2    Laryngeal View Grade: Grade IV - neither epiglottis nor glottis seen      Attempted By (2nd Attempt):  Staff anesthesiologist    Method of Intubation (2nd Attempt):  Direct    Blade (2nd Attempt):  Flores 2    Laryngeal View Grade (2nd Attempt): Grade I - full view of cords      Difficult Airway Encountered?: No      Complications:  None    Airway Device:  Oral endotracheal tube    Airway Device Size:  7.0    Style/Cuff Inflation:  Cuffed (inflated to minimal occlusive pressure)    Tube secured:  22    Secured at:  The lips    Placement Verified By:  Capnometry    Complicating Factors:  None    Findings Post-Intubation:  BS equal bilateral and atraumatic/condition of teeth unchanged

## 2023-08-02 NOTE — OP NOTE
DATE OF PROCEDURE: 08/02/2023    SURGEON: Nikhil Hunter MD    ASSISTANT:  Garo PEREZ  There was no available resident or fellow in the services of physician assistant were required    PREOPERATIVE DIAGNOSIS:    Left patellofemoral chondromalacia  Painful orthopedic hardware from the distal femoral osteotomy plate    POSTOPERATIVE DIAGNOSIS:   Left patellofemoral chondromalacia  Painful orthopedic hardware from the distal femoral osteotomy plate  Left medial plica      PROCEDURE PERFORMED:   Left patella cartilage allograft (Cartimax)  Left knee arthroscopic plica excision  Left distal femur hardware removal      ANESTHESIA: General with adductor canal block and catheter    BLOOD LOSS: 20cc.     IMPLANTS:  Implant Name Type Inv. Item Serial No.  Lot No. LRB No. Used Action   [id number]  ALLOGRAFT CARTIMAX VIABLE CART [id number] MTF  Left 1 Implanted   [id number]  ALLOGRAFT CARTIMAX VIABLE CART [id number] [id number]   1 Implanted         DRAINS: None.     COMPLICATIONS: None.     INTRAOPERATIVE FINDINGS:  Exam under anesthesia with range of motion 5/0/140, equal to contralateral knee.  Negative Lachman's, negative posterior drawer, stable to varus and valgus stress at 0 and 30°.  Three quadrant lateral and 2 quadrant medial patellar translation.  Previous surgical incisions are well healed.  Diagnostic arthroscopy revealed intact cartilage in the medial and the lateral compartment with intact medial and lateral menisci.  The cruciate ligaments were intact.  There was diffuse grade 3 with focal grade 4 chondromalacia to the patella.  On the superior lateral aspect of the knee there was a portion of the distal femoral plate that was intra-articular.    BRIEF INDICATION OF MEDICAL NECESSITY:   Sheila Urbina is a 22 y.o. female is well known to our clinic with a long history of left knee pain.  she had advanced imaging, including X-rays and MRI, which  demonstrated patellofemoral chondromalacia.  After discussion with the patient was determined the best course of action would be to proceed to the operating room for knee arthroscopy with possible hardware removal and cartilage allografting of patella.  Procedures, as well as the risks, benefits, and alternatives, were explained to the patient in great detail all questions were answered.  Informed consent was obtained.      PROCEDURE IN DETAIL:   The patient was identified in the preoperative holding area and the site was marked.   a block was administered by the anesthesia team and she was taken to the operating room where there placed in the supine position on the operating room table.  Anesthetic agents were then administered.  Exam under anesthesia performed, see the detailed findings above.  A nonsterile tourniquet was then applied to the upper thigh.  The left leg was then prepped and draped in standard sterile fashion.  A time-out was undertaken around the patient, site, surgery, surgeon, and administration preoperative antibiotics.  All was agreed upon we proceeded.    A standard lateral arthroscopic portal was established and the joint was insufflated with saline solution.  The scope was inserted into the notch and in the suprapatellar pouch and a diagnostic arthroscopy was then performed.      The suprapatellar pouch did have a plica.  This was excised using a combination of the shaver and electrocautery.      There were not noted to be loose bodies.     The camera was then moved into the notch in the ACL was examined probed.  It was noted to be intact.  A small amount of the prepatellar fat pad was removed using a combination of the shaver and electrocautery.    The leg was then placed in a figure 4 position and the lateral compartment was examined.  The lateral meniscus was thoroughly examined and noted to be intact.  The root of the lateral meniscus was probed and noted to be intact and stable.   The  lateral tibial plateau and lateral femoral condyle were examined for cartilage deficits.  There were noted to be no chondral damage.    A valgus stress was then applied to the knee and the camera was placed into the medial compartment and this compartment was examined.  The medial meniscus was thoroughly examined and noted to be intact.  The root of the medial meniscus was probed and noted to be intact and stable.   The medial tibial plateau and medial femoral condyle were examined for cartilage deficits.  There were noted to be no chondral damage.    The tracking of the patella was examined and noted to track within the trochlear groove.  The undersurface of the patella in the trochlear groove were examined for cartilage defects.  There were noted to be diffuse grade 3 with focal grade 4 chondromalacia.  Additionally, the superior lateral aspect of the femur the distal femoral osteotomy plate was visualized to be intra-articular with possible articulation with the lateral facet of patella.    At this point the decision was made to proceed with hardware removal distal femoral osteotomy plate.  The arthroscopic instruments were removed.  A 15 cm longitudinal incision was made at her previous lateral incision.  Sharp dissection was carried through skin subcutaneous tissue and hemostasis was achieved with electrocautery.  A longitudinal incision was then made in the IT band in line with the skin incision.  Electrocautery was then used to expose the plate.  The soft tissues were removed from the plate in once the entire plate was visualized we began by removing the screws with a hex screwdriver.  I began proximally and worked distally.  The 4 shaft and 3 distal screws were removed with ease.  An osteotome was used to gently elevate the plate.  Fluoroscopy was brought in and AP and lateral imaging revealed complete hardware removal and no evidence of new injury or fracture.  This was then copiously irrigated with saline  solution.  The superior aspect of the distal plate wear was intra-articular, this arthrotomy was closed with #1 Vicryl suture.  The IT band was then closed with #1 Vicryl suture in interrupted fashion.  1 g of vancomycin powder was then distributed throughout the incision.  The deep fascial layer was then closed with 0 Vicryl suture in interrupted fashion.  The skin was then closed with 3-0 Vicryl suture followed by 4-0 Monocryl and Dermabond.    We are now ready to proceed with the cartilage allografting of the patella.  Through her prior MPFL incision, we carried sharp dissection through skin subcutaneous tissue and achieved hemostasis with electrocautery.  A standard medial parapatellar arthrotomy was performed.  The patella was then everted.  The lesion was visualized and a fresh knife was used to define the borders.  A ring curette was used to debride the nonviable cartilage down to subchondral bone.  Once we had a well contained lesion with sharp and vertical borders we are ready for the allograft.  The cartilage allograft was prepared on the back table in standard fashion.  This was then placed within the defect until it was flush with the native articular cartilage.  At this point the procedure was complete and the patella was reduced.  The arthrotomy was closed with #1 Vicryl suture in interrupted fashion with imbrication of the medial patellofemoral ligament.  The deep fascial layer was closed with 0 Vicryl suture.  The skin was closed with 3-0 Vicryl suture and Dermabond.    Sterile dressings were then applied.  The patient was awakened from the anesthetic agents.  The procedure was complete without complication and tolerated well by the patient.  Was then taken to the postanesthesia care unit in stable condition    POSTOPERATIVE PLAN:  She will follow the patellofemoral cartilage restoration PT protocol.  Nonweightbearing.  Return to clinic in 2 weeks.

## 2023-08-02 NOTE — ANESTHESIA PROCEDURE NOTES
Left Adductor Canal Catheter    Patient location during procedure: pre-op   Block not for primary anesthetic.  Reason for block: at surgeon's request and post-op pain management   Post-op Pain Location: left knee   Start time: 8/2/2023 10:01 AM  Timeout: 8/2/2023 10:00 AM   End time: 8/2/2023 10:14 AM    Staffing  Authorizing Provider: Omar Leonardo MD  Performing Provider: Omar Leonardo MD    Staffing  Performed by: Omar Leonardo MD  Authorized by: Omar Leonardo MD    Preanesthetic Checklist  Completed: patient identified, IV checked, site marked, risks and benefits discussed, surgical consent, monitors and equipment checked, pre-op evaluation and timeout performed  Peripheral Block  Patient position: supine  Prep: ChloraPrep and site prepped and draped  Patient monitoring: heart rate, cardiac monitor, continuous pulse ox, continuous capnometry and frequent blood pressure checks  Block type: adductor canal  Laterality: left  Injection technique: continuous  Needle  Needle type: Tuohy   Needle gauge: 17 G  Needle length: 3.5 in  Needle localization: anatomical landmarks and ultrasound guidance  Catheter type: spring wound  Catheter size: 19 G  Test dose: lidocaine 1.5% with Epi 1-to-200,000 and negative   -ultrasound image captured on disc.  Assessment  Injection assessment: negative aspiration, negative parasthesia and local visualized surrounding nerve  Paresthesia pain: none  Heart rate change: no  Slow fractionated injection: yes  Pain Tolerance: comfortable throughout block and no complaints  Medications:    Medications: ropivacaine (NAROPIN) injection 0.5% - Perineural   10 mL - 8/2/2023 10:07:00 AM    Additional Notes  VSS.  [facility] RN monitoring vitals throughout procedure.  Patient tolerated procedure well.  20 cc of 0.25% ropivacaine with epi 1:300k administered for the block.

## 2023-08-02 NOTE — TRANSFER OF CARE
"Anesthesia Transfer of Care Note    Patient: Sheila Urbina    Procedure(s) Performed: Procedure(s) (LRB):  ARTHROSCOPY, KNEE, WITH CHONDROPLASTY (Left)  EXCISION, PLICA, KNEE, ARTHROSCOPIC (Left)  REMOVAL, HARDWARE, LOWER EXTREMITY (Left)  INSERTION,ALLOGRAFT,OSTEOCHONDRAL (Left)    Patient location: PACU    Anesthesia Type: general    Transport from OR: Transported from OR on 6-10 L/min O2 by face mask with adequate spontaneous ventilation    Post pain: adequate analgesia    Post assessment: no apparent anesthetic complications and tolerated procedure well    Post vital signs: stable    Level of consciousness: awake and alert    Nausea/Vomiting: no nausea/vomiting    Complications: none    Transfer of care protocol was followed      Last vitals:   Visit Vitals  /75 (BP Location: Left arm, Patient Position: Lying)   Pulse 72   Temp 36.8 °C (98.3 °F) (Skin)   Resp 18   Ht 5' 5" (1.651 m)   Wt 95.7 kg (211 lb)   LMP 07/09/2023   SpO2 100%   Breastfeeding No   BMI 35.11 kg/m²     "

## 2023-08-02 NOTE — TELEPHONE ENCOUNTER
DOCUMENTATION ONLY  Celecoxib 200mg capsules  Approval date: 8/1/2023 to 7/31/2024   Case ID# [id number]

## 2023-08-03 ENCOUNTER — PATIENT MESSAGE (OUTPATIENT)
Dept: SPORTS MEDICINE | Facility: CLINIC | Age: 23
End: 2023-08-03
Payer: MEDICAID

## 2023-08-03 NOTE — ANESTHESIA POSTPROCEDURE EVALUATION
Anesthesia Post Evaluation    Patient: Sheila Urbina    Procedure(s) Performed: Procedure(s) (LRB):  ARTHROSCOPY, KNEE, WITH CHONDROPLASTY (Left)  EXCISION, PLICA, KNEE, ARTHROSCOPIC (Left)  REMOVAL, HARDWARE, LOWER EXTREMITY (Left)  INSERTION,ALLOGRAFT,OSTEOCHONDRAL (Left)    Final Anesthesia Type: general      Patient location during evaluation: PACU  Patient participation: Yes- Able to Participate  Level of consciousness: awake and alert  Post-procedure vital signs: reviewed and stable  Pain management: adequate  Airway patency: patent    PONV status at discharge: No PONV  Anesthetic complications: no      Cardiovascular status: blood pressure returned to baseline  Respiratory status: unassisted  Hydration status: euvolemic  Follow-up not needed.          Vitals Value Taken Time   /76 08/02/23 1447   Temp 36.5 °C (97.7 °F) 08/02/23 1445   Pulse 79 08/02/23 1452   Resp 19 08/02/23 1452   SpO2 98 % 08/02/23 1451   Vitals shown include unvalidated device data.      Event Time   Out of Recovery 15:00:00         Pain/Jose Score: Pain Rating Prior to Med Admin: 7 (8/2/2023  1:55 PM)  Jose Score: 9 (8/2/2023  1:39 PM)

## 2023-08-03 NOTE — CARE UPDATE
Called patient this AM who reports good pain control and no issues with NIMBUS pump.  Will continue to follow up.

## 2023-08-04 NOTE — OPERATIVE NOTE ADDENDUM
Certification of Assistant at Surgery       Surgery Date: 8/2/2023     Participating Surgeons:  Surgeon(s) and Role:     * Nikhil Hunter MD - Primary    Procedures:  Procedure(s) (LRB):  ARTHROSCOPY, KNEE, WITH CHONDROPLASTY (Left)  EXCISION, PLICA, KNEE, ARTHROSCOPIC (Left)  REMOVAL, HARDWARE, LOWER EXTREMITY (Left)  INSERTION,ALLOGRAFT,OSTEOCHONDRAL (Left)    Assistant Surgeon's Certification of Necessity:  I understand that section 1842 (b) (6) (d) of the Social Security Act generally prohibits Medicare Part B reasonable charge payment for the services of assistants at surgery in teaching hospitals when qualified residents are available to furnish such services. I certify that the services for which payment is claimed were medically necessary, and that no qualified resident was available to perform the services. I further understand that these services are subject to post-payment review by the Medicare carrier.      Garo Hwang PA-C    08/04/2023  8:02 AM

## 2023-08-14 ENCOUNTER — TELEPHONE (OUTPATIENT)
Dept: SPORTS MEDICINE | Facility: CLINIC | Age: 23
End: 2023-08-14

## 2023-08-14 ENCOUNTER — OFFICE VISIT (OUTPATIENT)
Dept: SPORTS MEDICINE | Facility: CLINIC | Age: 23
End: 2023-08-14
Payer: MEDICAID

## 2023-08-14 VITALS — BODY MASS INDEX: 35.16 KG/M2 | WEIGHT: 211 LBS | HEIGHT: 65 IN

## 2023-08-14 DIAGNOSIS — M22.42 CHONDROMALACIA PATELLAE OF LEFT KNEE: ICD-10-CM

## 2023-08-14 DIAGNOSIS — Z98.890 S/P LEFT KNEE SURGERY: Primary | ICD-10-CM

## 2023-08-14 PROCEDURE — 99213 OFFICE O/P EST LOW 20 MIN: CPT | Mod: PBBFAC,PN | Performed by: ORTHOPAEDIC SURGERY

## 2023-08-14 PROCEDURE — 1160F RVW MEDS BY RX/DR IN RCRD: CPT | Mod: CPTII,,, | Performed by: ORTHOPAEDIC SURGERY

## 2023-08-14 PROCEDURE — 99999 PR PBB SHADOW E&M-EST. PATIENT-LVL III: CPT | Mod: PBBFAC,,, | Performed by: ORTHOPAEDIC SURGERY

## 2023-08-14 PROCEDURE — 3008F PR BODY MASS INDEX (BMI) DOCUMENTED: ICD-10-PCS | Mod: CPTII,,, | Performed by: ORTHOPAEDIC SURGERY

## 2023-08-14 PROCEDURE — 3008F BODY MASS INDEX DOCD: CPT | Mod: CPTII,,, | Performed by: ORTHOPAEDIC SURGERY

## 2023-08-14 PROCEDURE — 99999 PR PBB SHADOW E&M-EST. PATIENT-LVL III: ICD-10-PCS | Mod: PBBFAC,,, | Performed by: ORTHOPAEDIC SURGERY

## 2023-08-14 PROCEDURE — 99024 POSTOP FOLLOW-UP VISIT: CPT | Mod: ,,, | Performed by: ORTHOPAEDIC SURGERY

## 2023-08-14 PROCEDURE — 1160F PR REVIEW ALL MEDS BY PRESCRIBER/CLIN PHARMACIST DOCUMENTED: ICD-10-PCS | Mod: CPTII,,, | Performed by: ORTHOPAEDIC SURGERY

## 2023-08-14 PROCEDURE — 4010F ACE/ARB THERAPY RXD/TAKEN: CPT | Mod: CPTII,,, | Performed by: ORTHOPAEDIC SURGERY

## 2023-08-14 PROCEDURE — 99024 PR POST-OP FOLLOW-UP VISIT: ICD-10-PCS | Mod: ,,, | Performed by: ORTHOPAEDIC SURGERY

## 2023-08-14 PROCEDURE — 1159F PR MEDICATION LIST DOCUMENTED IN MEDICAL RECORD: ICD-10-PCS | Mod: CPTII,,, | Performed by: ORTHOPAEDIC SURGERY

## 2023-08-14 PROCEDURE — 4010F PR ACE/ARB THEARPY RXD/TAKEN: ICD-10-PCS | Mod: CPTII,,, | Performed by: ORTHOPAEDIC SURGERY

## 2023-08-14 PROCEDURE — 1159F MED LIST DOCD IN RCRD: CPT | Mod: CPTII,,, | Performed by: ORTHOPAEDIC SURGERY

## 2023-08-14 RX ORDER — TRAMADOL HYDROCHLORIDE 50 MG/1
50 TABLET ORAL EVERY 6 HOURS
Qty: 21 TABLET | Refills: 0 | Status: SHIPPED | OUTPATIENT
Start: 2023-08-14 | End: 2023-12-20

## 2023-08-14 RX ORDER — CELECOXIB 200 MG/1
200 CAPSULE ORAL 2 TIMES DAILY WITH MEALS
Qty: 28 CAPSULE | Refills: 0 | Status: SHIPPED | OUTPATIENT
Start: 2023-08-14 | End: 2023-08-28

## 2023-08-14 NOTE — TELEPHONE ENCOUNTER
----- Message from Ciro Elliott sent at 8/14/2023 11:37 AM CDT -----  Contact: [facility]  .Type:  Pharmacy Calling to Clarify an RX    Name of Caller:[facility] - [place], [place] - [address]   Phone: [phone]  Fax:  [phone]  Prescription Name:traMADoL (ULTRAM) 50 mg tablet  What do they need to clarify?: Needs to verified the script outside of there area.  Best Call Back Number:  Additional Information:

## 2023-08-14 NOTE — PROGRESS NOTES
Subjective:     Chief Complaint: Sheila Urbina is a 23 y.o. female who had concerns including Post-op Evaluation of the Left Knee.    HPI    Patient is here s/p Left knee hardware removal and cartilage restoration for 2 week post-op appointment. She reports 4/10 pain and is taking Tylenol and ibuprofen for pain.  She states she already took all of her Celebrex, she was taking it 3x day.  She denies any nausea, vomiting, fever, chills, shortness of breath, or calf pain. She is attending formal physical therapy at [facility]. T-scope knee brace in place.     PROCEDURE PERFORMED by Nikhil Hunter MD on 08/02/2023:   Left patella cartilage allograft (Cartimax)  Left knee arthroscopic plica excision  Left distal femur hardware removal    Review of Systems   Constitutional: Negative.   HENT: Negative.     Eyes: Negative.    Cardiovascular: Negative.    Respiratory: Negative.     Endocrine: Negative.    Hematologic/Lymphatic: Negative.    Skin: Negative.    Musculoskeletal:  Positive for joint pain, joint swelling, muscle weakness and stiffness. Negative for falls.   Neurological: Negative.    Psychiatric/Behavioral: Negative.     Allergic/Immunologic: Negative.                  Objective:     General: Sheila is well-developed, well-nourished, appears stated age, in no acute distress, alert and oriented to time, place and person.     General    Constitutional: She is oriented to person, place, and time. She appears well-developed and well-nourished. No distress.   Cardiovascular:  Intact distal pulses.            Neurological: She is alert and oriented to person, place, and time.   Psychiatric: She has a normal mood and affect. Her behavior is normal. Thought content normal.             Left Knee Exam     Inspection   Erythema: absent  Scars: present  Swelling: present  Effusion: absent  Deformity: absent  Bruising: absent    Range of Motion   Extension:  0   Flexion:  40     Other   Sensation:  normal    Comments:  Incision sites healing well, without signs of erythema, infection, or wound dehiscence.    Vascular Exam       Left Pulses  Dorsalis Pedis:      2+  Posterior Tibial:      2+        Edema  Left Lower Leg: absent          Assessment:     Encounter Diagnoses   Name Primary?    Chondromalacia patellae of left knee     S/P left knee surgery Yes        Plan:     1. Patient instructed to continue to utilize hinged T-scope knee brace until we see him again for 6 week post-op appt. Must be locked in extension during ambulation, but can bend the knee from 0-60 degrees at other times. May progress to 25-50% partial weight bearing over the next 2 weeks. Continue to advance ROM and weight-bearing according to our protocol .    2. Suture tags removed Steri-Strips applied. Patient may now shower without covering incision site. Instructed not to submerge incision site in water for another 2 weeks    3. Continue daily ASA and Celebrex.  Patient given refill for Celebrex to take for 2 more weeks.    4. Continue [facility] PT per patellofemoral cartilage restoration protocol.     5. RTC to see Nikhil Hunter MD in 4 weeks for 6 week post-op evaluation      All of the patient's questions were answered. Patient was advised to call the clinic or contact me through the patient portal for any questions or concerns.         Patient questionnaires may have been collected.

## 2023-08-17 DIAGNOSIS — M22.42 CHONDROMALACIA PATELLAE OF LEFT KNEE: ICD-10-CM

## 2023-08-17 DIAGNOSIS — M25.362 PATELLAR INSTABILITY OF LEFT KNEE: ICD-10-CM

## 2023-08-21 RX ORDER — METHOCARBAMOL 500 MG/1
500 TABLET, FILM COATED ORAL 3 TIMES DAILY PRN
Qty: 30 TABLET | Refills: 0 | Status: SHIPPED | OUTPATIENT
Start: 2023-08-21

## 2023-09-06 PROBLEM — E61.1 IRON DEFICIENCY: Status: ACTIVE | Noted: 2023-09-06

## 2023-09-06 PROBLEM — D72.819 LEUKOPENIA: Status: ACTIVE | Noted: 2023-09-06

## 2023-09-11 ENCOUNTER — OFFICE VISIT (OUTPATIENT)
Dept: SPORTS MEDICINE | Facility: CLINIC | Age: 23
End: 2023-09-11
Payer: MEDICAID

## 2023-09-11 ENCOUNTER — PATIENT MESSAGE (OUTPATIENT)
Dept: SPORTS MEDICINE | Facility: CLINIC | Age: 23
End: 2023-09-11

## 2023-09-11 VITALS — BODY MASS INDEX: 33.83 KG/M2 | HEIGHT: 65 IN | WEIGHT: 203.06 LBS

## 2023-09-11 DIAGNOSIS — M22.42 CHONDROMALACIA PATELLAE OF LEFT KNEE: ICD-10-CM

## 2023-09-11 DIAGNOSIS — M25.662 JOINT STIFFNESS OF KNEE, LEFT: Primary | ICD-10-CM

## 2023-09-11 DIAGNOSIS — Z98.890 S/P LEFT KNEE SURGERY: ICD-10-CM

## 2023-09-11 DIAGNOSIS — M25.362 PATELLAR INSTABILITY OF LEFT KNEE: ICD-10-CM

## 2023-09-11 PROCEDURE — 99024 PR POST-OP FOLLOW-UP VISIT: ICD-10-PCS | Mod: ,,, | Performed by: STUDENT IN AN ORGANIZED HEALTH CARE EDUCATION/TRAINING PROGRAM

## 2023-09-11 PROCEDURE — 1160F PR REVIEW ALL MEDS BY PRESCRIBER/CLIN PHARMACIST DOCUMENTED: ICD-10-PCS | Mod: CPTII,,, | Performed by: STUDENT IN AN ORGANIZED HEALTH CARE EDUCATION/TRAINING PROGRAM

## 2023-09-11 PROCEDURE — 4010F PR ACE/ARB THEARPY RXD/TAKEN: ICD-10-PCS | Mod: CPTII,,, | Performed by: STUDENT IN AN ORGANIZED HEALTH CARE EDUCATION/TRAINING PROGRAM

## 2023-09-11 PROCEDURE — 99024 POSTOP FOLLOW-UP VISIT: CPT | Mod: ,,, | Performed by: STUDENT IN AN ORGANIZED HEALTH CARE EDUCATION/TRAINING PROGRAM

## 2023-09-11 PROCEDURE — 1160F RVW MEDS BY RX/DR IN RCRD: CPT | Mod: CPTII,,, | Performed by: STUDENT IN AN ORGANIZED HEALTH CARE EDUCATION/TRAINING PROGRAM

## 2023-09-11 PROCEDURE — 1159F MED LIST DOCD IN RCRD: CPT | Mod: CPTII,,, | Performed by: STUDENT IN AN ORGANIZED HEALTH CARE EDUCATION/TRAINING PROGRAM

## 2023-09-11 PROCEDURE — 1159F PR MEDICATION LIST DOCUMENTED IN MEDICAL RECORD: ICD-10-PCS | Mod: CPTII,,, | Performed by: STUDENT IN AN ORGANIZED HEALTH CARE EDUCATION/TRAINING PROGRAM

## 2023-09-11 PROCEDURE — 3008F BODY MASS INDEX DOCD: CPT | Mod: CPTII,,, | Performed by: STUDENT IN AN ORGANIZED HEALTH CARE EDUCATION/TRAINING PROGRAM

## 2023-09-11 PROCEDURE — 99999 PR PBB SHADOW E&M-EST. PATIENT-LVL III: ICD-10-PCS | Mod: PBBFAC,,, | Performed by: STUDENT IN AN ORGANIZED HEALTH CARE EDUCATION/TRAINING PROGRAM

## 2023-09-11 PROCEDURE — 4010F ACE/ARB THERAPY RXD/TAKEN: CPT | Mod: CPTII,,, | Performed by: STUDENT IN AN ORGANIZED HEALTH CARE EDUCATION/TRAINING PROGRAM

## 2023-09-11 PROCEDURE — 99999 PR PBB SHADOW E&M-EST. PATIENT-LVL III: CPT | Mod: PBBFAC,,, | Performed by: STUDENT IN AN ORGANIZED HEALTH CARE EDUCATION/TRAINING PROGRAM

## 2023-09-11 PROCEDURE — 3008F PR BODY MASS INDEX (BMI) DOCUMENTED: ICD-10-PCS | Mod: CPTII,,, | Performed by: STUDENT IN AN ORGANIZED HEALTH CARE EDUCATION/TRAINING PROGRAM

## 2023-09-11 PROCEDURE — 99213 OFFICE O/P EST LOW 20 MIN: CPT | Mod: PBBFAC,PN | Performed by: STUDENT IN AN ORGANIZED HEALTH CARE EDUCATION/TRAINING PROGRAM

## 2023-09-11 NOTE — PROGRESS NOTES
Subjective:     Chief Complaint: Sheila Urbina is a 23 y.o. female who had concerns including Post-op Evaluation of the Left Knee.    HPI    Sheila Urbina is a 23 y.o. female returns today for follow-up for her left knee.  She is status post below.  Overall she is doing well.  Her pain from preop his basically gone.  She does have some stiffness in her knee.  She is been in physical therapy and making good progress.    PROCEDURE PERFORMED by Nikhil Hunter MD on 08/02/2023:   Left patella cartilage allograft (Cartimax)  Left knee arthroscopic plica excision  Left distal femur hardware removal    Review of Systems   Constitutional: Negative.   HENT: Negative.     Eyes: Negative.    Cardiovascular: Negative.    Respiratory: Negative.     Endocrine: Negative.    Hematologic/Lymphatic: Negative.    Skin: Negative.    Musculoskeletal:  Positive for muscle weakness and stiffness. Negative for falls, joint pain and joint swelling.   Neurological: Negative.    Psychiatric/Behavioral: Negative.     Allergic/Immunologic: Negative.                  Objective:     General: Sheila is well-developed, well-nourished, appears stated age, in no acute distress, alert and oriented to time, place and person.     General    Nursing note and vitals reviewed.  Constitutional: She is oriented to person, place, and time. She appears well-developed and well-nourished. No distress.   HENT:   Head: Normocephalic and atraumatic.   Nose: Nose normal.   Eyes: EOM are normal.   Cardiovascular:  Intact distal pulses.            Pulmonary/Chest: Effort normal. No respiratory distress.   Neurological: She is alert and oriented to person, place, and time.   Psychiatric: She has a normal mood and affect. Her behavior is normal. Judgment and thought content normal.     General Musculoskeletal Exam   Gait: abnormal and antalgic         Left Knee Exam     Inspection   Erythema: absent  Scars: present  Swelling: present  Effusion:  absent  Deformity: absent  Bruising: absent    Range of Motion   Extension:  0   Flexion:  70     Tests   Stability   Lachman: normal (-1 to 2mm)   PCL-Posterior Drawer: normal (0 to 2mm)  MCL - Valgus: normal (0 to 2mm)  LCL - Varus: normal (0 to 2mm)  Patella   Patellar apprehension: negative  Passive Patellar Tilt: neutral  Patellar Tracking: normal  Patellar Glide (Quadrants): Lateral - 2 Medial - 1    Other   Sensation: normal    Comments:  Incision sites healing well, without signs of erythema, infection, or wound dehiscence.    Muscle Strength   Left Lower Extremity   Quadriceps:  4/5   Hamstrin/5     Vascular Exam       Left Pulses  Dorsalis Pedis:      2+  Posterior Tibial:      2+        Edema  Left Lower Leg: absent          Assessment:   Sheila Haydee Urbina is a 23 y.o. female status post above and doing well  Encounter Diagnoses   Name Primary?    Chondromalacia patellae of left knee Yes    S/P left knee surgery     Patellar instability of left knee     Joint stiffness of knee, left         Plan:     Overall she is doing very well.  We will continue physical therapy and advance as tolerated.  Additionally, we will set her up with a DynaSplint.  Her knee is becoming quite stiff and I do not want her to lose the flexion.  Return to clinic in 4-5 weeks for repeat evaluation with x-rays.      All of the patient's questions were answered. Patient was advised to call the clinic or contact me through the patient portal for any questions or concerns.         Patient questionnaires may have been collected.

## 2023-09-20 ENCOUNTER — TELEPHONE (OUTPATIENT)
Dept: SPORTS MEDICINE | Facility: CLINIC | Age: 23
End: 2023-09-20
Payer: MEDICAID

## 2023-09-20 NOTE — TELEPHONE ENCOUNTER
WILDERM for pt in regards her appt request for wrist pain. I let her know that Dr. Hunter does not treat hand and wrist pain.

## 2023-09-20 NOTE — TELEPHONE ENCOUNTER
----- Message from Ivon Duff sent at 9/20/2023  2:12 PM CDT -----  Type:  Sooner Appointment Request    Caller is requesting a sooner appointment.  Caller declined first available appointment listed below.  Caller will not accept being placed on the waitlist and is requesting a message be sent to doctor.  Name of Caller:pt  When is the first available appointment?books were closed   Symptoms:right wrist pain   Would the patient rather a call back or a response via [facility]? Call   Best Call Back Number:[phone]  Additional Information:

## 2023-10-18 ENCOUNTER — TELEPHONE (OUTPATIENT)
Dept: SPORTS MEDICINE | Facility: CLINIC | Age: 23
End: 2023-10-18
Payer: MEDICAID

## 2023-10-18 DIAGNOSIS — Z98.890 S/P LEFT KNEE SURGERY: Primary | ICD-10-CM

## 2023-10-23 ENCOUNTER — OFFICE VISIT (OUTPATIENT)
Dept: SPORTS MEDICINE | Facility: CLINIC | Age: 23
End: 2023-10-23
Payer: MEDICAID

## 2023-10-23 DIAGNOSIS — M22.42 CHONDROMALACIA PATELLAE OF LEFT KNEE: ICD-10-CM

## 2023-10-23 DIAGNOSIS — M25.362 PATELLAR INSTABILITY OF LEFT KNEE: ICD-10-CM

## 2023-10-23 DIAGNOSIS — Z98.890 S/P LEFT KNEE SURGERY: Primary | ICD-10-CM

## 2023-10-23 PROCEDURE — 1159F PR MEDICATION LIST DOCUMENTED IN MEDICAL RECORD: ICD-10-PCS | Mod: CPTII,,, | Performed by: STUDENT IN AN ORGANIZED HEALTH CARE EDUCATION/TRAINING PROGRAM

## 2023-10-23 PROCEDURE — 4010F PR ACE/ARB THEARPY RXD/TAKEN: ICD-10-PCS | Mod: CPTII,,, | Performed by: STUDENT IN AN ORGANIZED HEALTH CARE EDUCATION/TRAINING PROGRAM

## 2023-10-23 PROCEDURE — 99024 POSTOP FOLLOW-UP VISIT: CPT | Mod: ,,, | Performed by: STUDENT IN AN ORGANIZED HEALTH CARE EDUCATION/TRAINING PROGRAM

## 2023-10-23 PROCEDURE — 99024 PR POST-OP FOLLOW-UP VISIT: ICD-10-PCS | Mod: ,,, | Performed by: STUDENT IN AN ORGANIZED HEALTH CARE EDUCATION/TRAINING PROGRAM

## 2023-10-23 PROCEDURE — 99999 PR PBB SHADOW E&M-EST. PATIENT-LVL I: CPT | Mod: PBBFAC,,, | Performed by: STUDENT IN AN ORGANIZED HEALTH CARE EDUCATION/TRAINING PROGRAM

## 2023-10-23 PROCEDURE — 1160F RVW MEDS BY RX/DR IN RCRD: CPT | Mod: CPTII,,, | Performed by: STUDENT IN AN ORGANIZED HEALTH CARE EDUCATION/TRAINING PROGRAM

## 2023-10-23 PROCEDURE — 1160F PR REVIEW ALL MEDS BY PRESCRIBER/CLIN PHARMACIST DOCUMENTED: ICD-10-PCS | Mod: CPTII,,, | Performed by: STUDENT IN AN ORGANIZED HEALTH CARE EDUCATION/TRAINING PROGRAM

## 2023-10-23 PROCEDURE — 99999 PR PBB SHADOW E&M-EST. PATIENT-LVL I: ICD-10-PCS | Mod: PBBFAC,,, | Performed by: STUDENT IN AN ORGANIZED HEALTH CARE EDUCATION/TRAINING PROGRAM

## 2023-10-23 PROCEDURE — 1159F MED LIST DOCD IN RCRD: CPT | Mod: CPTII,,, | Performed by: STUDENT IN AN ORGANIZED HEALTH CARE EDUCATION/TRAINING PROGRAM

## 2023-10-23 PROCEDURE — 4010F ACE/ARB THERAPY RXD/TAKEN: CPT | Mod: CPTII,,, | Performed by: STUDENT IN AN ORGANIZED HEALTH CARE EDUCATION/TRAINING PROGRAM

## 2023-10-23 PROCEDURE — 99211 OFF/OP EST MAY X REQ PHY/QHP: CPT | Mod: PBBFAC,PN | Performed by: STUDENT IN AN ORGANIZED HEALTH CARE EDUCATION/TRAINING PROGRAM

## 2023-10-23 NOTE — PROGRESS NOTES
Subjective:     Chief Complaint: Sheila Urbina is a 23 y.o. female who had no chief complaint listed for this encounter.    HPI    Sheila Urbina is a 23 y.o. female returns today for follow-up for her left knee.  She is status post below.  Overall she is doing much better.  Her pain is essentially gone.  She does have increased in her motion, she is improved to about 90° of flexion.  She is continued to work with physical therapy.  She is pleased with her results so far.      PROCEDURE PERFORMED by Nikhil Hunter MD on 08/02/2023:   Left patella cartilage allograft (Cartimax)  Left knee arthroscopic plica excision  Left distal femur hardware removal    Review of Systems   Constitutional: Negative.   HENT: Negative.     Eyes: Negative.    Cardiovascular: Negative.    Respiratory: Negative.     Endocrine: Negative.    Hematologic/Lymphatic: Negative.    Skin: Negative.    Musculoskeletal:  Positive for muscle weakness and stiffness. Negative for falls, joint pain and joint swelling.   Neurological: Negative.    Psychiatric/Behavioral: Negative.     Allergic/Immunologic: Negative.                  Objective:     General: Sheila is well-developed, well-nourished, appears stated age, in no acute distress, alert and oriented to time, place and person.     General    Nursing note and vitals reviewed.  Constitutional: She is oriented to person, place, and time. She appears well-developed and well-nourished. No distress.   HENT:   Head: Normocephalic and atraumatic.   Nose: Nose normal.   Eyes: EOM are normal.   Cardiovascular:  Intact distal pulses.            Pulmonary/Chest: Effort normal. No respiratory distress.   Neurological: She is alert and oriented to person, place, and time.   Psychiatric: She has a normal mood and affect. Her behavior is normal. Judgment and thought content normal.     General Musculoskeletal Exam   Gait: normal         Left Knee Exam     Inspection   Erythema: absent  Scars:  present  Swelling: absent  Effusion: absent  Deformity: absent  Bruising: absent    Range of Motion   Extension:  -5   Flexion:  90     Tests   Stability   Lachman: normal (-1 to 2mm)   PCL-Posterior Drawer: normal (0 to 2mm)  MCL - Valgus: normal (0 to 2mm)  LCL - Varus: normal (0 to 2mm)  Patella   Patellar apprehension: negative  Passive Patellar Tilt: neutral  Patellar Tracking: normal  Patellar Glide (Quadrants): Lateral - 2 Medial - 1    Other   Sensation: normal    Muscle Strength   Left Lower Extremity   Quadriceps:  4/5   Hamstrin/5     Vascular Exam       Left Pulses  Dorsalis Pedis:      2+  Posterior Tibial:      2+        Edema  Left Lower Leg: absent    Imaging:  X-ray of the left knee from 10/23/2023 personally viewed by me on that day.  These include nonweightbearing AP and lateral.  Evidence for removal hardware left distal femoral osteotomy plate.  Tibial tubercle osteotomy screws are still in place.  Her x-rays are unchanged since prior imaging.      Assessment:   Sheila Urbina is a 23 y.o. female status post above and doing well  Encounter Diagnoses   Name Primary?    S/P left knee surgery Yes    Chondromalacia patellae of left knee     Patellar instability of left knee         Plan:     She is doing well.  Continue physical therapy and advance as tolerated.  We will place a bigger emphasis on motion mainly to flexion.  Advance the settings of the DynaSplint to about 110-120 degrees.  Return to clinic in 5 weeks for repeat evaluation.    All of the patient's questions were answered. Patient was advised to call the clinic or contact me through the patient portal for any questions or concerns.         Patient questionnaires may have been collected.

## 2023-11-10 ENCOUNTER — TELEPHONE (OUTPATIENT)
Dept: SPORTS MEDICINE | Facility: CLINIC | Age: 23
End: 2023-11-10
Payer: MEDICAID

## 2023-11-10 DIAGNOSIS — Z98.890 S/P LEFT KNEE SURGERY: Primary | ICD-10-CM

## 2023-12-01 ENCOUNTER — TELEPHONE (OUTPATIENT)
Dept: SPORTS MEDICINE | Facility: CLINIC | Age: 23
End: 2023-12-01
Payer: MEDICAID

## 2023-12-01 NOTE — TELEPHONE ENCOUNTER
----- Message from Cherie Telles sent at 12/1/2023  3:23 PM CST -----  Regarding: appt  Contact: [phone]  Pt requesting f/u appt from surgery in Aug. Attempted to schedule, no access. Pls call to discuss.

## 2023-12-18 ENCOUNTER — PATIENT MESSAGE (OUTPATIENT)
Dept: ADMINISTRATIVE | Facility: HOSPITAL | Age: 23
End: 2023-12-18
Payer: MEDICAID

## 2023-12-22 PROBLEM — D69.6 THROMBOCYTOPENIA: Status: ACTIVE | Noted: 2023-12-22

## 2024-02-06 ENCOUNTER — OFFICE VISIT (OUTPATIENT)
Dept: URGENT CARE | Facility: CLINIC | Age: 24
End: 2024-02-06
Payer: MEDICAID

## 2024-02-06 VITALS
TEMPERATURE: 98 F | RESPIRATION RATE: 17 BRPM | WEIGHT: 207 LBS | BODY MASS INDEX: 34.49 KG/M2 | SYSTOLIC BLOOD PRESSURE: 116 MMHG | HEART RATE: 85 BPM | OXYGEN SATURATION: 98 % | HEIGHT: 65 IN | DIASTOLIC BLOOD PRESSURE: 89 MMHG

## 2024-02-06 DIAGNOSIS — B34.9 VIRAL SYNDROME: ICD-10-CM

## 2024-02-06 DIAGNOSIS — R52 BODY ACHES: Primary | ICD-10-CM

## 2024-02-06 LAB
CTP QC/QA: YES
CTP QC/QA: YES
POC MOLECULAR INFLUENZA A AGN: NEGATIVE
POC MOLECULAR INFLUENZA B AGN: NEGATIVE
SARS-COV-2 AG RESP QL IA.RAPID: NEGATIVE

## 2024-02-06 PROCEDURE — 87811 SARS-COV-2 COVID19 W/OPTIC: CPT | Mod: QW,S$GLB,, | Performed by: NURSE PRACTITIONER

## 2024-02-06 PROCEDURE — 99214 OFFICE O/P EST MOD 30 MIN: CPT | Mod: S$GLB,,, | Performed by: NURSE PRACTITIONER

## 2024-02-06 PROCEDURE — 87502 INFLUENZA DNA AMP PROBE: CPT | Mod: QW,S$GLB,, | Performed by: NURSE PRACTITIONER

## 2024-02-06 RX ORDER — FLUOXETINE HYDROCHLORIDE 40 MG/1
CAPSULE ORAL DAILY
COMMUNITY

## 2024-02-06 NOTE — LETTER
February 6, 2024      [facility]  [address], [address]  [place] [address]  Phone: [phone]  Fax: [phone]       Patient: Sheila Urbina   YOB: 2000  Date of Visit: 02/06/2024    To Whom It May Concern:    Jason Urbina  was at [facility] on 02/06/2024. The patient may return to work/school on 2/9/2024 with no restrictions. If you have any questions or concerns, or if I can be of further assistance, please do not hesitate to contact me.    Sincerely,    Tash Walters, NP

## 2024-02-06 NOTE — PROGRESS NOTES
"Subjective:      Patient ID: Sheila Urbina is a 23 y.o. female.    Vitals:  height is 5' 5" (1.651 m) and weight is 93.9 kg (207 lb). Her oral temperature is 97.6 °F (36.4 °C). Her blood pressure is 116/89 and her pulse is 85. Her respiration is 17 and oxygen saturation is 98%.     Chief Complaint: Generalized Body Aches    PT is coming in for body aches that began on Sunday. Pt states her whole body hurts when doing any activity.     Other  This is a new problem. The current episode started in the past 7 days. The problem occurs constantly. The problem has been unchanged. Associated symptoms include nausea. Pertinent negatives include no abdominal pain, congestion, coughing, headaches or vomiting. Nothing aggravates the symptoms. Treatments tried: tylenol and ibuprofen. The treatment provided no relief.       HENT:  Negative for congestion.    Respiratory:  Negative for cough.    Gastrointestinal:  Positive for nausea. Negative for abdominal pain and vomiting.   Neurological:  Negative for headaches.      Objective:     Physical Exam   Constitutional: She is oriented to person, place, and time. normal  HENT:   Head: Normocephalic.   Ears:   Right Ear: Tympanic membrane normal.   Left Ear: Tympanic membrane normal.   Nose: Nose normal.   Mouth/Throat: Mucous membranes are moist.   Cardiovascular: Normal rate.   Pulmonary/Chest: Effort normal.   Abdominal: Normal appearance and bowel sounds are normal.   Musculoskeletal: Normal range of motion.         General: Normal range of motion.   Neurological: no focal deficit. She is alert, oriented to person, place, and time and at baseline.   Skin: Skin is warm. Capillary refill takes less than 2 seconds.   Nursing note and vitals reviewed.    Assessment:     1. Body aches    2. Viral syndrome      Results for orders placed or performed in visit on 02/06/24   SARS Coronavirus 2 Antigen, POCT Manual Read   Result Value Ref Range    SARS Coronavirus 2 Antigen Negative " Negative     Acceptable Yes    POCT Influenza A/B MOLECULAR   Result Value Ref Range    POC Molecular Influenza A Ag Negative Negative, Not Reported    POC Molecular Influenza B Ag Negative Negative, Not Reported     Acceptable Yes        Plan:       Body aches  -     SARS Coronavirus 2 Antigen, POCT Manual Read  -     POCT Influenza A/B MOLECULAR    Viral syndrome

## 2024-02-23 ENCOUNTER — HOSPITAL ENCOUNTER (EMERGENCY)
Facility: HOSPITAL | Age: 24
Discharge: HOME | End: 2024-02-24
Attending: SURGERY
Payer: MEDICAID

## 2024-02-23 DIAGNOSIS — M25.50 MULTIPLE JOINT PAIN: ICD-10-CM

## 2024-02-23 DIAGNOSIS — R52 GENERALIZED BODY ACHES: ICD-10-CM

## 2024-02-23 DIAGNOSIS — R53.83 FATIGUE: ICD-10-CM

## 2024-02-23 DIAGNOSIS — M79.10 MYALGIA: ICD-10-CM

## 2024-02-23 DIAGNOSIS — F43.9 STRESS: ICD-10-CM

## 2024-02-23 LAB
ALBUMIN SERPL BCP-MCNC: 2.6 G/DL (ref 3.5–5.2)
ALP SERPL-CCNC: 125 U/L (ref 55–135)
ALT SERPL W/O P-5'-P-CCNC: 12 U/L (ref 10–44)
AMORPH CRY URNS QL MICRO: ABNORMAL
AMPHET+METHAMPHET UR QL: NEGATIVE
ANION GAP SERPL CALC-SCNC: 8 MMOL/L (ref 8–16)
AST SERPL-CCNC: 33 U/L (ref 10–40)
B-HCG UR QL: NEGATIVE
BACTERIA #/AREA URNS HPF: ABNORMAL /HPF
BARBITURATES UR QL SCN>200 NG/ML: NEGATIVE
BASOPHILS # BLD AUTO: 0.01 K/UL (ref 0–0.2)
BASOPHILS NFR BLD: 0.3 % (ref 0–1.9)
BENZODIAZ UR QL SCN>200 NG/ML: NEGATIVE
BILIRUB SERPL-MCNC: 0.6 MG/DL (ref 0.1–1)
BILIRUB UR QL STRIP: NEGATIVE
BNP SERPL-MCNC: 47 PG/ML (ref 0–99)
BUN SERPL-MCNC: 23 MG/DL (ref 6–20)
BZE UR QL SCN: NEGATIVE
CALCIUM SERPL-MCNC: 8.9 MG/DL (ref 8.7–10.5)
CANNABINOIDS UR QL SCN: NEGATIVE
CHLORIDE SERPL-SCNC: 109 MMOL/L (ref 95–110)
CK SERPL-CCNC: 31 U/L (ref 20–180)
CLARITY UR: CLEAR
CO2 SERPL-SCNC: 18 MMOL/L (ref 23–29)
COLOR UR: YELLOW
CREAT SERPL-MCNC: 0.9 MG/DL (ref 0.5–1.4)
CREAT UR-MCNC: 117.3 MG/DL (ref 15–325)
DIFFERENTIAL METHOD BLD: ABNORMAL
EOSINOPHIL # BLD AUTO: 0.1 K/UL (ref 0–0.5)
EOSINOPHIL NFR BLD: 3.7 % (ref 0–8)
ERYTHROCYTE [DISTWIDTH] IN BLOOD BY AUTOMATED COUNT: 11.9 % (ref 11.5–14.5)
ERYTHROCYTE [SEDIMENTATION RATE] IN BLOOD BY WESTERGREN METHOD: 43 MM/HR (ref 0–20)
EST. GFR  (NO RACE VARIABLE): >60 ML/MIN/1.73 M^2
GLUCOSE SERPL-MCNC: 92 MG/DL (ref 70–110)
GLUCOSE UR QL STRIP: NEGATIVE
GROUP A STREP, MOLECULAR: NEGATIVE
HCT VFR BLD AUTO: 30.2 % (ref 37–48.5)
HETEROPH AB SERPL QL IA: NEGATIVE
HGB BLD-MCNC: 10.3 G/DL (ref 12–16)
HGB UR QL STRIP: NEGATIVE
HIV1+2 IGG SERPL QL IA.RAPID: NORMAL
HYALINE CASTS #/AREA URNS LPF: 4 /LPF
IMM GRANULOCYTES # BLD AUTO: 0.04 K/UL (ref 0–0.04)
IMM GRANULOCYTES NFR BLD AUTO: 1.1 % (ref 0–0.5)
INFLUENZA A, MOLECULAR: NEGATIVE
INFLUENZA B, MOLECULAR: NEGATIVE
KETONES UR QL STRIP: NEGATIVE
LEUKOCYTE ESTERASE UR QL STRIP: ABNORMAL
LYMPHOCYTES # BLD AUTO: 0.5 K/UL (ref 1–4.8)
LYMPHOCYTES NFR BLD: 13.6 % (ref 18–48)
MAGNESIUM SERPL-MCNC: 1.7 MG/DL (ref 1.6–2.6)
MCH RBC QN AUTO: 28.6 PG (ref 27–31)
MCHC RBC AUTO-ENTMCNC: 34.1 G/DL (ref 32–36)
MCV RBC AUTO: 84 FL (ref 82–98)
METHADONE UR QL SCN>300 NG/ML: NEGATIVE
MICROSCOPIC COMMENT: ABNORMAL
MONOCYTES # BLD AUTO: 0.1 K/UL (ref 0.3–1)
MONOCYTES NFR BLD: 1.7 % (ref 4–15)
NEUTROPHILS # BLD AUTO: 2.8 K/UL (ref 1.8–7.7)
NEUTROPHILS NFR BLD: 79.6 % (ref 38–73)
NITRITE UR QL STRIP: NEGATIVE
NRBC BLD-RTO: 0 /100 WBC
OPIATES UR QL SCN: NEGATIVE
PCP UR QL SCN>25 NG/ML: NEGATIVE
PH UR STRIP: 6 [PH] (ref 5–8)
PHOSPHATE SERPL-MCNC: 3.3 MG/DL (ref 2.7–4.5)
PLATELET # BLD AUTO: 127 K/UL (ref 150–450)
PMV BLD AUTO: 10.2 FL (ref 9.2–12.9)
POTASSIUM SERPL-SCNC: 4.6 MMOL/L (ref 3.5–5.1)
PROT SERPL-MCNC: 6.3 G/DL (ref 6–8.4)
PROT UR QL STRIP: NEGATIVE
RBC # BLD AUTO: 3.6 M/UL (ref 4–5.4)
SARS-COV-2 RDRP RESP QL NAA+PROBE: NEGATIVE
SODIUM SERPL-SCNC: 135 MMOL/L (ref 136–145)
SP GR UR STRIP: 1.01 (ref 1–1.03)
SPECIMEN SOURCE: NORMAL
SQUAMOUS #/AREA URNS HPF: 3 /HPF
T4 FREE SERPL-MCNC: 1.07 NG/DL (ref 0.71–1.51)
TOXICOLOGY INFORMATION: NORMAL
TROPONIN I SERPL DL<=0.01 NG/ML-MCNC: <0.006 NG/ML (ref 0–0.03)
TSH SERPL DL<=0.005 MIU/L-ACNC: 6.09 UIU/ML (ref 0.4–4)
URN SPEC COLLECT METH UR: ABNORMAL
UROBILINOGEN UR STRIP-ACNC: 1 EU/DL
WBC # BLD AUTO: 3.53 K/UL (ref 3.9–12.7)
WBC #/AREA URNS HPF: 19 /HPF (ref 0–5)

## 2024-02-23 PROCEDURE — 86618 LYME DISEASE ANTIBODY: CPT | Performed by: SURGERY

## 2024-02-23 PROCEDURE — 80074 ACUTE HEPATITIS PANEL: CPT | Performed by: SURGERY

## 2024-02-23 PROCEDURE — 87502 INFLUENZA DNA AMP PROBE: CPT | Performed by: SURGERY

## 2024-02-23 PROCEDURE — 99285 EMERGENCY DEPT VISIT HI MDM: CPT | Mod: 25

## 2024-02-23 PROCEDURE — 86308 HETEROPHILE ANTIBODY SCREEN: CPT | Performed by: SURGERY

## 2024-02-23 PROCEDURE — 87077 CULTURE AEROBIC IDENTIFY: CPT | Performed by: SURGERY

## 2024-02-23 PROCEDURE — 87651 STREP A DNA AMP PROBE: CPT | Performed by: SURGERY

## 2024-02-23 PROCEDURE — 93010 ELECTROCARDIOGRAM REPORT: CPT | Mod: ,,, | Performed by: INTERNAL MEDICINE

## 2024-02-23 PROCEDURE — 80307 DRUG TEST PRSMV CHEM ANLYZR: CPT | Performed by: SURGERY

## 2024-02-23 PROCEDURE — 86225 DNA ANTIBODY NATIVE: CPT | Mod: 59 | Performed by: SURGERY

## 2024-02-23 PROCEDURE — 84484 ASSAY OF TROPONIN QUANT: CPT | Performed by: SURGERY

## 2024-02-23 PROCEDURE — 86235 NUCLEAR ANTIGEN ANTIBODY: CPT | Mod: 59 | Performed by: SURGERY

## 2024-02-23 PROCEDURE — 85651 RBC SED RATE NONAUTOMATED: CPT | Performed by: SURGERY

## 2024-02-23 PROCEDURE — 86592 SYPHILIS TEST NON-TREP QUAL: CPT | Performed by: SURGERY

## 2024-02-23 PROCEDURE — U0002 COVID-19 LAB TEST NON-CDC: HCPCS | Performed by: SURGERY

## 2024-02-23 PROCEDURE — 86431 RHEUMATOID FACTOR QUANT: CPT | Performed by: SURGERY

## 2024-02-23 PROCEDURE — 84100 ASSAY OF PHOSPHORUS: CPT | Performed by: SURGERY

## 2024-02-23 PROCEDURE — 81025 URINE PREGNANCY TEST: CPT | Performed by: SURGERY

## 2024-02-23 PROCEDURE — 86039 ANTINUCLEAR ANTIBODIES (ANA): CPT | Performed by: SURGERY

## 2024-02-23 PROCEDURE — 83735 ASSAY OF MAGNESIUM: CPT | Performed by: SURGERY

## 2024-02-23 PROCEDURE — 83880 ASSAY OF NATRIURETIC PEPTIDE: CPT | Performed by: SURGERY

## 2024-02-23 PROCEDURE — 82550 ASSAY OF CK (CPK): CPT | Performed by: SURGERY

## 2024-02-23 PROCEDURE — 87186 SC STD MICRODIL/AGAR DIL: CPT | Performed by: SURGERY

## 2024-02-23 PROCEDURE — 93005 ELECTROCARDIOGRAM TRACING: CPT

## 2024-02-23 PROCEDURE — 87088 URINE BACTERIA CULTURE: CPT | Performed by: SURGERY

## 2024-02-23 PROCEDURE — 87086 URINE CULTURE/COLONY COUNT: CPT | Performed by: SURGERY

## 2024-02-23 PROCEDURE — 80053 COMPREHEN METABOLIC PANEL: CPT | Performed by: SURGERY

## 2024-02-23 PROCEDURE — 81000 URINALYSIS NONAUTO W/SCOPE: CPT | Mod: 59 | Performed by: SURGERY

## 2024-02-23 PROCEDURE — 86038 ANTINUCLEAR ANTIBODIES: CPT | Performed by: SURGERY

## 2024-02-23 PROCEDURE — 86703 HIV-1/HIV-2 1 RESULT ANTBDY: CPT | Performed by: SURGERY

## 2024-02-23 PROCEDURE — 99900035 HC TECH TIME PER 15 MIN (STAT)

## 2024-02-23 PROCEDURE — 84443 ASSAY THYROID STIM HORMONE: CPT | Performed by: SURGERY

## 2024-02-23 PROCEDURE — 84439 ASSAY OF FREE THYROXINE: CPT | Performed by: SURGERY

## 2024-02-23 PROCEDURE — 85025 COMPLETE CBC W/AUTO DIFF WBC: CPT | Performed by: SURGERY

## 2024-02-23 NOTE — Clinical Note
"Sheila Lauren" Ciara was seen and treated in our emergency department on 2/23/2024.  She may return to school on 03/02/2024.      If you have any questions or concerns, please don't hesitate to call.      Martin Marmolejo RN"

## 2024-02-24 VITALS
BODY MASS INDEX: 35.77 KG/M2 | SYSTOLIC BLOOD PRESSURE: 144 MMHG | RESPIRATION RATE: 18 BRPM | TEMPERATURE: 99 F | OXYGEN SATURATION: 100 % | HEART RATE: 96 BPM | WEIGHT: 214.94 LBS | DIASTOLIC BLOOD PRESSURE: 96 MMHG

## 2024-02-24 LAB
HAV IGM SERPL QL IA: NORMAL
HBV CORE IGM SERPL QL IA: NORMAL
HBV SURFACE AG SERPL QL IA: NORMAL
HCV AB SERPL QL IA: NORMAL
RHEUMATOID FACT SERPL-ACNC: <13 IU/ML (ref 0–15)

## 2024-02-24 PROCEDURE — 96372 THER/PROPH/DIAG INJ SC/IM: CPT | Performed by: SURGERY

## 2024-02-24 PROCEDURE — 63600175 PHARM REV CODE 636 W HCPCS: Performed by: SURGERY

## 2024-02-24 RX ORDER — KETOROLAC TROMETHAMINE 30 MG/ML
60 INJECTION, SOLUTION INTRAMUSCULAR; INTRAVENOUS
Status: COMPLETED | OUTPATIENT
Start: 2024-02-24 | End: 2024-02-24

## 2024-02-24 RX ORDER — DICLOFENAC SODIUM 50 MG/1
50 TABLET, DELAYED RELEASE ORAL 2 TIMES DAILY
Qty: 30 TABLET | Refills: 1 | Status: SHIPPED | OUTPATIENT
Start: 2024-02-24 | End: 2024-02-24 | Stop reason: ALTCHOICE

## 2024-02-24 RX ORDER — GABAPENTIN 100 MG/1
100 CAPSULE ORAL 3 TIMES DAILY
Qty: 90 CAPSULE | Refills: 11 | Status: SHIPPED | OUTPATIENT
Start: 2024-02-24 | End: 2025-02-23

## 2024-02-24 RX ADMIN — KETOROLAC TROMETHAMINE 60 MG: 30 INJECTION, SOLUTION INTRAMUSCULAR at 12:02

## 2024-02-24 NOTE — ED NOTES
Discussed the importance of returning with any new, worsening, or unrelenting symptoms. Counseled on importance of following up with PCP and taking medications as directed. Patient understands and agrees with plan. All questions and concerns addressed. Rx's sent to pharmacy. School excuse given. No questions or concerns voiced.

## 2024-02-24 NOTE — ED PROVIDER NOTES
Encounter Date: 2/23/2024       History     Chief Complaint   Patient presents with    Sore Throat     PT TO ER WITH C/O BODY ACHES SINCE 2/2/24. PT NOW C/O SORE THROAT AND CP. PT HAS SEEN MULTIPLE DOCTORS WITH NO ANSWERS. PT REQUESTING TO BE ADMITTED.      History of Present Illness  Sheila Urbina is a 23 y.o. female that presents with myalgias for several weeks  Patient has had body aches for last several weeks, sore throat this evening as well  She has been to several physicians including PCP, urgent care, EGDs with complaints  She has got no diagnosis has a rheumatology appointment in March with Rosemarie  Patient continues to have anxiety, stress, body aches & several bizarre symptoms  Normal sinus rhythm on EKG with no ST changes on ER evaluation this late evening    The history is provided by the patient.     Review of patient's allergies indicates:   Allergen Reactions    Oxycontin [oxycodone]      Nightmares     Past Medical History:   Diagnosis Date    Aggressive behavior     post anesthesia    Essential hypertension 4/12/2021    JAN (generalized anxiety disorder) 9/18/2020    Granuloma of skin     left lower eye lid at age 4    Hypertension     Mild intermittent asthma without complication 9/18/2020    Obesity 5/7/2018    Ovarian cyst 2013    Recurrent dislocation of left patella     Vision abnormalities      Past Surgical History:   Procedure Laterality Date    ARTHROSCOPIC CHONDROPLASTY OF KNEE JOINT Left 8/2/2023    Procedure: ARTHROSCOPY, KNEE, WITH CHONDROPLASTY;  Surgeon: Nikhil Hunter MD;  Location: [facility] OR;  Service: Orthopedics;  Laterality: Left;  with catheter    EXAMINATION UNDER ANESTHESIA N/A 10/19/2022    Procedure: RECTAL EXAM UNDER ANESTHESIA;  Surgeon: Nehemias Torres MD;  Location: [facility] OR;  Service: General;  Laterality: N/A;    EYE SURGERY      granuloma removal under left eye    INCISION AND DRAINAGE OF PERIRECTAL REGION N/A 12/1/2021    Procedure: INCISION AND DRAINAGE,  PERIRECTAL REGION;  Surgeon: Nehemias Torres MD;  [facility];  Service: General;  Laterality: N/A;    INSERTION, ALLOGRAFT, OSTEOCHONDRAL Left 8/2/2023    Procedure: INSERTION,ALLOGRAFT,OSTEOCHONDRAL;  Surgeon: Nikhil Hunter MD;  Location: [facility] OR;  Service: Orthopedics;  Laterality: Left;    KNEE ARTHROSCOPY      KNEE ARTHROSCOPY W/ PLICA EXCISION Left 8/2/2023    Procedure: EXCISION, PLICA, KNEE, ARTHROSCOPIC;  Surgeon: Nikhil Hunter MD;  Location: [facility] OR;  Service: Orthopedics;  Laterality: Left;    Osteotomy of tibia      PATELLA REALIGNMENT      REMOVAL OF HARDWARE FROM LOWER EXTREMITY Left 8/2/2023    Procedure: REMOVAL, HARDWARE, LOWER EXTREMITY;  Surgeon: Nikhil Hunter MD;  Location: [facility] OR;  Service: Orthopedics;  Laterality: Left;     Family History   Problem Relation Age of Onset    Diabetes Mother     Seizures Father      Social History     Tobacco Use    Smoking status: Never     Passive exposure: Never    Smokeless tobacco: Never   Substance Use Topics    Alcohol use: No    Drug use: No     Review of Systems   Constitutional: Negative.    HENT:  Positive for sore throat.    Eyes: Negative.    Respiratory: Negative.     Cardiovascular: Negative.    Gastrointestinal: Negative.    Genitourinary:  Negative for dysuria, urgency and vaginal discharge.   Musculoskeletal:  Positive for myalgias.   Skin: Negative.    Neurological: Negative.    Psychiatric/Behavioral: Negative.     All other systems reviewed and are negative.      Physical Exam     Initial Vitals   BP Pulse Resp Temp SpO2   02/23/24 2006 02/23/24 2006 02/24/24 0000 02/23/24 2006 02/23/24 2006   (!) 149/100 97 18 97.7 °F (36.5 °C) 100 %      MAP       --                Physical Exam    Nursing note and vitals reviewed.  Constitutional: Vital signs are normal. She appears well-developed and well-nourished. She is cooperative.   HENT:   Head: Normocephalic and atraumatic.   Eyes: Conjunctivae, EOM and lids are normal. Pupils  are equal, round, and reactive to light.   Neck: Trachea normal and phonation normal. Neck supple. No JVD present.   Normal range of motion.   Full passive range of motion without pain.     Cardiovascular:  Normal rate, regular rhythm, S1 normal, S2 normal, normal heart sounds, intact distal pulses and normal pulses.           Pulmonary/Chest: Effort normal and breath sounds normal.   Abdominal: Abdomen is soft and flat. Bowel sounds are normal.   Musculoskeletal:         General: Normal range of motion.      Cervical back: Full passive range of motion without pain, normal range of motion and neck supple.     Neurological: She is alert and oriented to person, place, and time. She has normal strength.   Skin: Skin is warm, dry and intact. Capillary refill takes less than 2 seconds.         ED Course   Procedures  Labs Reviewed   COMPREHENSIVE METABOLIC PANEL - Abnormal; Notable for the following components:       Result Value    Sodium 135 (*)     CO2 18 (*)     BUN 23 (*)     Albumin 2.6 (*)     All other components within normal limits   CBC W/ AUTO DIFFERENTIAL - Abnormal; Notable for the following components:    WBC 3.53 (*)     RBC 3.60 (*)     Hemoglobin 10.3 (*)     Hematocrit 30.2 (*)     Platelets 127 (*)     Immature Granulocytes 1.1 (*)     Lymph # 0.5 (*)     Mono # 0.1 (*)     Gran % 79.6 (*)     Lymph % 13.6 (*)     Mono % 1.7 (*)     All other components within normal limits   SEDIMENTATION RATE - Abnormal; Notable for the following components:    Sed Rate 43 (*)     All other components within normal limits   TSH - Abnormal; Notable for the following components:    TSH 6.092 (*)     All other components within normal limits   URINALYSIS, REFLEX TO URINE CULTURE - Abnormal; Notable for the following components:    Leukocytes, UA Trace (*)     All other components within normal limits    Narrative:     Specimen Source->Urine   URINALYSIS MICROSCOPIC - Abnormal; Notable for the following components:     WBC, UA 19 (*)     Bacteria Few (*)     Hyaline Casts, UA 4 (*)     All other components within normal limits    Narrative:     Specimen Source->Urine   INFLUENZA A & B BY MOLECULAR   GROUP A STREP, MOLECULAR   CULTURE, URINE   TROPONIN I   CK   B-TYPE NATRIURETIC PEPTIDE   SARS-COV-2 RNA AMPLIFICATION, QUAL   HETEROPHILE AB SCREEN   RAPID HIV   MAGNESIUM   PHOSPHORUS   PREGNANCY TEST, URINE RAPID    Narrative:     Specimen Source->Urine   DRUG SCREEN PANEL, URINE EMERGENCY    Narrative:     Specimen Source->Urine   T4, FREE   KORY PROFILE I (SCREEN) W/ REFLEX   RHEUMATOID FACTOR   RPR   HEPATITIS PANEL, ACUTE   B. BURGDORFERI ABS (LYME DISEASE)     EKG Readings: (Independently Interpreted)   No STEMI  Normal sinus rhythm  No ectopy  Normal conduction  Normal ST segments  Normal T-wave  Normal axis  Heart rate in the 90s       Imaging Results              X-Ray Chest 1 View (Final result)  Result time 02/24/24 00:02:50      Final result by Micky Mcgovern MD (02/24/24 00:02:50)                   Impression:      No radiographic evidence of acute intrathoracic process on this single view..      Electronically signed by: Micky Mcgovern MD  Date:    02/24/2024  Time:    00:02               Narrative:    EXAMINATION:  XR CHEST 1 VIEW    CLINICAL HISTORY:  fatigue;    TECHNIQUE:  Single frontal view of the chest was performed.    COMPARISON:  None    FINDINGS:  The cardiac silhouette appears within normal limits.  Lungs appear well expanded without evidence of confluent airspace consolidation.  No significant volume of pleural fluid or pneumothorax identified.  The visualized osseous structures appear intact.                                       Medications   ketorolac injection 60 mg (60 mg Intramuscular Given 2/24/24 0016)     Medical Decision Making  23-year-old female presents with myalgias in various complaints for several weeks  Has been to an urgent care, ED, primary care physician, rheumatology pending  now  Patient has had myalgias, sore throat, body aches with several (-) recent workups    Differential Diagnosis  Myalgias, fatigue, anxiety, flu, strep, COVID, urinary tract infection, pregnancy  Fibromyalgia, chronic pain syndrome, Lyme disease, rheumatoid arthritis  Rheumatologic disease, osteoarthritis, joint pain not otherwise specified    Problems Addressed:  Fatigue: complicated acute illness or injury  Myalgia: complicated acute illness or injury  Stress: complicated acute illness or injury    Amount and/or Complexity of Data Reviewed  External Data Reviewed: notes.  Labs: ordered. Decision-making details documented in ED Course.  Radiology: ordered and independent interpretation performed.  ECG/medicine tests: ordered and independent interpretation performed.    ED Management & Risks of Complication, Morbidity, & Mortality:  Normal sinus rhythm on EKG with no ST changes noted today  Clear chest x-ray on ER evaluation this early morning  Stable baseline lab work with no obvious gross abnormalities  HIV (-), flu (-), COVID (-), no definitive urinary tract infection  Pregnancy test (-); rheumatoid factor, KORY, Lyme disease pending  Patient has chronic pain & body aches & myalgias for several weeks   Patient also has significant anxiety, stress, is missing college now  I have counseled the mother that she needs to follow-up outpatient  Needs to see a rheumatologist, call Monday for closer appointment  Gabapentin prescribed to help pain going forward, declined Voltaren  Carefully counseled to follow-up with PCP & Psychiatry as well  Pt/Family counseled to return to the ER with any concerning symptoms     Need for Hospitalization or Surgery with Social Determinants of Health:  This patient does not need to be hospitalized on ER evaluation today  The patient's diagnosis is not limited by social determinants of health  Does not require surgery or procedure (major or minor), no risk factors    Clinical  Impression:  Final diagnoses:  [R53.83] Fatigue  [M79.10] Myalgia  [F43.9] Stress          ED Disposition Condition    Discharge Stable          ED Prescriptions       Medication Sig Dispense Start Date End Date Auth. Provider    diclofenac (VOLTAREN) 50 MG EC tablet Take 1 tablet (50 mg total) by mouth 2 (two) times daily. 30 tablet 2/24/2024 -- Guido Mcgee MD          Follow-up Information       Follow up With Specialties Details Why Contact Info    Lissett Yepez NP Internal Medicine, Family Medicine Go in 2 days  [address]  [place] [address]  [phone]      Daniel Sharma MD Rheumatology Go in 2 days  [address]  [address]  [place] [facility] [place] [address]  [phone]                 Guido Mcgee MD  03/02/24 [id number]

## 2024-02-25 LAB — BACTERIA UR CULT: ABNORMAL

## 2024-02-26 LAB
ANA PATTERN 1: NORMAL
ANA SER QL IF: POSITIVE
ANA TITR SER IF: >2560 {TITER}
OHS QRS DURATION: 78 MS
OHS QTC CALCULATION: 452 MS
RPR SER QL: NORMAL

## 2024-02-28 LAB
ANTI SM ANTIBODY: 0.17 RATIO (ref 0–0.99)
ANTI SM/RNP ANTIBODY: 0.14 RATIO (ref 0–0.99)
ANTI-SM INTERPRETATION: NEGATIVE
ANTI-SM/RNP INTERPRETATION: NEGATIVE
ANTI-SSA ANTIBODY: 0.12 RATIO (ref 0–0.99)
ANTI-SSA INTERPRETATION: NEGATIVE
ANTI-SSB ANTIBODY: 0.12 RATIO (ref 0–0.99)
ANTI-SSB INTERPRETATION: NEGATIVE
B BURGDOR AB SER IA-ACNC: 0.87 INDEX VALUE
DNA TITER: >5120
DSDNA AB SER-ACNC: POSITIVE [IU]/ML

## 2024-06-20 PROBLEM — M32.9 LUPUS (SYSTEMIC LUPUS ERYTHEMATOSUS): Status: ACTIVE | Noted: 2024-06-20

## 2024-10-22 ENCOUNTER — TELEPHONE (OUTPATIENT)
Dept: OPHTHALMOLOGY | Facility: CLINIC | Age: 24
End: 2024-10-22
Payer: MEDICAID

## 2024-10-24 PROBLEM — E66.812 CLASS 2 OBESITY DUE TO EXCESS CALORIES WITHOUT SERIOUS COMORBIDITY WITH BODY MASS INDEX (BMI) OF 35.0 TO 35.9 IN ADULT: Status: ACTIVE | Noted: 2024-10-24

## 2024-10-24 PROBLEM — E66.09 CLASS 2 OBESITY DUE TO EXCESS CALORIES WITHOUT SERIOUS COMORBIDITY WITH BODY MASS INDEX (BMI) OF 35.0 TO 35.9 IN ADULT: Status: ACTIVE | Noted: 2024-10-24

## 2024-10-24 PROBLEM — Z78.9 NONSMOKER: Status: ACTIVE | Noted: 2024-10-24

## 2024-10-24 PROBLEM — I10 ESSENTIAL HYPERTENSION: Status: ACTIVE | Noted: 2024-10-24

## 2024-10-24 PROBLEM — I10 PRIMARY HYPERTENSION: Status: RESOLVED | Noted: 2021-04-12 | Resolved: 2024-10-24

## 2024-10-24 PROBLEM — R07.89 ATYPICAL CHEST PAIN: Status: ACTIVE | Noted: 2024-10-24

## 2024-10-24 PROBLEM — I34.0 NONRHEUMATIC MITRAL VALVE REGURGITATION: Status: ACTIVE | Noted: 2024-10-24

## 2024-10-24 PROBLEM — R00.2 PALPITATIONS: Status: ACTIVE | Noted: 2024-10-24

## 2024-11-19 ENCOUNTER — PATIENT MESSAGE (OUTPATIENT)
Dept: GASTROENTEROLOGY | Facility: CLINIC | Age: 24
End: 2024-11-19
Payer: MEDICAID

## 2024-12-17 PROBLEM — G36.0 NEUROMYELITIS OPTICA: Status: ACTIVE | Noted: 2024-12-17

## 2025-01-07 ENCOUNTER — PATIENT MESSAGE (OUTPATIENT)
Dept: RESEARCH | Facility: HOSPITAL | Age: 25
End: 2025-01-07
Payer: MEDICAID

## 2025-04-04 ENCOUNTER — TELEPHONE (OUTPATIENT)
Dept: OPHTHALMOLOGY | Facility: CLINIC | Age: 25
End: 2025-04-04
Payer: MEDICAID

## 2025-04-24 PROBLEM — I51.7 LEFT ATRIAL ENLARGEMENT: Status: ACTIVE | Noted: 2025-04-24

## 2025-05-23 ENCOUNTER — OFFICE VISIT (OUTPATIENT)
Dept: OTOLARYNGOLOGY | Facility: CLINIC | Age: 25
End: 2025-05-23
Payer: MEDICAID

## 2025-05-23 VITALS
DIASTOLIC BLOOD PRESSURE: 74 MMHG | BODY MASS INDEX: 35.26 KG/M2 | HEART RATE: 75 BPM | SYSTOLIC BLOOD PRESSURE: 111 MMHG | HEIGHT: 65 IN | WEIGHT: 211.63 LBS

## 2025-05-23 DIAGNOSIS — H61.23 BILATERAL IMPACTED CERUMEN: Primary | ICD-10-CM

## 2025-05-23 NOTE — PROCEDURES
Ear Cerumen Removal    Date/Time: 5/23/2025 1:00 PM    Performed by: Marcy Long PA-C  Authorized by: Marcy Long PA-C    Consent Done?:  Yes (Verbal)    Local anesthetic:  None  Location details:  Both ears  Procedure type: curette    Cerumen  Removal Results:  Cerumen completely removed  Patient tolerance:  Patient tolerated the procedure well with no immediate complications

## (undated) DEVICE — DRAPE STERI INSTRUMENT 1018

## (undated) DEVICE — APPLICATOR CHLORAPREP ORN 26ML

## (undated) DEVICE — TUBING SUC UNIV W/CONN 12FT

## (undated) DEVICE — SOL NACL IRR 3000ML

## (undated) DEVICE — ELECTRODE REM POLYHESIVE II

## (undated) DEVICE — SUT VICRYL+ 1 CT1 18IN

## (undated) DEVICE — DRAPE PLASTIC U 60X72

## (undated) DEVICE — GOWN ECLIPSE REINF LV4 XLNG XL

## (undated) DEVICE — CONTAINER SPECIMEN OR STER 4OZ

## (undated) DEVICE — BLADE SAGITTA 5/BX

## (undated) DEVICE — GLOVE PROTEXIS LIGHT BROWN 8.5

## (undated) DEVICE — BLADE SHAVER LANZA 4.2X13CM

## (undated) DEVICE — STAPLER SKIN PROXIMATE WIDE

## (undated) DEVICE — DRAPE MINOR FEN 98X77X121IN

## (undated) DEVICE — DRAPE STERI-DRAPE 1000 17X11IN

## (undated) DEVICE — ADHESIVE DERMABOND ADVANCED

## (undated) DEVICE — Device

## (undated) DEVICE — PROBE ARTHROSCOPIC ENERGY 90

## (undated) DEVICE — TRAY MINOR ORTHO

## (undated) DEVICE — NDL SPINAL 18GX3.5 SPINOCAN

## (undated) DEVICE — GLOVE SENSICARE PI ALOE 7.5

## (undated) DEVICE — WRAP KNEE ACCU THERM GEL PACK

## (undated) DEVICE — DRAPE C-ARM ELAS CLIP 42X120IN

## (undated) DEVICE — DRESSING XEROFORM FOIL PK 1X8

## (undated) DEVICE — GAUZE SPONGE 4X4 12PLY

## (undated) DEVICE — SEE MEDLINE ITEM 146298

## (undated) DEVICE — SUT CHROMIC 3-0 SH 27IN GUT

## (undated) DEVICE — BRACE KNEE T SCOPE PREMIER

## (undated) DEVICE — UNDERGLOVES BIOGEL PI SIZE 8

## (undated) DEVICE — GLOVE SENSICARE PI SURG 7

## (undated) DEVICE — UNDERGLOVES BIOGEL PI SIZE 7.5

## (undated) DEVICE — SEE MEDLINE ITEM 157131

## (undated) DEVICE — SLEEVE LITE DEVON

## (undated) DEVICE — DRAPE C-ARMOR EQUIPMENT COVER

## (undated) DEVICE — BNDG COFLEX FOAM LF2 ST 6X5YD

## (undated) DEVICE — SPONGE SURGIFOAM 100 8.5X12X10

## (undated) DEVICE — SUT MCRYL PLUS 4-0 PS2 27IN

## (undated) DEVICE — DRESSING AQUACEL AG FOAM 4X4

## (undated) DEVICE — PAD ABDOMINAL STERILE 8X10IN

## (undated) DEVICE — DRAPE INCISE IOBAN 2 23X17IN

## (undated) DEVICE — GOWN SMART IMP BREATHABLE XXLG

## (undated) DEVICE — PADDING CAST 6 INCH

## (undated) DEVICE — GLOVE BIOGEL PI MICRO SZ 7

## (undated) DEVICE — SUT ETHILON 3-0 PS2 18 BLK

## (undated) DEVICE — GLOVE BIOGEL SKINSENSE PI 7.5

## (undated) DEVICE — GLOVE BIOGEL SKINSENSE PI 7.0

## (undated) DEVICE — SEE MEDLINE ITEM 146345

## (undated) DEVICE — DRESSING AQUACEL FOAM 5 X 5

## (undated) DEVICE — DRAPE STERI U-SHAPED 47X51IN

## (undated) DEVICE — BANDAGE ACE ELASTIC 6"

## (undated) DEVICE — PADDING CAST 6IN DELTA ROLL

## (undated) DEVICE — PAD CAST SPECIALIST STRL 6

## (undated) DEVICE — TUBE SET INFLOW/OUTFLOW

## (undated) DEVICE — SYR 30CC LUER LOCK

## (undated) DEVICE — SOL IRR NACL .9% 3000ML

## (undated) DEVICE — ELECTRODE REM PLYHSV RETURN 9

## (undated) DEVICE — DRAPE C ARM 42 X 120 10/BX

## (undated) DEVICE — SEE MEDLINE ITEM 157216

## (undated) DEVICE — BLADE MICRO SAGITTAL

## (undated) DEVICE — MAT SURGICAL ECOSUCTIONER

## (undated) DEVICE — DRESSING AQUACEL AG SILVER 4X4

## (undated) DEVICE — COVER CAMERA OPERATING ROOM

## (undated) DEVICE — BLADE PRECIS FALC OSC TIP SAW

## (undated) DEVICE — LOOP VESSEL BLUE MAXI

## (undated) DEVICE — DRESSING N ADH OIL EMUL 3X8

## (undated) DEVICE — DRAPE TOP 53X102IN

## (undated) DEVICE — SUT VICRYL PLUS 2-0 CT1 18

## (undated) DEVICE — DRESSING SPONGE 16PLY 4X4 NS

## (undated) DEVICE — STOCKINET TUBULAR 1 PLY 6X60IN

## (undated) DEVICE — DRAPE HIP TIBURON 87X115X134

## (undated) DEVICE — DRAPE T EXTRM SURG 121X128X90

## (undated) DEVICE — PACK ARTHROSCOPY

## (undated) DEVICE — BLADE ULTRACUT 3.5MM

## (undated) DEVICE — KIT IRR SUCTION HND PIECE

## (undated) DEVICE — PAD ABD 8X10 STERILE

## (undated) DEVICE — PAD ELECTRODE STER 1.5X3

## (undated) DEVICE — SUT VICRYL PLUS 3-0 SH 18IN

## (undated) DEVICE — MARKER SKIN RULER STERILE

## (undated) DEVICE — PAD COLD THERAPY KNEE WRAP ON

## (undated) DEVICE — SUT VICRYL 3-0 27 SH

## (undated) DEVICE — TOURNIQUET SB QC DP 34X4IN

## (undated) DEVICE — BLADE SURG CARBON STEEL SZ11

## (undated) DEVICE — DRILL BIT

## (undated) DEVICE — UNDERGLOVES BIOGEL PI SIZE 8.5

## (undated) DEVICE — NDL 18GA X1 1/2 REG BEVEL

## (undated) DEVICE — COVER LIGHT HANDLE 80/CA

## (undated) DEVICE — UNDERGLOVES BIOGEL PI SZ 7 LF